# Patient Record
Sex: FEMALE | Race: WHITE | Employment: FULL TIME | URBAN - METROPOLITAN AREA
[De-identification: names, ages, dates, MRNs, and addresses within clinical notes are randomized per-mention and may not be internally consistent; named-entity substitution may affect disease eponyms.]

---

## 2018-01-14 NOTE — RESULT NOTES
Verified Results  * MAMMO SCREENING BILATERAL W CAD [id number] 03:33PM Ru Johnson     Test Name Result Flag Reference   MAMMO SCREENING BILATERAL W CAD (Report)     Patient History:   No known family history of cancer  Patient has never smoked  Patient's BMI is 25 8  Reason for exam: screening (asymptomatic)  Mammo Screening Bilateral W CAD: February 8, 2016 - Check In #:    [de-identified]   Bilateral MLO, CC, and XCCL view(s) were taken  Technologist: AMANDO Huitron (R)(M)   Prior study comparison: January 14, 2013, bilateral OPMA digital    scrn mammo w/CAD performed at [address]  March 31, 2009, bilateral QMA digital scrn mammo   w/CAD, performed at [address]  April 10,    2007, bilateral digital screening [place] [address] [facility] [place]  There are scattered fibroglandular densities  No dominant soft tissue mass, architectural distortion or    suspicious calcifications are noted  The skin and nipple    contours are within normal limits  No evidence of malignancy  No significant changes   when compared with prior studies  ASSESSMENT: BiRad:1 - Negative     Recommendation:   Routine screening mammogram of both breasts in 1 year  A reminder letter will be scheduled  Analyzed by CAD     8-10% of cancers will be missed on mammography  Management of a    palpable abnormality must be based on clinical grounds  Patients   will be notified of their results via letter from our facility  Accredited by [employer] and FDA       Transcription Location: AMANDO Dominguez 98: [id number]     Risk Value(s):   Tyrer-Cuzick 10 Year: 4 258%, Tyrer-Cuzick Lifetime: 11 559%,    Myriad Table: 1 5%, GALEN 5 Year: 1 8%, NCI Lifetime: 10 5%   Signed by:   Antoinette Phillips MD   2/9/16

## 2018-10-25 ENCOUNTER — ANNUAL EXAM (OUTPATIENT)
Dept: FAMILY MEDICINE CLINIC | Facility: CLINIC | Age: 58
End: 2018-10-25
Payer: COMMERCIAL

## 2018-10-25 VITALS
BODY MASS INDEX: 26.37 KG/M2 | RESPIRATION RATE: 16 BRPM | TEMPERATURE: 97.3 F | WEIGHT: 174 LBS | HEART RATE: 106 BPM | SYSTOLIC BLOOD PRESSURE: 108 MMHG | DIASTOLIC BLOOD PRESSURE: 78 MMHG | HEIGHT: 68 IN | OXYGEN SATURATION: 97 %

## 2018-10-25 DIAGNOSIS — Z01.419 ENCOUNTER FOR GYNECOLOGICAL EXAMINATION WITHOUT ABNORMAL FINDING: Primary | ICD-10-CM

## 2018-10-25 DIAGNOSIS — Z13.29 SCREENING FOR THYROID DISORDER: ICD-10-CM

## 2018-10-25 DIAGNOSIS — Z12.4 SCREENING FOR CERVICAL CANCER: ICD-10-CM

## 2018-10-25 DIAGNOSIS — Z12.39 SCREENING FOR BREAST CANCER: ICD-10-CM

## 2018-10-25 DIAGNOSIS — Z13.220 SCREENING, LIPID: ICD-10-CM

## 2018-10-25 DIAGNOSIS — Z23 NEED FOR VACCINATION: ICD-10-CM

## 2018-10-25 DIAGNOSIS — Z13.0 SCREENING, ANEMIA, DEFICIENCY, IRON: ICD-10-CM

## 2018-10-25 DIAGNOSIS — Z13.1 SCREENING FOR DIABETES MELLITUS: ICD-10-CM

## 2018-10-25 PROBLEM — M85.9 LOW BONE DENSITY FOR AGE: Status: ACTIVE | Noted: 2018-10-25

## 2018-10-25 PROCEDURE — 99396 PREV VISIT EST AGE 40-64: CPT | Performed by: FAMILY MEDICINE

## 2018-10-25 PROCEDURE — 99213 OFFICE O/P EST LOW 20 MIN: CPT | Performed by: FAMILY MEDICINE

## 2018-10-25 RX ORDER — LANOLIN ALCOHOL/MO/W.PET/CERES
1 CREAM (GRAM) TOPICAL DAILY
COMMUNITY
End: 2020-04-24 | Stop reason: ALTCHOICE

## 2018-10-25 RX ORDER — IBUPROFEN 200 MG
1 TABLET ORAL AS NEEDED
COMMUNITY
Start: 2015-04-29 | End: 2019-07-03 | Stop reason: ALTCHOICE

## 2018-10-25 NOTE — PROGRESS NOTES
Shona Dodd is a 59 y o   female and is here for routine health maintenance  The patient reports no problems  History of Present Illness     HPI    Well Adult Physical   Patient here for a comprehensive physical exam       Diet and Physical Activity  Diet: well balanced diet  Weight concerns: Patient is overweight (BMI 25 0-29  9)  Exercise: intermittently      Depression Screen  PHQ-9 Depression Screening    PHQ-9:    Frequency of the following problems over the past two weeks:       Little interest or pleasure in doing things:  0 - not at all  Feeling down, depressed, or hopeless:  0 - not at all  PHQ-2 Score:  0          General Health  Hearing: Normal:  bilateral  Vision: goes for regular eye exams, most recent eye exam <1 year and wears glasses  Dental: regular dental visits and brushes teeth twice daily     History:  LMP: No LMP recorded  Patient has had a hysterectomy  Menopause at 34 years  : 2  Para: 2    Cancer Screening  Colononoscopy - UTD, 2018  Mammogram - orderes given today, 2016    Pap today  Abnormal pap? no  Smoker no       The following portions of the patient's history were reviewed and updated as appropriate: allergies, current medications, past family history, past medical history, past social history, past surgical history and problem list     Review of Systems     Review of Systems   Constitutional: Negative for chills, fever and unexpected weight change  HENT: Negative for hearing loss  Eyes: Negative for visual disturbance  Respiratory: Negative for cough and shortness of breath  Cardiovascular: Negative for chest pain  Gastrointestinal: Negative for abdominal pain, constipation and diarrhea  Genitourinary: Negative for difficulty urinating  Musculoskeletal: Negative for arthralgias and gait problem  Skin: Negative for rash  Neurological: Negative for light-headedness and headaches  Psychiatric/Behavioral: Negative for dysphoric mood  Past Medical History     Past Medical History:   Diagnosis Date    No known problems        Past Surgical History     Past Surgical History:   Procedure Laterality Date    HYSTERECTOMY      Resolved 7/2003       Social History     Social History     Social History    Marital status: /Civil Union     Spouse name: N/A    Number of children: N/A    Years of education: N/A     Social History Main Topics    Smoking status: Never Smoker    Smokeless tobacco: Not on file    Alcohol use Yes      Comment: Social    Drug use: No    Sexual activity: Not on file     Other Topics Concern    Not on file     Social History Narrative    No narrative on file       Family History     Family History   Problem Relation Age of Onset    Diabetes Mother         Mellitus    Diabetes Maternal Grandmother         Mellitus       Current Medications       Current Outpatient Prescriptions:     calcium citrate-vitamin D (CITRACAL+D) 315-200 MG-UNIT per tablet, Take 1 tablet by mouth daily, Disp: , Rfl:     ibuprofen (MOTRIN) 200 mg tablet, Take 1 tablet by mouth as needed, Disp: , Rfl:      Allergies     Allergies   Allergen Reactions    Penicillins GI Intolerance     Category: Adverse Reaction;     Pollen Extract        Objective     /78   Pulse (!) 106   Temp (!) 97 3 °F (36 3 °C)   Resp 16   Ht 5' 8 2" (1 732 m)   Wt 78 9 kg (174 lb)   SpO2 97%   BMI 26 30 kg/m²   Wt Readings from Last 3 Encounters:   10/25/18 78 9 kg (174 lb)   08/17/16 78 9 kg (174 lb)   09/30/15 78 7 kg (173 lb 8 oz)     BP Readings from Last 3 Encounters:   10/25/18 108/78   08/17/16 128/84   09/30/15 110/72     Pulse Readings from Last 3 Encounters:   10/25/18 (!) 106   08/17/16 (!) 106   09/30/15 80     Body mass index is 26 3 kg/m²  Physical Exam   Constitutional: She is oriented to person, place, and time  She appears well-developed and well-nourished  HENT:   Head: Normocephalic and atraumatic     Mouth/Throat: Oropharynx is clear and moist    Eyes: Conjunctivae and EOM are normal    Neck: Normal range of motion  Neck supple  No thyroid mass present  Cardiovascular: Normal rate, regular rhythm and normal heart sounds  Pulmonary/Chest: Effort normal and breath sounds normal  No respiratory distress  She has no wheezes  She has no rales  Abdominal: Normal appearance  Musculoskeletal: Normal range of motion  She exhibits no edema  Neurological: She is alert and oriented to person, place, and time  Skin: Skin is warm, dry and intact  Psychiatric: She has a normal mood and affect  Her behavior is normal  Judgment and thought content normal    Nursing note and vitals reviewed  No exam data present    Health Maintenance     Health Maintenance   Topic Date Due    Depression Screening PHQ  1960    PAP SMEAR  01/21/1979    MAMMOGRAM  02/08/2017    INFLUENZA VACCINE  07/01/2018    DTaP,Tdap,and Td Vaccines (2 - Td) 09/30/2025    CRC Screening: Colonoscopy  08/28/2028     Immunization History   Administered Date(s) Administered    Tdap 09/30/2015         Laboratory Results:   No results found for: WBC, HGB, HCT, MCV, PLT  Lab Results   Component Value Date    BUN 17 12/30/2013     Lab Results   Component Value Date    ALT 32 12/30/2013    AST 15 12/30/2013     No results found for: TSH  No components found for: A1C    Lipid Profile:   Lab Results   Component Value Date    CHOL 159 12/30/2013     Lab Results   Component Value Date    HDL 38 12/30/2013     No results found for: [facility]  Lab Results   Component Value Date    LDLCALC 101 12/30/2013     Lab Results   Component Value Date    TRIG 98 12/30/2013       Assessment/Plan         1   Healthy female exam   2  Patient Counseling:   · Nutrition: Stressed importance of a well balanced diet, moderation of sodium/saturated fat, caloric balance and sufficient intake of fiber  · Exercise: Stressed the importance of regular exercise with a goal of 150 minutes per week  · Dental Health: Discussed daily flossing and brushing and regular dental visits     · Immunizations reviewed  She declines a flu vaccine  · Discussed benefits of screening   · Discussed the patient's BMI with her  The BMI is above average; BMI management plan is completed  3  Cancer Screening - mammo ordered, pap today, colo utd  4  Labs ordered  5  Follow up next physical in 1 year      Orlando Vinson MD

## 2018-10-25 NOTE — PATIENT INSTRUCTIONS
Pap Smear   AMBULATORY CARE:   A Pap smear,  or Pap test, is used to screen for cervical cancer  It is also used to find precancerous and cancerous cells of the vulva and vagina  Prepare for a Pap smear: The best time to schedule the test is right after your period stops  Do not have a Pap smear during your monthly period  During a Pap smear:   · You will lie on your back and place your feet on footrests called stirrups  Your healthcare provider will gently insert a device called a speculum into your vagina  The speculum is used to open the walls of your vagina so he can see your cervix  · Your healthcare provider will gently scrape your cervix and vaginal areas for cell samples  The samples are placed in a container with liquid or on a glass slide  They are sent to a lab and examined for abnormal cells  A test for Human Papillomavirus (HPV) may be done at the same time  HPV is a sexually transmitted virus that can cause changes in cervical cells  After your Pap smear:  Your healthcare provider will tell you when you can expect your Pap smear results  You may have some spotting the day of your procedure  When to get a Pap smear:  Pap smears are usually done every 3 to 5 years depending on your age  You may need a Pap smear more often if you have any of the following:  · Positive test result for the human papillomavirus (HPV)    · A history of cervical cancer    · HIV    · A weak immune system    · Exposure to diethylstilbestrol (KOMAL) medicine when your mother was pregnant with you  © 2017 [address] Information is for End User's use only and may not be sold, redistributed or otherwise used for commercial purposes  All illustrations and images included in CareNotes® are the copyrighted property of [employer]  or Ollie Barrera  The above information is an  only  It is not intended as medical advice for individual conditions or treatments   Talk to your doctor, nurse or pharmacist before following any medical regimen to see if it is safe and effective for you  Wellness Visit for Adults   AMBULATORY CARE:   A wellness visit  is when you see your healthcare provider to get screened for health problems  You can also get advice on how to stay healthy  Write down your questions so you remember to ask them  Ask your healthcare provider how often you should have a wellness visit  What happens at a wellness visit:  Your healthcare provider will ask about your health, and your family history of health problems  This includes high blood pressure, heart disease, and cancer  He or she will ask if you have symptoms that concern you, if you smoke, and about your mood  You may also be asked about your intake of medicines, supplements, food, and alcohol  Any of the following may be done:  · Your weight  will be checked  Your height may also be checked so your body mass index (BMI) can be calculated  Your BMI shows if you are at a healthy weight  · Your blood pressure  and heart rate will be checked  Your temperature may also be checked  · Blood and urine tests  may be done  Blood tests may be done to check your cholesterol levels  Abnormal cholesterol levels increase your risk for heart disease and stroke  You may also need a blood or urine test to check for diabetes if you are at increased risk  Urine tests may be done to look for signs of an infection or kidney disease  · A physical exam  includes checking your heartbeat and lungs with a stethoscope  Your healthcare provider may also check your skin to look for sun damage  · Screening tests  may be recommended  A screening test is done to check for diseases that may not cause symptoms  The screening tests you may need depend on your age, gender, family history, and lifestyle habits  For example, colorectal screening may be recommended if you are 48years old or older    Screening tests you need if you are a woman: · A Pap smear  is used to screen for cervical cancer  Pap smears are usually done every 3 to 5 years depending on your age  You may need them more often if you have had abnormal Pap smear test results in the past  Ask your healthcare provider how often you should have a Pap smear  · A mammogram  is an x-ray of your breasts to screen for breast cancer  Experts recommend mammograms every 2 years starting at age 48 years  You may need a mammogram at age 52 years or younger if you have an increased risk for breast cancer  Talk to your healthcare provider about when you should start having mammograms and how often you need them  Vaccines you may need:   · Get an influenza vaccine  every year  The influenza vaccine protects you from the flu  Several types of viruses cause the flu  The viruses change over time, so new vaccines are made each year  · Get a tetanus-diphtheria (Td) booster vaccine  every 10 years  This vaccine protects you against tetanus and diphtheria  Tetanus is a severe infection that may cause painful muscle spasms and lockjaw  Diphtheria is a severe bacterial infection that causes a thick covering in the back of your mouth and throat  · Get a human papillomavirus (HPV) vaccine  if you are female and aged 23 to 32 or male 23 to 24 and never received it  This vaccine protects you from HPV infection  HPV is the most common infection spread by sexual contact  HPV may also cause vaginal, penile, and anal cancers  · Get a pneumococcal vaccine  if you are aged 72 years or older  The pneumococcal vaccine is an injection given to protect you from pneumococcal disease  Pneumococcal disease is an infection caused by pneumococcal bacteria  The infection may cause pneumonia, meningitis, or an ear infection  · Get a shingles vaccine  if you are aged 61 or older, even if you have had shingles before  The shingles vaccine is an injection to protect you from the varicella-zoster virus   This is the same virus that causes chickenpox  Shingles is a painful rash that develops in people who had chickenpox or have been exposed to the virus  How to eat healthy:  My Plate is a model for planning healthy meals  It shows the types and amounts of foods that should go on your plate  Fruits and vegetables make up about half of your plate, and grains and protein make up the other half  A serving of dairy is included on the side of your plate  The amount of calories and serving sizes you need depends on your age, gender, weight, and height  Examples of healthy foods are listed below:  · Eat a variety of vegetables  such as dark green, red, and orange vegetables  You can also include canned vegetables low in sodium (salt) and frozen vegetables without added butter or sauces  · Eat a variety of fresh fruits , canned fruit in 100% juice, frozen fruit, and dried fruit  · Include whole grains  At least half of the grains you eat should be whole grains  Examples include whole-wheat bread, wheat pasta, brown rice, and whole-grain cereals such as oatmeal     · Eat a variety of protein foods such as seafood (fish and shellfish), lean meat, and poultry without skin (turkey and chicken)  Examples of lean meats include pork leg, shoulder, or tenderloin, and beef round, sirloin, tenderloin, and extra lean ground beef  Other protein foods include eggs and egg substitutes, beans, peas, soy products, nuts, and seeds  · Choose low-fat dairy products such as skim or 1% milk or low-fat yogurt, cheese, and cottage cheese  · Limit unhealthy fats  such as butter, hard margarine, and shortening  Exercise:  Exercise at least 30 minutes per day on most days of the week  Some examples of exercise include walking, biking, dancing, and swimming  You can also fit in more physical activity by taking the stairs instead of the elevator or parking farther away from stores  Include muscle strengthening activities 2 days each week  Regular exercise provides many health benefits  It helps you manage your weight, and decreases your risk for type 2 diabetes, heart disease, stroke, and high blood pressure  Exercise can also help improve your mood  Ask your healthcare provider about the best exercise plan for you  General health and safety guidelines:   · Do not smoke  Nicotine and other chemicals in cigarettes and cigars can cause lung damage  Ask your healthcare provider for information if you currently smoke and need help to quit  E-cigarettes or smokeless tobacco still contain nicotine  Talk to your healthcare provider before you use these products  · Limit alcohol  A drink of alcohol is 12 ounces of beer, 5 ounces of wine, or 1½ ounces of liquor  · Lose weight, if needed  Being overweight increases your risk of certain health conditions  These include heart disease, high blood pressure, type 2 diabetes, and certain types of cancer  · Protect your skin  Do not sunbathe or use tanning beds  Use sunscreen with a SPF 15 or higher  Apply sunscreen at least 15 minutes before you go outside  Reapply sunscreen every 2 hours  Wear protective clothing, hats, and sunglasses when you are outside  · Drive safely  Always wear your seatbelt  Make sure everyone in your car wears a seatbelt  A seatbelt can save your life if you are in an accident  Do not use your cell phone when you are driving  This could distract you and cause an accident  Pull over if you need to make a call or send a text message  · Practice safe sex  Use latex condoms if are sexually active and have more than one partner  Your healthcare provider may recommend screening tests for sexually transmitted infections (STIs)  · Wear helmets, lifejackets, and protective gear  Always wear a helmet when you ride a bike or motorcycle, go skiing, or play sports that could cause a head injury  Wear protective equipment when you play sports   Wear a lifejacket when you are on a boat or doing water sports  © 2017 [facility] Information is for End User's use only and may not be sold, redistributed or otherwise used for commercial purposes  All illustrations and images included in CareNotes® are the copyrighted property of A [employer] M [employer]  or Ollie Barrera  The above information is an  only  It is not intended as medical advice for individual conditions or treatments  Talk to your doctor, nurse or pharmacist before following any medical regimen to see if it is safe and effective for you

## 2018-10-25 NOTE — PROGRESS NOTES
Subjective    Saulo Coffey is a 59 y o  female here for a routine gynecologic exam       Current complaints:   none     Gynecologic History  Post-menopausal - partial hysterectomy in her 45s  Last Pap: does not know, very long time ago   Results were: normal  Last mammogram: 16  Results were: normal  Last colonoscopy: 18  Results were: normal    Obstetric History  OB History         - 2 SVDs  No complications    The following portions of the patient's history were reviewed and updated as appropriate: allergies, current medications, past family history, past medical history, past social history, past surgical history and problem list     Review of Systems   Constitutional: Negative for unexpected weight change  Genitourinary: Negative for difficulty urinating, dyspareunia, dysuria, frequency, menstrual problem, pelvic pain, vaginal bleeding, vaginal discharge and vaginal pain  Skin: Negative for rash  Objective    Vitals:    10/25/18 1545   BP: 108/78   Pulse: (!) 106   Resp: 16   Temp: (!) 97 3 °F (36 3 °C)   SpO2: 97%   Weight: 78 9 kg (174 lb)   Height: 5' 8 2" (1 732 m)       Physical Exam   Constitutional: She appears well-developed and well-nourished  Pulmonary/Chest: Effort normal  Right breast exhibits no mass, no nipple discharge, no skin change and no tenderness  Left breast exhibits no mass, no nipple discharge, no skin change and no tenderness  Breasts are symmetrical  There is no breast swelling  Genitourinary: Vagina normal and uterus normal  No breast discharge or bleeding  Pelvic exam was performed with patient supine  There is no rash, tenderness or lesion on the right labia  There is no rash, tenderness or lesion on the left labia  Cervix exhibits no motion tenderness, no discharge and no friability  Right adnexum displays no mass, no tenderness and no fullness  Left adnexum displays no mass, no tenderness and no fullness         Assessment     Healthy female exam  Plan     Education reviewed: low fat, low cholesterol diet and self breast exams  Mammogram ordered    Labs ordered

## 2018-11-01 LAB
CLINICAL INFO: NORMAL
DATE PREVIOUS BX: NORMAL
LMP START DATE: NORMAL
QUESTION/PROBLEM: NORMAL
SL AMB CONTAINER TYPE: NORMAL
SL AMB FINAL RESOLUTION: NORMAL
SL AMB PREV. PAP:: NORMAL
SL AMB REPORT STATUS: NORMAL
SPECIMEN SOURCE CVX/VAG CYTO: NORMAL
STAT OF ADQ CVX/VAG CYTO-IMP: NORMAL

## 2019-07-03 ENCOUNTER — OFFICE VISIT (OUTPATIENT)
Dept: FAMILY MEDICINE CLINIC | Facility: CLINIC | Age: 59
End: 2019-07-03
Payer: COMMERCIAL

## 2019-07-03 VITALS
HEART RATE: 96 BPM | SYSTOLIC BLOOD PRESSURE: 108 MMHG | BODY MASS INDEX: 26.26 KG/M2 | WEIGHT: 173.25 LBS | TEMPERATURE: 98.4 F | DIASTOLIC BLOOD PRESSURE: 70 MMHG | HEIGHT: 68 IN | OXYGEN SATURATION: 98 %

## 2019-07-03 DIAGNOSIS — Z12.39 SCREENING FOR BREAST CANCER: ICD-10-CM

## 2019-07-03 DIAGNOSIS — H00.014 HORDEOLUM EXTERNUM OF LEFT UPPER EYELID: Primary | ICD-10-CM

## 2019-07-03 PROCEDURE — 99213 OFFICE O/P EST LOW 20 MIN: CPT | Performed by: FAMILY MEDICINE

## 2019-07-03 PROCEDURE — 1036F TOBACCO NON-USER: CPT | Performed by: FAMILY MEDICINE

## 2019-07-03 PROCEDURE — 3008F BODY MASS INDEX DOCD: CPT | Performed by: FAMILY MEDICINE

## 2019-07-03 RX ORDER — ERYTHROMYCIN 5 MG/G
0.5 OINTMENT OPHTHALMIC 2 TIMES DAILY
Qty: 3.5 G | Refills: 0 | Status: SHIPPED | OUTPATIENT
Start: 2019-07-03 | End: 2020-04-24 | Stop reason: ALTCHOICE

## 2019-07-03 NOTE — PROGRESS NOTES
Subjective:   Chief Complaint   Patient presents with    Eye Problem     pt c/o swollen left eye lid that she noticed last thursday        Patient ID: Antonieta Blood is a 62 y o  female  The patient is here because she has a swelling on the left upper eyelid  It has been there about six days  It is generally uncomfortable though not terribly painful  Nothing has drained from the area  She has no trouble with her vision  She has no redness of the eye itself  She has never had anything like this before, she thinks it might be a stye      The following portions of the patient's history were reviewed and updated as appropriate: allergies, current medications, past family history, past medical history, past social history, past surgical history and problem list     Review of Systems      Objective:  Vitals:    07/03/19 1436   BP: 108/70   Pulse: 96   Temp: 98 4 °F (36 9 °C)   SpO2: 98%   Weight: 78 6 kg (173 lb 4 oz)   Height: 5' 8 2" (1 732 m)      Physical Exam   Constitutional: She is oriented to person, place, and time  She appears well-developed and well-nourished  Eyes: Left eye exhibits hordeolum  Neurological: She is alert and oriented to person, place, and time  Skin: Skin is warm and dry  Diabetic Foot Exam      Assessment/Plan:    No problem-specific Assessment & Plan notes found for this encounter  Diagnoses and all orders for this visit:    Hordeolum externum of left upper eyelid  -     erythromycin (ILOTYCIN) ophthalmic ointment; Administer 0 5 inches into the left eye 2 (two) times a day    BMI 26 0-26 9,adult    Screening for breast cancer  -     Mammo screening bilateral w 3d & cad; Future        BMI Counseling: Body mass index is 26 19 kg/m²  Discussed the patient's BMI with her  The BMI is above average  BMI counseling and education was provided to the patient  Exercise recommendations include exercising 3-5 times per week

## 2019-07-03 NOTE — PATIENT INSTRUCTIONS
Stye   WHAT YOU NEED TO KNOW:   A stye is a lump on the edge or inside of your eyelid caused by an infection  A stye can form on your upper or lower eyelid  It usually goes away in 2 to 4 days  DISCHARGE INSTRUCTIONS:   Medicines:   · Antibiotic medicine: This is given as an ointment to put into your eye  It is used to fight an infection caused by bacteria  Use as directed  · Take your medicine as directed  Contact your healthcare provider if you think your medicine is not helping or if you have side effects  Tell him of her if you are allergic to any medicine  Keep a list of the medicines, vitamins, and herbs you take  Include the amounts, and when and why you take them  Bring the list or the pill bottles to follow-up visits  Carry your medicine list with you in case of an emergency  Follow up with your healthcare provider as directed:  Write down your questions so you remember to ask them during your visits  Self-care:   · Use warm compresses: This will help decrease swelling and pain  Wet a clean washcloth with warm water and place it on your eye for 10 to 15 minutes, 3 to 4 times each day or as directed  · Keep your hands away from your eye: This helps to prevent the spread of the infection to other parts of the eye  Wash your hands often with soap and dry with a clean towel  Do not squeeze the stye  · Do not use eye makeup:  Do not wear eye makeup while you have a stye  Eye makeup may carry bacteria and cause another stye  Throw away eye makeup and brushes used to apply the makeup  Use new eye makeup after the stye has gone away  Do not share eye makeup with others  · Prevent another stye:  Wash your face and clean your eyelashes every day  Remove eye makeup with makeup remover  This helps to completely remove eye makeup without heavy rubbing  Contact your healthcare provider if:   · You have redness and discharge around your eye, and your eye pain is getting worse      · Your vision changes  · The stye has not gone away within 7 days  · The stye comes back within a short period of time after treatment  · You have questions or concerns about your condition or care  © 2017 [address] Information is for End User's use only and may not be sold, redistributed or otherwise used for commercial purposes  All illustrations and images included in CareNotes® are the copyrighted property of [employer]  or Ollie Barrera  The above information is an  only  It is not intended as medical advice for individual conditions or treatments  Talk to your doctor, nurse or pharmacist before following any medical regimen to see if it is safe and effective for you

## 2019-07-11 ENCOUNTER — TELEPHONE (OUTPATIENT)
Dept: FAMILY MEDICINE CLINIC | Facility: CLINIC | Age: 59
End: 2019-07-11

## 2019-07-11 NOTE — TELEPHONE ENCOUNTER
She can keep using the medicine until it goes away    If it is not gone within two weeks I think she should see an ophthalmologist

## 2019-07-11 NOTE — TELEPHONE ENCOUNTER
Pt was in to see you last week for a sty in the eye  It is getting better  But not fully better  How long should she keep putting on the medication      [id number]

## 2019-09-16 ENCOUNTER — HOSPITAL ENCOUNTER (OUTPATIENT)
Dept: BONE DENSITY | Facility: IMAGING CENTER | Age: 59
Discharge: HOME | End: 2019-09-16
Payer: COMMERCIAL

## 2019-09-16 VITALS — BODY MASS INDEX: 25.92 KG/M2 | WEIGHT: 175 LBS | HEIGHT: 69 IN

## 2019-09-16 DIAGNOSIS — Z12.39 SCREENING FOR BREAST CANCER: ICD-10-CM

## 2019-09-16 PROCEDURE — 77067 SCR MAMMO BI INCL CAD: CPT

## 2019-09-16 PROCEDURE — 77063 BREAST TOMOSYNTHESIS BI: CPT

## 2019-11-16 ENCOUNTER — IMMUNIZATIONS (OUTPATIENT)
Dept: FAMILY MEDICINE CLINIC | Facility: CLINIC | Age: 59
End: 2019-11-16
Payer: COMMERCIAL

## 2019-11-16 DIAGNOSIS — Z23 ENCOUNTER FOR IMMUNIZATION: ICD-10-CM

## 2019-11-16 PROCEDURE — 90471 IMMUNIZATION ADMIN: CPT

## 2019-11-16 PROCEDURE — 90682 RIV4 VACC RECOMBINANT DNA IM: CPT

## 2020-04-24 ENCOUNTER — OFFICE VISIT (OUTPATIENT)
Dept: FAMILY MEDICINE CLINIC | Facility: CLINIC | Age: 60
End: 2020-04-24
Payer: COMMERCIAL

## 2020-04-24 VITALS
HEART RATE: 108 BPM | TEMPERATURE: 97.4 F | WEIGHT: 175 LBS | OXYGEN SATURATION: 98 % | BODY MASS INDEX: 25.92 KG/M2 | HEIGHT: 69 IN
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
## 2020-04-24 PROCEDURE — 99213 OFFICE O/P EST LOW 20 MIN: CPT | Performed by: FAMILY MEDICINE

## 2020-04-24 PROCEDURE — 3008F BODY MASS INDEX DOCD: CPT | Performed by: FAMILY MEDICINE

## 2020-04-24 PROCEDURE — 1036F TOBACCO NON-USER: CPT | Performed by: FAMILY MEDICINE

## 2021-03-26 DIAGNOSIS — Z23 ENCOUNTER FOR IMMUNIZATION: ICD-10-CM

## 2021-07-27 ENCOUNTER — OFFICE VISIT (OUTPATIENT)
Dept: FAMILY MEDICINE CLINIC | Facility: CLINIC | Age: 61
End: 2021-07-27
Payer: COMMERCIAL

## 2021-07-27 VITALS
DIASTOLIC BLOOD PRESSURE: 72 MMHG | OXYGEN SATURATION: 97 % | SYSTOLIC BLOOD PRESSURE: 110 MMHG | HEIGHT: 68 IN | WEIGHT: 174 LBS | HEART RATE: 91 BPM | BODY MASS INDEX: 26.37 KG/M2

## 2021-07-27 DIAGNOSIS — Z13.1 SCREENING FOR DIABETES MELLITUS: ICD-10-CM

## 2021-07-27 DIAGNOSIS — Z12.31 ENCOUNTER FOR SCREENING MAMMOGRAM FOR MALIGNANT NEOPLASM OF BREAST: ICD-10-CM

## 2021-07-27 DIAGNOSIS — Z13.228 SCREENING FOR ENDOCRINE, NUTRITIONAL, METABOLIC AND IMMUNITY DISORDER: ICD-10-CM

## 2021-07-27 DIAGNOSIS — Z13.220 SCREENING, LIPID: ICD-10-CM

## 2021-07-27 DIAGNOSIS — Z13.0 SCREENING FOR ENDOCRINE, NUTRITIONAL, METABOLIC AND IMMUNITY DISORDER: ICD-10-CM

## 2021-07-27 DIAGNOSIS — L02.91 ABSCESS: Primary | ICD-10-CM

## 2021-07-27 DIAGNOSIS — Z11.59 NEED FOR HEPATITIS C SCREENING TEST: ICD-10-CM

## 2021-07-27 DIAGNOSIS — Z13.21 SCREENING FOR ENDOCRINE, NUTRITIONAL, METABOLIC AND IMMUNITY DISORDER: ICD-10-CM

## 2021-07-27 DIAGNOSIS — Z13.29 SCREENING FOR ENDOCRINE, NUTRITIONAL, METABOLIC AND IMMUNITY DISORDER: ICD-10-CM

## 2021-07-27 DIAGNOSIS — Z13.0 SCREENING, ANEMIA, DEFICIENCY, IRON: ICD-10-CM

## 2021-07-27 PROCEDURE — 99213 OFFICE O/P EST LOW 20 MIN: CPT | Performed by: FAMILY MEDICINE

## 2021-07-27 PROCEDURE — 1036F TOBACCO NON-USER: CPT | Performed by: FAMILY MEDICINE

## 2021-07-27 PROCEDURE — 3008F BODY MASS INDEX DOCD: CPT | Performed by: FAMILY MEDICINE

## 2021-07-27 PROCEDURE — 3725F SCREEN DEPRESSION PERFORMED: CPT | Performed by: FAMILY MEDICINE

## 2021-07-27 RX ORDER — SULFAMETHOXAZOLE AND TRIMETHOPRIM 800; 160 MG/1; MG/1
1 TABLET ORAL EVERY 12 HOURS SCHEDULED
Qty: 14 TABLET | Refills: 0 | Status: SHIPPED | OUTPATIENT
Start: 2021-07-27 | End: 2021-08-03

## 2021-07-27 NOTE — PROGRESS NOTES
Subjective:   Chief Complaint   Patient presents with    Abscess     PT COMES IN WITH ABSCESS UNDER LEFT BREAST   PT DOES USE WARM COMPRESS AND IS ABLE TO EXPELL WHITE DISCHARGE  fbw and mammo orders given to patient  Patient ID: Bridgett Falcon is a 59 y o  female  Patient is here today because she has an abscess on the left breast   She was in [place] on vacation, did a lot of walking, was very hot, there is no air conditioning  Her bra was rubbing against this area  It started as a small pimple  Now it has gotten large, tender, and is draining white pus   No fevers or chills      The following portions of the patient's history were reviewed and updated as appropriate: allergies, current medications, past family history, past medical history, past social history, past surgical history and problem list     Review of Systems          Objective:  Vitals:    07/27/21 1457   BP: 110/72   Pulse: 91   SpO2: 97%   Weight: 78 9 kg (174 lb)   Height: 5' 8" (1 727 m)      Physical Exam  Constitutional:       General: She is not in acute distress  Appearance: Normal appearance  She is not ill-appearing  Skin:     General: Skin is warm and dry  Comments: Larger erythematous fluctuant pustule on the lower medial aspect of the left breast, surrounding erythema, tender, draining pus   Neurological:      General: No focal deficit present  Mental Status: She is alert and oriented to person, place, and time  Cranial Nerves: No cranial nerve deficit  Gait: Gait normal    Psychiatric:         Mood and Affect: Mood normal          Behavior: Behavior normal          Thought Content: Thought content normal          Judgment: Judgment normal            Assessment/Plan:    No problem-specific Assessment & Plan notes found for this encounter  I advised the patient stand and hot shower, use warm compresses, trying get this to drain even more  I am adding an antibiotic    If this is not going away within a couple of days we may need to open it  Diagnoses and all orders for this visit:    Abscess  -     sulfamethoxazole-trimethoprim (BACTRIM DS) 800-160 mg per tablet; Take 1 tablet by mouth every 12 (twelve) hours for 7 days    Encounter for screening mammogram for malignant neoplasm of breast  -     Mammo screening bilateral w 3d & cad; Future    Need for hepatitis C screening test  -     Hepatitis C RNA, quantitative, PCR; Future  -     Hepatitis C RNA, quantitative, PCR    Screening for diabetes mellitus  -     Comprehensive metabolic panel; Future  -     Comprehensive metabolic panel    Screening for endocrine, nutritional, metabolic and immunity disorder  -     CBC and differential; Future  -     Comprehensive metabolic panel; Future  -     TSH, 3rd generation with Free T4 reflex; Future  -     CBC and differential  -     Comprehensive metabolic panel  -     TSH, 3rd generation with Free T4 reflex    Screening, anemia, deficiency, iron  -     CBC and differential; Future  -     Comprehensive metabolic panel; Future  -     CBC and differential  -     Comprehensive metabolic panel    Screening, lipid  -     Lipid panel;  Future  -     Lipid panel

## 2021-07-29 ENCOUNTER — TELEPHONE (OUTPATIENT)
Dept: FAMILY MEDICINE CLINIC | Facility: CLINIC | Age: 61
End: 2021-07-29

## 2021-07-29 DIAGNOSIS — L02.91 ABSCESS: Primary | ICD-10-CM

## 2021-07-29 RX ORDER — CEFUROXIME AXETIL 500 MG/1
500 TABLET ORAL EVERY 12 HOURS SCHEDULED
Qty: 20 TABLET | Refills: 0 | Status: SHIPPED | OUTPATIENT
Start: 2021-07-29 | End: 2021-08-08

## 2021-07-29 NOTE — TELEPHONE ENCOUNTER
Pharmacy called stating pt said she is allergic to sulfa and is requesting a different abx to pharmacy

## 2021-08-19 ENCOUNTER — LAB (OUTPATIENT)
Dept: LAB | Facility: HOSPITAL | Age: 61
End: 2021-08-19
Attending: FAMILY MEDICINE
Payer: COMMERCIAL

## 2021-08-19 DIAGNOSIS — Z00.00 ROUTINE GENERAL MEDICAL EXAMINATION AT A HEALTH CARE FACILITY: ICD-10-CM

## 2021-08-19 LAB
ALBUMIN SERPL BCP-MCNC: 3.8 G/DL (ref 3.5–5)
ALP SERPL-CCNC: 104 U/L (ref 46–116)
ALT SERPL W P-5'-P-CCNC: 27 U/L (ref 12–78)
ANION GAP SERPL CALCULATED.3IONS-SCNC: 2 MMOL/L (ref 4–13)
AST SERPL W P-5'-P-CCNC: 17 U/L (ref 5–45)
BASOPHILS # BLD AUTO: 0.03 THOUSANDS/ΜL (ref 0–0.1)
BASOPHILS NFR BLD AUTO: 1 % (ref 0–1)
BILIRUB SERPL-MCNC: 1.13 MG/DL (ref 0.2–1)
BUN SERPL-MCNC: 16 MG/DL (ref 5–25)
CALCIUM SERPL-MCNC: 9 MG/DL (ref 8.3–10.1)
CHLORIDE SERPL-SCNC: 107 MMOL/L (ref 100–108)
CHOLEST SERPL-MCNC: 183 MG/DL (ref 50–200)
CO2 SERPL-SCNC: 30 MMOL/L (ref 21–32)
CREAT SERPL-MCNC: 0.65 MG/DL (ref 0.6–1.3)
EOSINOPHIL # BLD AUTO: 0.07 THOUSAND/ΜL (ref 0–0.61)
EOSINOPHIL NFR BLD AUTO: 2 % (ref 0–6)
ERYTHROCYTE [DISTWIDTH] IN BLOOD BY AUTOMATED COUNT: 12 % (ref 11.6–15.1)
GFR SERPL CREATININE-BSD FRML MDRD: 95 ML/MIN/1.73SQ M
GLUCOSE P FAST SERPL-MCNC: 85 MG/DL (ref 65–99)
HCT VFR BLD AUTO: 45.4 % (ref 34.8–46.1)
HDLC SERPL-MCNC: 45 MG/DL
HGB BLD-MCNC: 15.1 G/DL (ref 11.5–15.4)
IMM GRANULOCYTES # BLD AUTO: 0.01 THOUSAND/UL (ref 0–0.2)
IMM GRANULOCYTES NFR BLD AUTO: 0 % (ref 0–2)
LDLC SERPL CALC-MCNC: 122 MG/DL (ref 0–100)
LYMPHOCYTES # BLD AUTO: 0.94 THOUSANDS/ΜL (ref 0.6–4.47)
LYMPHOCYTES NFR BLD AUTO: 24 % (ref 14–44)
MCH RBC QN AUTO: 30.1 PG (ref 26.8–34.3)
MCHC RBC AUTO-ENTMCNC: 33.3 G/DL (ref 31.4–37.4)
MCV RBC AUTO: 90 FL (ref 82–98)
MONOCYTES # BLD AUTO: 0.26 THOUSAND/ΜL (ref 0.17–1.22)
MONOCYTES NFR BLD AUTO: 7 % (ref 4–12)
NEUTROPHILS # BLD AUTO: 2.56 THOUSANDS/ΜL (ref 1.85–7.62)
NEUTS SEG NFR BLD AUTO: 66 % (ref 43–75)
NONHDLC SERPL-MCNC: 138 MG/DL
NRBC BLD AUTO-RTO: 0 /100 WBCS
PLATELET # BLD AUTO: 215 THOUSANDS/UL (ref 149–390)
PMV BLD AUTO: 11.3 FL (ref 8.9–12.7)
POTASSIUM SERPL-SCNC: 3.9 MMOL/L (ref 3.5–5.3)
PROT SERPL-MCNC: 7.2 G/DL (ref 6.4–8.2)
RBC # BLD AUTO: 5.02 MILLION/UL (ref 3.81–5.12)
SODIUM SERPL-SCNC: 139 MMOL/L (ref 136–145)
TRIGL SERPL-MCNC: 80 MG/DL
TSH SERPL DL<=0.05 MIU/L-ACNC: 1.34 UIU/ML (ref 0.36–3.74)
WBC # BLD AUTO: 3.87 THOUSAND/UL (ref 4.31–10.16)

## 2021-08-19 PROCEDURE — 80061 LIPID PANEL: CPT

## 2021-08-19 PROCEDURE — 87522 HEPATITIS C REVRS TRNSCRPJ: CPT

## 2021-08-19 PROCEDURE — 85025 COMPLETE CBC W/AUTO DIFF WBC: CPT

## 2021-08-19 PROCEDURE — 36415 COLL VENOUS BLD VENIPUNCTURE: CPT

## 2021-08-19 PROCEDURE — 84443 ASSAY THYROID STIM HORMONE: CPT

## 2021-08-19 PROCEDURE — 80053 COMPREHEN METABOLIC PANEL: CPT

## 2021-08-21 LAB
HCV RNA SERPL NAA+PROBE-ACNC: NORMAL IU/ML
TEST INFORMATION: NORMAL

## 2021-09-27 ENCOUNTER — HOSPITAL ENCOUNTER (OUTPATIENT)
Dept: MAMMOGRAPHY | Facility: IMAGING CENTER | Age: 61
Discharge: HOME | End: 2021-09-27
Payer: COMMERCIAL

## 2021-09-27 VITALS — BODY MASS INDEX: 26.37 KG/M2 | WEIGHT: 174 LBS | HEIGHT: 68 IN

## 2021-09-27 DIAGNOSIS — Z12.31 ENCOUNTER FOR SCREENING MAMMOGRAM FOR MALIGNANT NEOPLASM OF BREAST: ICD-10-CM

## 2021-09-27 PROCEDURE — 77063 BREAST TOMOSYNTHESIS BI: CPT

## 2021-09-27 PROCEDURE — 77067 SCR MAMMO BI INCL CAD: CPT

## 2021-09-30 ENCOUNTER — TELEPHONE (OUTPATIENT)
Dept: FAMILY MEDICINE CLINIC | Facility: CLINIC | Age: 61
End: 2021-09-30

## 2021-10-06 ENCOUNTER — OFFICE VISIT (OUTPATIENT)
Dept: FAMILY MEDICINE CLINIC | Facility: CLINIC | Age: 61
End: 2021-10-06
Payer: COMMERCIAL

## 2021-10-06 VITALS
HEIGHT: 68 IN | BODY MASS INDEX: 26.37 KG/M2 | DIASTOLIC BLOOD PRESSURE: 81 MMHG | HEART RATE: 97 BPM | OXYGEN SATURATION: 97 % | WEIGHT: 174 LBS | TEMPERATURE: 97.4 F | SYSTOLIC BLOOD PRESSURE: 137 MMHG

## 2021-10-06 DIAGNOSIS — Z00.00 ANNUAL PHYSICAL EXAM: Primary | ICD-10-CM

## 2021-10-06 DIAGNOSIS — Z23 NEED FOR VACCINATION: ICD-10-CM

## 2021-10-06 PROCEDURE — 90686 IIV4 VACC NO PRSV 0.5 ML IM: CPT

## 2021-10-06 PROCEDURE — 3725F SCREEN DEPRESSION PERFORMED: CPT | Performed by: NURSE PRACTITIONER

## 2021-10-06 PROCEDURE — 90471 IMMUNIZATION ADMIN: CPT

## 2021-10-06 PROCEDURE — 3008F BODY MASS INDEX DOCD: CPT | Performed by: NURSE PRACTITIONER

## 2021-10-06 PROCEDURE — 99396 PREV VISIT EST AGE 40-64: CPT | Performed by: NURSE PRACTITIONER

## 2021-10-06 PROCEDURE — 1036F TOBACCO NON-USER: CPT | Performed by: NURSE PRACTITIONER

## 2021-10-18 ENCOUNTER — HOSPITAL ENCOUNTER (OUTPATIENT)
Dept: ULTRASOUND IMAGING | Facility: CLINIC | Age: 61
Discharge: HOME | End: 2021-10-18
Payer: COMMERCIAL

## 2021-10-18 DIAGNOSIS — R92.8 ABNORMAL SCREENING MAMMOGRAM: ICD-10-CM

## 2021-10-18 PROCEDURE — 76642 ULTRASOUND BREAST LIMITED: CPT

## 2021-11-01 ENCOUNTER — TELEPHONE (OUTPATIENT)
Dept: FAMILY MEDICINE CLINIC | Facility: CLINIC | Age: 61
End: 2021-11-01

## 2021-11-04 ENCOUNTER — OFFICE VISIT (OUTPATIENT)
Dept: FAMILY MEDICINE CLINIC | Facility: CLINIC | Age: 61
End: 2021-11-04
Payer: COMMERCIAL

## 2021-11-04 VITALS
BODY MASS INDEX: 26.14 KG/M2 | DIASTOLIC BLOOD PRESSURE: 80 MMHG | TEMPERATURE: 97 F | HEIGHT: 68 IN | WEIGHT: 172.5 LBS | HEART RATE: 90 BPM | OXYGEN SATURATION: 99 % | SYSTOLIC BLOOD PRESSURE: 110 MMHG

## 2021-11-04 DIAGNOSIS — F41.9 ANXIETY: ICD-10-CM

## 2021-11-04 DIAGNOSIS — I99.8 FLUCTUATING BLOOD PRESSURE: ICD-10-CM

## 2021-11-04 DIAGNOSIS — D72.819 LEUKOPENIA, UNSPECIFIED TYPE: Primary | ICD-10-CM

## 2021-11-04 PROCEDURE — 1036F TOBACCO NON-USER: CPT | Performed by: PHYSICIAN ASSISTANT

## 2021-11-04 PROCEDURE — 3008F BODY MASS INDEX DOCD: CPT | Performed by: PHYSICIAN ASSISTANT

## 2021-11-04 PROCEDURE — 99214 OFFICE O/P EST MOD 30 MIN: CPT | Performed by: PHYSICIAN ASSISTANT

## 2021-11-18 LAB
BASOPHILS # BLD AUTO: 28 CELLS/UL (ref 0–200)
BASOPHILS NFR BLD AUTO: 0.7 %
EOSINOPHIL # BLD AUTO: 68 CELLS/UL (ref 15–500)
EOSINOPHIL NFR BLD AUTO: 1.7 %
ERYTHROCYTE [DISTWIDTH] IN BLOOD BY AUTOMATED COUNT: 12.2 % (ref 11–15)
HCT VFR BLD AUTO: 43.6 % (ref 35–45)
HGB BLD-MCNC: 14.7 G/DL (ref 11.7–15.5)
LYMPHOCYTES # BLD AUTO: 992 CELLS/UL (ref 850–3900)
LYMPHOCYTES NFR BLD AUTO: 24.8 %
MCH RBC QN AUTO: 29.9 PG (ref 27–33)
MCHC RBC AUTO-ENTMCNC: 33.7 G/DL (ref 32–36)
MCV RBC AUTO: 88.6 FL (ref 80–100)
MONOCYTES # BLD AUTO: 316 CELLS/UL (ref 200–950)
MONOCYTES NFR BLD AUTO: 7.9 %
NEUTROPHILS # BLD AUTO: 2596 CELLS/UL (ref 1500–7800)
NEUTROPHILS NFR BLD AUTO: 64.9 %
PLATELET # BLD AUTO: 228 THOUSAND/UL (ref 140–400)
PMV BLD REES-ECKER: 10.7 FL (ref 7.5–12.5)
RBC # BLD AUTO: 4.92 MILLION/UL (ref 3.8–5.1)
WBC # BLD AUTO: 4 THOUSAND/UL (ref 3.8–10.8)

## 2021-12-14 ENCOUNTER — OFFICE VISIT (OUTPATIENT)
Dept: FAMILY MEDICINE CLINIC | Facility: CLINIC | Age: 61
End: 2021-12-14
Payer: COMMERCIAL

## 2021-12-14 VITALS
OXYGEN SATURATION: 97 % | TEMPERATURE: 97.1 F | BODY MASS INDEX: 26.14 KG/M2 | WEIGHT: 172.5 LBS | HEART RATE: 100 BPM | HEIGHT: 68 IN | DIASTOLIC BLOOD PRESSURE: 66 MMHG | SYSTOLIC BLOOD PRESSURE: 126 MMHG

## 2021-12-14 DIAGNOSIS — Z01.30 BP CHECK: Primary | ICD-10-CM

## 2021-12-14 PROCEDURE — 1036F TOBACCO NON-USER: CPT | Performed by: NURSE PRACTITIONER

## 2021-12-14 PROCEDURE — 3008F BODY MASS INDEX DOCD: CPT | Performed by: NURSE PRACTITIONER

## 2021-12-14 PROCEDURE — 99213 OFFICE O/P EST LOW 20 MIN: CPT | Performed by: NURSE PRACTITIONER

## 2022-02-23 ENCOUNTER — OFFICE VISIT (OUTPATIENT)
Dept: FAMILY MEDICINE CLINIC | Facility: CLINIC | Age: 62
End: 2022-02-23
Payer: COMMERCIAL

## 2022-02-23 VITALS
SYSTOLIC BLOOD PRESSURE: 120 MMHG | HEIGHT: 68 IN | BODY MASS INDEX: 26.67 KG/M2 | HEART RATE: 114 BPM | TEMPERATURE: 97.2 F | WEIGHT: 176 LBS | DIASTOLIC BLOOD PRESSURE: 68 MMHG | OXYGEN SATURATION: 97 %

## 2022-02-23 DIAGNOSIS — M54.41 CHRONIC RIGHT-SIDED LOW BACK PAIN WITH RIGHT-SIDED SCIATICA: ICD-10-CM

## 2022-02-23 DIAGNOSIS — J30.9 ALLERGIC RHINITIS, UNSPECIFIED SEASONALITY, UNSPECIFIED TRIGGER: Primary | ICD-10-CM

## 2022-02-23 DIAGNOSIS — G89.29 CHRONIC RIGHT-SIDED LOW BACK PAIN WITH RIGHT-SIDED SCIATICA: ICD-10-CM

## 2022-02-23 PROCEDURE — 99214 OFFICE O/P EST MOD 30 MIN: CPT | Performed by: PHYSICIAN ASSISTANT

## 2022-02-23 PROCEDURE — 1036F TOBACCO NON-USER: CPT | Performed by: PHYSICIAN ASSISTANT

## 2022-02-23 PROCEDURE — 3008F BODY MASS INDEX DOCD: CPT | Performed by: PHYSICIAN ASSISTANT

## 2022-02-23 RX ORDER — FLUTICASONE PROPIONATE 50 MCG
2 SPRAY, SUSPENSION (ML) NASAL DAILY
Qty: 9.9 ML | Refills: 3 | Status: SHIPPED | OUTPATIENT
Start: 2022-02-23

## 2022-02-23 NOTE — PROGRESS NOTES
Assessment/Plan:           Problem List Items Addressed This Visit     None      Visit Diagnoses     Allergic rhinitis, unspecified seasonality, unspecified trigger    -  Primary    Relevant Medications    fluticasone (FLONASE) 50 mcg/act nasal spray    Chronic right-sided low back pain with right-sided sciatica        Relevant Orders    Ambulatory Referral to Physical Therapy            Subjective:      Patient ID: Nereida Vinson is a 57 y o  female  C/o ear discomfort past few months  Does note muffled sounds in the morning, and has some congestion  Having hot flashes, past 14 years, in the morning, and sometimes in the evening, and afternoon  Having some right sided lower back pain  Review of Systems   Constitutional: Positive for diaphoresis  Negative for chills, fatigue and fever  HENT: Positive for congestion and ear pain  Negative for ear discharge, hearing loss, rhinorrhea, sore throat and tinnitus  Respiratory: Negative for cough, chest tightness and shortness of breath  Cardiovascular: Negative for chest pain, palpitations and leg swelling  Objective:      /68   Pulse (!) 114   Temp (!) 97 2 °F (36 2 °C) (Tympanic)   Ht 5' 8" (1 727 m)   Wt 79 8 kg (176 lb)   SpO2 97%   BMI 26 76 kg/m²          Physical Exam  Vitals reviewed  Constitutional:       General: She is not in acute distress  Appearance: Normal appearance  She is not ill-appearing, toxic-appearing or diaphoretic  HENT:      Head: Normocephalic and atraumatic  Right Ear: Tympanic membrane, ear canal and external ear normal  There is no impacted cerumen  Left Ear: Tympanic membrane, ear canal and external ear normal  There is no impacted cerumen  Ears:      Comments: Congestion noted in middle ears  Nose: Congestion present  Comments: Turbinates inflamed  Mouth/Throat:      Mouth: Mucous membranes are dry        Pharynx: No oropharyngeal exudate or posterior oropharyngeal erythema  Eyes:      General: No scleral icterus  Right eye: No discharge  Left eye: No discharge  Extraocular Movements: Extraocular movements intact  Conjunctiva/sclera: Conjunctivae normal    Pulmonary:      Effort: Pulmonary effort is normal  No respiratory distress  Breath sounds: Normal breath sounds  No stridor  No wheezing or rhonchi  Musculoskeletal:         General: No swelling, tenderness, deformity or signs of injury  Normal range of motion  Cervical back: Neck supple  Right lower leg: No edema  Left lower leg: No edema  Neurological:      General: No focal deficit present  Mental Status: She is alert and oriented to person, place, and time

## 2022-03-07 ENCOUNTER — EVALUATION (OUTPATIENT)
Dept: PHYSICAL THERAPY | Facility: CLINIC | Age: 62
End: 2022-03-07
Payer: COMMERCIAL

## 2022-03-07 DIAGNOSIS — G89.29 CHRONIC RIGHT-SIDED LOW BACK PAIN WITH RIGHT-SIDED SCIATICA: Primary | ICD-10-CM

## 2022-03-07 DIAGNOSIS — M54.41 CHRONIC RIGHT-SIDED LOW BACK PAIN WITH RIGHT-SIDED SCIATICA: Primary | ICD-10-CM

## 2022-03-07 PROCEDURE — 97110 THERAPEUTIC EXERCISES: CPT | Performed by: PHYSICAL THERAPIST

## 2022-03-07 PROCEDURE — 97140 MANUAL THERAPY 1/> REGIONS: CPT | Performed by: PHYSICAL THERAPIST

## 2022-03-07 PROCEDURE — 97162 PT EVAL MOD COMPLEX 30 MIN: CPT | Performed by: PHYSICAL THERAPIST

## 2022-03-07 NOTE — PROGRESS NOTES
PT Evaluation     Today's date: 3/7/2022  Patient name: Anthony Bella  : 1960  MRN: [id number]  Referring provider: Harlan Jones PA-C  Dx:   Encounter Diagnosis     ICD-10-CM    1  Chronic right-sided low back pain with right-sided sciatica  M54 41     G89 29             Assessment  Assessment details: Anthony Bella is a 57 y o  female who presents with pain, decreased strength, decreased ROM, decreased joint mobility, joint effusion, ambulatory dysfunction and postural  dysfunction  Due to these impairments, patient has difficulty performing ADL's, recreational activities, work-related activities, engaging in social activities, ambulation, stair negotiation, lifting/carrying, transfers  Patient's clinical presentation is consistent with their referring diagnosis of Chronic right-sided low back pain with right-sided sciatica  (primary encounter diagnosis)  Patient has been educated in home exercise program and plan of care  Patient would benefit from skilled physical therapy services to address their aforementioned functional limitations and progress towards prior level of function and independence with home exercise program      Impairments: abnormal gait, abnormal or restricted ROM, activity intolerance, impaired balance, impaired physical strength, lacks appropriate home exercise program, pain with function, poor posture  and poor body mechanics  Functional limitations: prolonged sit, stand/walk, bending, lifting/carrying    Prognosis: good  Prognosis details: + factors: high motivation levels,   - factors: none    Goals  Short term goals to be accomplished in 4 weeks:  STG 1: Pt will demo independence with postural management and HEP    STG 2: Pt will demo L/S AROM < or = min loss throughout to promote improved functional mobility and body mechanics  STG 3: Pt will reports pain dec freq and intensity 50%    Long term goals to be accomplished in 12 weeks:  LTG 1: Pt will demo good body mech with >75% functional challenges to prevent reinjury  LTG 2: Pt will be able to return to sit for 2 hours pain free as per PLOF  LTG3: Pt will have no pain for 1 week with any functional activities  Plan  Plan details: HEP development, stretching, strengthening, A/AA/PROM, joint mobilizations, posture education, STM/MI as needed to reduce muscle tension, muscle reeducation, PLOC discussed and agreed upon with patient  Patient would benefit from: PT eval and skilled physical therapy  Planned modality interventions: cryotherapy, thermotherapy: hydrocollator packs and unattended electrical stimulation  Planned therapy interventions: manual therapy, neuromuscular re-education, self care, therapeutic activities, therapeutic exercise, home exercise program, balance, joint mobilization, postural training, patient education, strengthening, stretching and flexibility  Frequency: 2x week  Duration in weeks: 12  Plan of Care beginning date: 3/7/2022  Plan of Care expiration date: 2022  Treatment plan discussed with: patient        Subjective Evaluation    History of Present Illness  Mechanism of injury: Pt is a 58 y/o female who presents to PT with R sided lumbar pain with sciatica that began about 1 month ago  Went to her PCP on 22 where she was prescribed PT to  Address her lumbar pain  Pain at worst with prolonged sitting     Pain  Current pain ratin  At best pain ratin  At worst pain ratin  Location: R lumbar/sciatica  Quality: throbbing, radiating, pulling, discomfort and tight  Aggravating factors: stair climbing, walking, standing, sitting and lifting    Treatments  Current treatment: physical therapy  Patient Goals  Patient goals for therapy: increased motion, decreased pain, increased strength, independence with ADLs/IADLs and return to sport/leisure activities  Patient goal: prolonged sit, stand/walk, bending, lifting/carrying         Objective     Concurrent Complaints  Negative for night pain, disturbed sleep, bladder dysfunction, bowel dysfunction and saddle (S4) numbness    Postural Observations  Seated posture: fair  Standing posture: fair  Correction of posture: has no consistent effect        Tenderness     Additional Tenderness Details  TTP over lumbar and thoracic paraspinals, and R piriformis     Active Range of Motion   Cervical/Thoracic Spine       Thoracic    Extension:  Restriction level: moderate    Lumbar   Extension:  Restriction level: moderate  Left lateral flexion:  Restriction level: moderate  Right lateral flexion:  Restriction level: moderate  Left rotation:  Restriction level: moderate  Right rotation:  Restriction level: moderate    Joint Play     Hypomobile: T8, T9, T10, T11, T12, L1, L2, L3, L4, L5 and S1     Strength/Myotome Testing     Left Hip   Planes of Motion   Flexion: 4+  Abduction: 3-    Right Hip   Planes of Motion   Flexion: 4+  Abduction: 3-    Left Knee   Flexion: 4  Extension: 4    Right Knee   Flexion: 4  Extension: 4    Left Ankle/Foot   Dorsiflexion: 4    Right Ankle/Foot   Dorsiflexion: 4    Muscle Activation     Additional Muscle Activation Details        Tests     Lumbar     Left   Negative quadrant  Right   Negative quadrant  Right Hip   Positive KISHA          Precautions:   Lipoma of torso   Low bone density for age         Manuals 3/7            STM lumbar/thoracic             PA mobs lumbar thoracic                                       Neuro Re-Ed 3/7            Pressure cuff abd brace                                       Row             Sh ext                                       Ther Ex 3/7                         S/l thoracic rotation 10x                         ANTONY 10x            Seated thoracic extension 10x            Supine piriformis stretch :20x2 ea            S/l clamshell 10x ea            S/l hip abd 10x ea                         Ther Activity 3/7 Modalities 3/7                         CT

## 2022-03-09 ENCOUNTER — OFFICE VISIT (OUTPATIENT)
Dept: PHYSICAL THERAPY | Facility: CLINIC | Age: 62
End: 2022-03-09
Payer: COMMERCIAL

## 2022-03-09 DIAGNOSIS — M54.41 CHRONIC RIGHT-SIDED LOW BACK PAIN WITH RIGHT-SIDED SCIATICA: Primary | ICD-10-CM

## 2022-03-09 DIAGNOSIS — G89.29 CHRONIC RIGHT-SIDED LOW BACK PAIN WITH RIGHT-SIDED SCIATICA: Primary | ICD-10-CM

## 2022-03-09 PROCEDURE — 97112 NEUROMUSCULAR REEDUCATION: CPT | Performed by: PHYSICAL THERAPIST

## 2022-03-09 PROCEDURE — 97110 THERAPEUTIC EXERCISES: CPT | Performed by: PHYSICAL THERAPIST

## 2022-03-09 PROCEDURE — 97140 MANUAL THERAPY 1/> REGIONS: CPT | Performed by: PHYSICAL THERAPIST

## 2022-03-09 NOTE — PROGRESS NOTES
Daily Note     Today's date: 3/9/2022  Patient name: Jonas Crystal  : 1960  MRN: [id number]  Referring provider: Georgina Monreal PA-C  Dx:   Encounter Diagnosis     ICD-10-CM    1  Chronic right-sided low back pain with right-sided sciatica  M54 41     G89 29           Subjective: Pt noted that she has been consistently performing her HEP without an increase in pain  Objective: See treatment diary below    Assessment: Tolerated treatment well  Patient demonstrated fatigue post treatment, exhibited good technique with therapeutic exercises and would benefit from continued PT   Plan: Continue per plan of care        Precautions:   Lipoma of torso   Low bone density for age         Manuals 3/7 3/9           STM lumbar/thoracic             PA mobs lumbar thoracic             STM R piriformis   JZ           Piriformis, ITB, HS stretch  JZ                        Neuro Re-Ed 3/7 3/9           Pressure cuff abd brace  :30x3           Pressure cuff march >120  10x ea           Pressure cuff lvl 2 march >120  2x ea                        Row             Sh ext                                       Ther Ex 3/7 3/9                        S/l thoracic rotation 10x                         ANTONY 10x            Seated thoracic extension 10x            Supine piriformis stretch :20x2 ea            S/l clamshell 10x ea O 3x10            S/l hip abd 10x ea            Bridge w/ TB abd  O 3x10           TB Side step  O 10' 3x ea                                     Ther Activity 3/7 3/9                                                                            Modalities 3/7 3/9                        CT

## 2022-03-14 ENCOUNTER — OFFICE VISIT (OUTPATIENT)
Dept: PHYSICAL THERAPY | Facility: CLINIC | Age: 62
End: 2022-03-14
Payer: COMMERCIAL

## 2022-03-14 DIAGNOSIS — M54.41 CHRONIC RIGHT-SIDED LOW BACK PAIN WITH RIGHT-SIDED SCIATICA: Primary | ICD-10-CM

## 2022-03-14 DIAGNOSIS — G89.29 CHRONIC RIGHT-SIDED LOW BACK PAIN WITH RIGHT-SIDED SCIATICA: Primary | ICD-10-CM

## 2022-03-14 PROCEDURE — 97140 MANUAL THERAPY 1/> REGIONS: CPT

## 2022-03-14 PROCEDURE — 97112 NEUROMUSCULAR REEDUCATION: CPT

## 2022-03-14 PROCEDURE — 97110 THERAPEUTIC EXERCISES: CPT

## 2022-03-14 NOTE — PROGRESS NOTES
Daily Note     Today's date: 3/14/2022  Patient name: Daniela Salcedo  : 1960  MRN: [id number]  Referring provider: Ángel Lea PA-C  Dx:   Encounter Diagnosis     ICD-10-CM    1  Chronic right-sided low back pain with right-sided sciatica  M54 41     G89 29           Subjective: Aysha Estes reports she was sore following last PT session, sx's present for 1-2 days following  Pt states majority of soreness was present along back and inside of thighs  Pt states she did not complete much exercises d/t soreness  Objective: See treatment diary below    Assessment: Reba tolerated PT treatment well  Initiated session with RB warm up  RLE stretching performed, limited adductor flexibility observed  Pt was give self stretch with HEP to address  Pt was more challenged with TA engagement this session  Lower pressure required on BPC to maintain isometric contraction  Fatigue evident with glute strengthening  Pt would benefit from continued PT to further address impairments and maximize functional level  Plan: Continue per plan of care        Precautions:   Lipoma of torso   Low bone density for age         Manuals 3/7 3/9 3/14          STM lumbar/thoracic             PA mobs lumbar thoracic             STM R piriformis   JZ           Piriformis, ITB, HS stretch  JZ                        Neuro Re-Ed 3/7 3/9 3/14          Pressure cuff abd brace  :30x3 :30x3           Pressure cuff march >120  10x ea >90 10x ea           Pressure cuff lvl 2 march >120  2x ea                        Row             Sh ext                                       Ther Ex 3/7 3/9 3/14                       S/l thoracic rotation 10x                         ANTONY 10x            Seated thoracic extension 10x            Supine piriformis stretch :20x2 ea            S/l clamshell 10x ea O 3x10  O 3x10          S/l hip abd 10x ea            Bridge w/ TB abd  O 3x10 O 3x10          TB Side step  O 10' 3x ea           Hamstring stretch    :30x3 Adductor stretch    :30x3          Ther Activity 3/7 3/9 3/14          RB   5'                                                              Modalities 3/7 3/9 3/14                       CT

## 2022-03-16 ENCOUNTER — OFFICE VISIT (OUTPATIENT)
Dept: PHYSICAL THERAPY | Facility: CLINIC | Age: 62
End: 2022-03-16
Payer: COMMERCIAL

## 2022-03-16 DIAGNOSIS — M54.41 CHRONIC RIGHT-SIDED LOW BACK PAIN WITH RIGHT-SIDED SCIATICA: Primary | ICD-10-CM

## 2022-03-16 DIAGNOSIS — G89.29 CHRONIC RIGHT-SIDED LOW BACK PAIN WITH RIGHT-SIDED SCIATICA: Primary | ICD-10-CM

## 2022-03-16 PROCEDURE — 97110 THERAPEUTIC EXERCISES: CPT

## 2022-03-16 PROCEDURE — 97140 MANUAL THERAPY 1/> REGIONS: CPT

## 2022-03-16 PROCEDURE — 97112 NEUROMUSCULAR REEDUCATION: CPT

## 2022-03-16 NOTE — PROGRESS NOTES
Daily Note     Today's date: 3/16/2022  Patient name: Nereida Vinson  : 1960  MRN: [id number]  Referring provider: Issa Brasher PA-C  Dx:   Encounter Diagnosis     ICD-10-CM    1  Chronic right-sided low back pain with right-sided sciatica  M54 41     G89 29           Subjective: Lucas Cheatham reports she felt good after last PT session, minimal to no low back pain occurring following day  She states today she had some radicular sx's in R thigh, but did not complete HEP  Objective: See treatment diary below    Assessment: Reba tolerated PT treatment well  Continued with current exercise program  RB performed as warm up  Greater flexibility displayed with adductor and hamstring stretch today  Pt remains very challenged with core stabilization  She is able to maintain isometric hold around  on BP cuff, unable to advance at this time  Cues required to avoid valsalva with exercises  Increased TB resistance with glute strengthening  Pt would benefit from continued PT to further address impairments and maximize functional level  Plan: Continue per plan of care        Precautions:   Lipoma of torso   Low bone density for age         Manuals 3/7 3/9 3/14 3/16         STM lumbar/thoracic             PA mobs lumbar thoracic             STM R piriformis   JZ JW JW         Piriformis, ITB, HS stretch  JJERROD RAMOS                      Neuro Re-Ed 3/7 3/9 3/14 3/16         Pressure cuff abd brace  :30x3 :30x3  :30x3          Pressure cuff march >120  10x ea >90 10x ea  >100 2x10 ea         Pressure cuff lvl 2 march >120  2x ea                        Row             Sh ext                                       Ther Ex 3/7 3/9 3/14 3/16                      S/l thoracic rotation 10x                         ANTONY 10x            Seated thoracic extension 10x            Supine piriformis stretch :20x2 ea            S/l clamshell 10x ea O 3x10  O 3x10 O 3x10         S/l hip abd 10x ea            Bridge w/ TB abd  O 3x10 O 3x10 O 3x10         TB Side step  O 10' 3x ea  O 3x10         Hamstring stretch    :30x3 :30x3         Adductor stretch    :30x3 :30x3         Ther Activity 3/7 3/9 3/14 3/16         RB   5' 5'                                                             Modalities 3/7 3/9 3/14 3/16                      CT

## 2022-03-21 ENCOUNTER — OFFICE VISIT (OUTPATIENT)
Dept: PHYSICAL THERAPY | Facility: CLINIC | Age: 62
End: 2022-03-21
Payer: COMMERCIAL

## 2022-03-21 DIAGNOSIS — M54.41 CHRONIC RIGHT-SIDED LOW BACK PAIN WITH RIGHT-SIDED SCIATICA: Primary | ICD-10-CM

## 2022-03-21 DIAGNOSIS — G89.29 CHRONIC RIGHT-SIDED LOW BACK PAIN WITH RIGHT-SIDED SCIATICA: Primary | ICD-10-CM

## 2022-03-21 PROCEDURE — 97110 THERAPEUTIC EXERCISES: CPT

## 2022-03-21 PROCEDURE — 97112 NEUROMUSCULAR REEDUCATION: CPT

## 2022-03-21 PROCEDURE — 97140 MANUAL THERAPY 1/> REGIONS: CPT

## 2022-03-21 NOTE — PROGRESS NOTES
Daily Note     Today's date: 3/21/2022  Patient name: Niall Geren  : 1960  MRN: [id number]  Referring provider: Aliya Wilkerson PA-C  Dx:   Encounter Diagnosis     ICD-10-CM    1  Chronic right-sided low back pain with right-sided sciatica  M54 41     G89 29           Subjective: Elizabeth Diego reports she is still experiencing sharp spasms into RLE, primarily into buttock region with nerve pain along inside of thigh  She feels PT is helping, but has not seen significant changes yet  Objective: See treatment diary below    Assessment: Elizabeth Diego is progressing nicely with current POC  Advanced glute strengthening with addition of TB hip 3 way  She remains very challenged with TA activation with BPC feed back  Able to add level 2 march today, pt fatigued after five repetitions  TPR performed to piriformis, TTP present  Pt would benefit from continued PT to further address impairments and maximize functional level  Plan: Continue per plan of care        Precautions:   Lipoma of torso   Low bone density for age         Manuals 3/7 3/9 3/14 3/16 3/21        STM lumbar/thoracic             PA mobs lumbar thoracic             STM R piriformis   JZ JW JW JW        Piriformis, ITB, HS stretch  Vicki Cortez                      Neuro Re-Ed 3/7 3/9 3/14 3/16 3/21        Pressure cuff abd brace  :30x3 :30x3  :30x3  :30x3  >100        Pressure cuff march >120  10x ea >90 10x ea  >100 2x10 ea >100 2x10 ea        Pressure cuff lvl 2 march >120  2x ea   5x ea                      Row             Sh ext                                       Ther Ex 3/7 3/9 3/14 3/16 3/21                     S/l thoracic rotation 10x                         ANTONY 10x            Seated thoracic extension 10x            Supine piriformis stretch :20x2 ea            S/l clamshell 10x ea O 3x10  O 3x10 O 3x10         S/l hip abd 10x ea    Hip 3 way G 3x10         Bridge w/ TB abd  O 3x10 O 3x10 O 3x10         TB Side step  O 10' 3x ea  O 3x10 G 3x10 Hamstring stretch    :30x3 :30x3 :30x3        Adductor stretch    :30x3 :30x3 :30x3        Ther Activity 3/7 3/9 3/14 3/16 3/21        RB   5' 5' 5'                                                            Modalities 3/7 3/9 3/14 3/16 3/21                     CT

## 2022-03-23 ENCOUNTER — OFFICE VISIT (OUTPATIENT)
Dept: PHYSICAL THERAPY | Facility: CLINIC | Age: 62
End: 2022-03-23
Payer: COMMERCIAL

## 2022-03-23 DIAGNOSIS — G89.29 CHRONIC RIGHT-SIDED LOW BACK PAIN WITH RIGHT-SIDED SCIATICA: Primary | ICD-10-CM

## 2022-03-23 DIAGNOSIS — M54.41 CHRONIC RIGHT-SIDED LOW BACK PAIN WITH RIGHT-SIDED SCIATICA: Primary | ICD-10-CM

## 2022-03-23 PROCEDURE — 97112 NEUROMUSCULAR REEDUCATION: CPT

## 2022-03-23 PROCEDURE — 97110 THERAPEUTIC EXERCISES: CPT

## 2022-03-23 NOTE — PROGRESS NOTES
Daily Note     Today's date: 3/21/2022  Patient name: Agatha Marcum  : 1960  MRN: [id number]  Referring provider: Makenna Morales PA-C  Dx:   Encounter Diagnosis     ICD-10-CM    1  Chronic right-sided low back pain with right-sided sciatica  M54 41     G89 29           Subjective: Paula Man reports spasms in R hamstring are not occurring as frequent, but when they do occur they are intense  She notes minimal soreness following PT sessions  Objective: See treatment diary below    Assessment: Reba tolerated PT treatment well  Advance core stabilization today with addition of pallof press, pallof rotation, and bird dogs  She required tactile cueing for TA engagement with marching, improved following cues  She displays improved hamstring flexibility  Pt would benefit from continued PT to further address impairments and maximize functional level  Plan: Continue per plan of care        Precautions:   Lipoma of torso   Low bone density for age         Manuals 3/7 3/9 3/14 3/16 3/21 3/23       STM lumbar/thoracic             PA mobs lumbar thoracic             STM R piriformis   JZ JW JW JW        Piriformis, ITB, HS stretch  Shant RAMOS                     Neuro Re-Ed 3/7 3/9 3/14 3/16 3/21 3/23       Pressure cuff abd brace  :30x3 :30x3  :30x3  :30x3  >100 :30x3 >110        Pressure cuff march >120  10x ea >90 10x ea  >100 2x10 ea >100 2x10 ea >100 2x10 ea       Pressure cuff lvl 2 march >120  2x ea   5x ea  x10 ea       Bird dog       2x10 ea        Row             Sh ext             Pallof press      15/ 3x10 b/l        Pallof rot      12/ 3x10 b/l        Ther Ex 3/7 3/9 3/14 3/16 3/21 3/23                    S/l thoracic rotation 10x                         ANTONY 10x            Seated thoracic extension 10x            Supine piriformis stretch :20x2 ea            S/l clamshell 10x ea O 3x10  O 3x10 O 3x10         S/l hip abd 10x ea    Hip 3 way G 3x10         Bridge w/ TB abd  O 3x10 O 3x10 O 3x10 TB Side step  O 10' 3x ea  O 3x10 G 3x10        Hamstring stretch    :30x3 :30x3 :30x3 :30x3        Adductor stretch    :30x3 :30x3 :30x3 :30x3       Ther Activity 3/7 3/9 3/14 3/16 3/21 3/23       RB   5' 5' 5' 5' lvl 3                                                           Modalities 3/7 3/9 3/14 3/16 3/21 3/23       [address]             CT

## 2022-03-28 ENCOUNTER — OFFICE VISIT (OUTPATIENT)
Dept: PHYSICAL THERAPY | Facility: CLINIC | Age: 62
End: 2022-03-28
Payer: COMMERCIAL

## 2022-03-28 DIAGNOSIS — G89.29 CHRONIC RIGHT-SIDED LOW BACK PAIN WITH RIGHT-SIDED SCIATICA: Primary | ICD-10-CM

## 2022-03-28 DIAGNOSIS — M54.41 CHRONIC RIGHT-SIDED LOW BACK PAIN WITH RIGHT-SIDED SCIATICA: Primary | ICD-10-CM

## 2022-03-28 PROCEDURE — 97112 NEUROMUSCULAR REEDUCATION: CPT

## 2022-03-28 PROCEDURE — 97110 THERAPEUTIC EXERCISES: CPT

## 2022-03-28 PROCEDURE — 97140 MANUAL THERAPY 1/> REGIONS: CPT

## 2022-03-28 NOTE — PROGRESS NOTES
Daily Note     Today's date: 3/21/2022  Patient name: Meghan Seo  : 1960  MRN: [id number]  Referring provider: Winston Miles PA-C  Dx:   Encounter Diagnosis     ICD-10-CM    1  Chronic right-sided low back pain with right-sided sciatica  M54 41     G89 29           Subjective: William Murcia offers no new complaints upon arrival  She continues to feel fatigued and soreness following PT sessions, sx's only lasting for a day  Objective: See treatment diary below    Assessment: Reba tolerated PT treatment well  Continued with current exercise program emphasizing core stabilization  Advanced BP cuff exercises with higher pressure goal, pt challenged  She was able to complete marching with greater control and core engagement this session  TPR performed to piriformis and glut med, continues to present with TTP  Fatigue evident post session  Pt would benefit from continued PT to further address impairments and maximize functional level  Plan: Continue per plan of care        Precautions:   Lipoma of torso   Low bone density for age         Manuals 3/7 3/9 3/14 3/16 3/21 3/23 3/28      STM lumbar/thoracic             PA mobs lumbar thoracic             STM R piriformis   JZ JW JW JW  JW      Piriformis, ITB, HS stretch  Davian RAMOS                   Neuro Re-Ed 3/7 3/9 3/14 3/16 3/21 3/23 3/28      Pressure cuff abd brace  :30x3 :30x3  :30x3  :30x3  >100 :30x3 >110  :30x3  >120        Pressure cuff march >120  10x ea >90 10x ea  >100 2x10 ea >100 2x10 ea >100 2x10 ea >100 2x10 ea      Pressure cuff lvl 2 march >120  2x ea   5x ea  x10 ea x10 ea       Bird dog       2x10 ea  2x10 ea       Row             Sh ext             Pallof press      15/ 3x10 b/l  18/ 3x10      Pallof rot      12/ 3x10 b/l  12/ 3x10 b/l       Ther Ex 3/7 3/9 3/14 3/16 3/21 3/23 3/28                   S/l thoracic rotation 10x                         ANTONY 10x            Seated thoracic extension 10x            Supine piriformis stretch :20x2 ea            S/l clamshell 10x ea O 3x10  O 3x10 O 3x10         S/l hip abd 10x ea    Hip 3 way G 3x10         Bridge w/ TB abd  O 3x10 O 3x10 O 3x10         TB Side step  O 10' 3x ea  O 3x10 G 3x10        Hamstring stretch    :30x3 :30x3 :30x3 :30x3  :30x3      Adductor stretch    :30x3 :30x3 :30x3 :30x3 :30x3       Ther Activity 3/7 3/9 3/14 3/16 3/21 3/23 3/28      RB   5' 5' 5' 5' lvl 3 5' lvl 3                                                          Modalities 3/7 3/9 3/14 3/16 3/21 3/23 3/28                   CT

## 2022-03-30 ENCOUNTER — OFFICE VISIT (OUTPATIENT)
Dept: PHYSICAL THERAPY | Facility: CLINIC | Age: 62
End: 2022-03-30
Payer: COMMERCIAL

## 2022-03-30 DIAGNOSIS — M54.41 CHRONIC RIGHT-SIDED LOW BACK PAIN WITH RIGHT-SIDED SCIATICA: Primary | ICD-10-CM

## 2022-03-30 DIAGNOSIS — G89.29 CHRONIC RIGHT-SIDED LOW BACK PAIN WITH RIGHT-SIDED SCIATICA: Primary | ICD-10-CM

## 2022-03-30 PROCEDURE — 97110 THERAPEUTIC EXERCISES: CPT

## 2022-03-30 PROCEDURE — 97112 NEUROMUSCULAR REEDUCATION: CPT

## 2022-03-30 NOTE — PROGRESS NOTES
Daily Note     Today's date: 3/21/2022  Patient name: Ole Elizabeth  : 1960  MRN: [id number]  Referring provider: Jacey Oswald PA-C  Dx:   Encounter Diagnosis     ICD-10-CM    1  Chronic right-sided low back pain with right-sided sciatica  M54 41     G89 29           Subjective: Robby Protestant reports she continues to experience "intense" jolts of pain and tightness in RLE throughout the day, occurring less frequent but still pretty painful  Objective: See treatment diary below    Assessment: Reba tolerated PT treatment well  Initiated session with RB warm up  Performed lumbar and thoracic mobility exercises with addition of DB to achieve greater ROM  L/S and T/S extension ROM limited at this time  Pt continues to fatigue fairly quickly with glute strengthening, advanced TB resistance today  Added periscapular strengthening to program, pt performed well  Fatigue evident post session  Pt would benefit from continued PT to further address impairments and maximize functional level  Plan: Continue per plan of care        Precautions:   Lipoma of torso   Low bone density for age         Manuals 3/7 3/9 3/14 3/16 3/21 3/23 3/28 3/30     STM lumbar/thoracic             PA mobs lumbar thoracic             STM R piriformis   JZ JW JW JW  JW      Piriformis, ITB, HS stretch  Arvind Teofilo [address]                   Neuro Re-Ed 3/7 3/9 3/14 3/16 3/21 3/23 3/28 3/30     Pressure cuff abd brace  :30x3 :30x3  :30x3  :30x3  >100 :30x3 >110  :30x3  >120        Pressure cuff march >120  10x ea >90 10x ea  >100 2x10 ea >100 2x10 ea >100 2x10 ea >100 2x10 ea      Pressure cuff lvl 2 march >120  2x ea   5x ea  x10 ea x10 ea       Bird dog       2x10 ea  2x10 ea       Row        20/ 3x10      Sh ext        16/ 3x10      Pallof press      15/ 3x10 b/l  18/ 3x10      Pallof rot      12/ 3x10 b/l  12/ 3x10 b/l       Ther Ex 3/7 3/9 3/14 3/16 3/21 3/23 3/28 3/30     S/l thoracic rotation 10x       10x      ANTONY 10x       3x10 Seated thoracic extension 10x       3x10 4#     Supine piriformis stretch :20x2 ea       :30x2 ea      S/l clamshell 10x ea O 3x10  O 3x10 O 3x10    G 3x10      S/l hip abd 10x ea    Hip 3 way G 3x10         Bridge w/ TB abd  O 3x10 O 3x10 O 3x10    G 3x10      TB Side step  O 10' 3x ea  O 3x10 G 3x10        Hamstring stretch    :30x3 :30x3 :30x3 :30x3  :30x3 :30x2     Adductor stretch    :30x3 :30x3 :30x3 :30x3 :30x3  :30x2     Ther Activity 3/7 3/9 3/14 3/16 3/21 3/23 3/28 5/30     RB   5' 5' 5' 5' lvl 3 5' lvl 3 5' lvl 3                                                         Modalities 3/7 3/9 3/14 3/16 3/21 3/23 3/28 5/30                  CT

## 2022-04-06 ENCOUNTER — OFFICE VISIT (OUTPATIENT)
Dept: PHYSICAL THERAPY | Facility: CLINIC | Age: 62
End: 2022-04-06
Payer: COMMERCIAL

## 2022-04-06 DIAGNOSIS — G89.29 CHRONIC RIGHT-SIDED LOW BACK PAIN WITH RIGHT-SIDED SCIATICA: Primary | ICD-10-CM

## 2022-04-06 DIAGNOSIS — M54.41 CHRONIC RIGHT-SIDED LOW BACK PAIN WITH RIGHT-SIDED SCIATICA: Primary | ICD-10-CM

## 2022-04-06 PROCEDURE — 97112 NEUROMUSCULAR REEDUCATION: CPT

## 2022-04-06 PROCEDURE — 97140 MANUAL THERAPY 1/> REGIONS: CPT

## 2022-04-06 PROCEDURE — 97110 THERAPEUTIC EXERCISES: CPT

## 2022-04-06 NOTE — PROGRESS NOTES
Daily Note     Today's date: 2022  Patient name: Lilian Head  : 1960  MRN: [id number]  Referring provider: Bakari Hector PA-C  Dx:   Encounter Diagnosis     ICD-10-CM    1  Chronic right-sided low back pain with right-sided sciatica  M54 41     G89 29                   Subjective: Pt reports she continues to experience radicular pain in RLE, that tends to be worse at night time  Does feel like frequency of symptoms have been improving  Notes having slight pain in R mid thigh prior to start of treatment today  Objective: See treatment diary below      Assessment: Tolerated treatment well  Continued with program as outlined below, focusing on core strength and stability  Most challenged today with pallof rot and shldr ext today  Did present with slight TTP along R piriformis, but no jolts of pain with pressure to this area  Responds well to manual stretching and STM noting slight decrease in symptoms post treatment; "I always feel better after my stretches "  Patient would benefit from continued PT  Plan: Continue per plan of care        Precautions:   Lipoma of torso   Low bone density for age         Manuals 3/7 3/9 3/14 3/16 3/21 3/23 3/28 3/30 4/6    STM lumbar/thoracic             PA mobs lumbar thoracic             STM R piriformis   JZ JW JW JW  JW  AFB    Piriformis, ITB, HS stretch  Sherrill RAMOS  AFB                 Neuro Re-Ed 3/7 3/9 3/14 3/16 3/21 3/23 3/28 3/30 4/6    Pressure cuff abd brace  :30x3 :30x3  :30x3  :30x3  >100 :30x3 >110  :30x3  >120        Pressure cuff march >120  10x ea >90 10x ea  >100 2x10 ea >100 2x10 ea >100 2x10 ea >100 2x10 ea      Pressure cuff lvl 2 march >120  2x ea   5x ea  x10 ea x10 ea       Bird dog       2x10 ea  2x10 ea       Row        20/ 3x10  w/TA  20/ 3x10     Sh ext        16/ 3x10  w/TA  13/ 3x10     Pallof press      15/ 3x10 b/l  18/ 3x10      Pallof rot      12/ 3x10 b/l  12/ 3x10 b/l   12/ 3x10 b/l     Ther Ex 3/7 3/9 3/14 3/16 3/21 3/23 3/28 3/30 4/6    S/l thoracic rotation 10x       10x      ANTONY 10x       3x10      Seated thoracic extension 10x       3x10 4#     Supine piriformis stretch :20x2 ea       :30x2 ea      S/l clamshell 10x ea O 3x10  O 3x10 O 3x10    G 3x10  G 3x10     S/l hip abd 10x ea    Hip 3 way G 3x10         Bridge w/ TB abd  O 3x10 O 3x10 O 3x10    G 3x10  G 3x10     TB Side step  O 10' 3x ea  O 3x10 G 3x10        Hamstring stretch    :30x3 :30x3 :30x3 :30x3  :30x3 :30x2     Adductor stretch    :30x3 :30x3 :30x3 :30x3 :30x3  :30x2     Ther Activity 3/7 3/9 3/14 3/16 3/21 3/23 3/28 5/30 4/6    RB   5' 5' 5' 5' lvl 3 5' lvl 3 5' lvl 3 5' lvl 3                                                        Modalities 3/7 3/9 3/14 3/16 3/21 3/23 3/28 5/30 4/6                 CT

## 2022-04-11 ENCOUNTER — OFFICE VISIT (OUTPATIENT)
Dept: PHYSICAL THERAPY | Facility: CLINIC | Age: 62
End: 2022-04-11
Payer: COMMERCIAL

## 2022-04-11 DIAGNOSIS — G89.29 CHRONIC RIGHT-SIDED LOW BACK PAIN WITH RIGHT-SIDED SCIATICA: Primary | ICD-10-CM

## 2022-04-11 DIAGNOSIS — M54.41 CHRONIC RIGHT-SIDED LOW BACK PAIN WITH RIGHT-SIDED SCIATICA: Primary | ICD-10-CM

## 2022-04-11 PROCEDURE — 97110 THERAPEUTIC EXERCISES: CPT

## 2022-04-11 PROCEDURE — 97140 MANUAL THERAPY 1/> REGIONS: CPT

## 2022-04-11 PROCEDURE — 97112 NEUROMUSCULAR REEDUCATION: CPT

## 2022-04-11 NOTE — PROGRESS NOTES
Daily Note     Today's date: 2022  Patient name: Tino Magdaleno  : 1960  MRN: [id number]  Referring provider: Tariq Falcon PA-C  Dx:   Encounter Diagnosis     ICD-10-CM    1  Chronic right-sided low back pain with right-sided sciatica  M54 41     G89 29                   Subjective: Graham Gerber reports she is no longer experiencing radicular pain all throughout R thigh, more centralized to proximal thigh and buttock region  She states pain is intermittent, but does come on sharp and quick  She has been compliant with HEP, but not all of them  Objective: See treatment diary below      Assessment: Reba tolerated PT treatment well  Initiated session with RB warm up and LE stretching  Hamstring and adductor flexibility is improving, evident with exercises  DB added with lumbar and thoracic mobility exercises to achieve greater range  Pt completed iso hold via BP cuff with less challenge today, able to hold above 120  She remains challenged with level 2 marching, unable to maintain TA contraction throughout full motion of march  TPR performed to glut med and piriformis, pt presents with point tenderness along piriformis  Improved sx's reported following session  Pt would benefit from continued PT to further address impairments and maximize functional level  Plan: Continue per plan of care        Precautions:   Lipoma of torso   Low bone density for age         Manuals 3/7 3/9 3/14 3/16 3/21 3/23 3/28 3/30 4/6 4/11   STM lumbar/thoracic             PA mobs lumbar thoracic             STM R piriformis   JZ JW JW JW  JW  AFB JW   Piriformis, ITB, HS stretch  JZ Librado Sole  JW  AFB JW   MRE hamstring curl           JW   Neuro Re-Ed 3/7 3/9 3/14 3/16 3/21 3/23 3/28 3/30 4/   Pressure cuff abd brace  :30x3 :30x3  :30x3  :30x3  >100 :30x3 >110  :30x3  >120     :30x3  >120   Pressure cuff march >120  10x ea >90 10x ea  >100 2x10 ea >100 2x10 ea >100 2x10 ea >100 2x10 ea   >100 3x10 ea   Pressure cuff lvl 2 march >120  2x ea   5x ea  x10 ea x10 ea       Bird dog       2x10 ea  2x10 ea       Row        20/ 3x10  w/TA  20/ 3x10  20/ 3x10    Sh ext        16/ 3x10  w/TA  13/ 3x10  15/ 3x10    Pallof press      15/ 3x10 b/l  18/ 3x10      Pallof rot      12/ 3x10 b/l  12/ 3x10 b/l   12/ 3x10 b/l     Ther Ex 3/7 3/9 3/14 3/16 3/21 3/23 3/28 3/30 4/6 4/11   S/l thoracic rotation 10x       10x   2# x15    ANTONY 10x       3x10   3x10   Seated thoracic extension 10x       3x10 4#  3x10    Supine piriformis stretch :20x2 ea       :30x2 ea   :30x2   S/l clamshell 10x ea O 3x10  O 3x10 O 3x10    G 3x10  G 3x10     S/l hip abd 10x ea    Hip 3 way G 3x10         Bridge w/ TB abd  O 3x10 O 3x10 O 3x10    G 3x10  G 3x10     TB Side step  O 10' 3x ea  O 3x10 G 3x10        Hamstring stretch    :30x3 :30x3 :30x3 :30x3  :30x3 :30x2  :30x2    Adductor stretch    :30x3 :30x3 :30x3 :30x3 :30x3  :30x2  :30x2    Ther Activity 3/7 3/9 3/14 3/16 3/21 3/23 3/28 5/30 4/6 4/11   RB   5' 5' 5' 5' lvl 3 5' lvl 3 5' lvl 3 5' lvl 3 5' lvl 3                                                       Modalities 3/7 3/9 3/14 3/16 3/21 3/23 3/28 5/30 4/6 4/11                CT

## 2022-04-13 ENCOUNTER — OFFICE VISIT (OUTPATIENT)
Dept: PHYSICAL THERAPY | Facility: CLINIC | Age: 62
End: 2022-04-13
Payer: COMMERCIAL

## 2022-04-13 DIAGNOSIS — M54.41 CHRONIC RIGHT-SIDED LOW BACK PAIN WITH RIGHT-SIDED SCIATICA: Primary | ICD-10-CM

## 2022-04-13 DIAGNOSIS — G89.29 CHRONIC RIGHT-SIDED LOW BACK PAIN WITH RIGHT-SIDED SCIATICA: Primary | ICD-10-CM

## 2022-04-13 PROCEDURE — 97110 THERAPEUTIC EXERCISES: CPT | Performed by: PHYSICAL THERAPIST

## 2022-04-13 PROCEDURE — 97112 NEUROMUSCULAR REEDUCATION: CPT | Performed by: PHYSICAL THERAPIST

## 2022-04-13 PROCEDURE — 97140 MANUAL THERAPY 1/> REGIONS: CPT | Performed by: PHYSICAL THERAPIST

## 2022-04-13 NOTE — PROGRESS NOTES
Daily Note     Today's date: 2022  Patient name: Jesse Molina  : 1960  MRN: [id number]  Referring provider: Maryuri Reid PA-C  Dx:   Encounter Diagnosis     ICD-10-CM    1  Chronic right-sided low back pain with right-sided sciatica  M54 41     G89 29           Subjective: Pt noted that she has been continuing to do her HEP as prescribed  Notes that her pain is improving, "but it has moved to my hamstring a bit "     Objective: See treatment diary below    Assessment: Tolerated treatment well  Patient demonstrated fatigue post treatment, exhibited good technique with therapeutic exercises and would benefit from continued PT  Plan: Continue per plan of care        Precautions:   Lipoma of torso   Low bone density for age         Manuals 4/13   3/28 3/30 4/6 4/11   STM lumbar/thoracic JZ         PA mobs lumbar thoracic JZ         STM R piriformis  JZ   JW  AFB JW   Piriformis, ITB, HS stretch    JW  AFB JW   MRE hamstring curl        JW   Neuro Re-Ed 4/13   3/28 3/30 4/6 4/11   Pressure cuff abd brace    :30x3  >120     :30x3  >120   Pressure cuff march >120    >100 2x10 ea   >100 3x10 ea   Pressure cuff lvl 2 march >120    x10 ea       Bird dog     2x10 ea       Row     20/ 3x10  w/TA  20/ 3x10  20/ 3x10    Sh ext     16/ 3x10  w/TA  13/ 3x10  15/ 3x10    Pallof press 18/ 2x10 ea   18/ 3x10      Pallof rot    12/ 3x10 b/l   12/ 3x10 b/l     Supine bicycles  3x10 ea         Fire hydrant O 3x10 ea                   Ther Ex 4/13   3/28 3/30 4/6 4/11   S/l thoracic rotation     10x   2# x15    ANTONY     3x10   3x10   Supine piriformis stretch     :30x2 ea   :30x2   [place] stretch :30x3 ea         Lizard stretch :30x3 ea         Prone press up :30x3         S/l clamshell     G 3x10  G 3x10     S/l clamshell hip/knee 90-90 O 3x10         S/l hip abd          Bridge w/ TB abd     G 3x10  G 3x10     TB Side step          Hamstring stretch     :30x3 :30x2  :30x2    Adductor stretch     :30x3  :30x2  :30x2 Ther Activity 4/13   3/28 5/30 4/6 4/11   RB 5' lvl3   5' lvl 3 5' lvl 3 5' lvl 3 5' lvl 3                                           Modalities    3/28 5/30 4/6 4/11             CT

## 2022-04-18 ENCOUNTER — OFFICE VISIT (OUTPATIENT)
Dept: PHYSICAL THERAPY | Facility: CLINIC | Age: 62
End: 2022-04-18
Payer: COMMERCIAL

## 2022-04-18 DIAGNOSIS — M54.41 CHRONIC RIGHT-SIDED LOW BACK PAIN WITH RIGHT-SIDED SCIATICA: Primary | ICD-10-CM

## 2022-04-18 DIAGNOSIS — G89.29 CHRONIC RIGHT-SIDED LOW BACK PAIN WITH RIGHT-SIDED SCIATICA: Primary | ICD-10-CM

## 2022-04-18 PROCEDURE — 97140 MANUAL THERAPY 1/> REGIONS: CPT

## 2022-04-18 PROCEDURE — 97110 THERAPEUTIC EXERCISES: CPT

## 2022-04-18 NOTE — PROGRESS NOTES
Daily Note     Today's date: 2022  Patient name: Lashaun Gruber  : 1960  MRN: [id number]  Referring provider: Rylee Do PA-C  Dx:   Encounter Diagnosis     ICD-10-CM    1  Chronic right-sided low back pain with right-sided sciatica  M54 41     G89 29           Subjective: Manjit Krishna reports no significant changes in radicular sx's since last PT session  She notes continued pain in R hamstring, described at "spasms" and "sharp"  Pt states she is unsure if to use hot pack or cold pack at home  Objective: See treatment diary below    Assessment: Reba tolerated PT treatment well  Pt performed LE stretching with good tolerance  She is challenged with addition of glute strengthening exercises  Cueing required to correct contralateral hip drop with fire hydrants, good carryover following  Pt presents with palpable tightness throughout R piriformis, STM and TPR performed to address  No increase in radicular sx's reported following last PT session  Plan: Continue per plan of care        Precautions:   Lipoma of torso   Low bone density for age         Manuals 4/13 4/18  3/28 3/30 4/6 4/11   STM lumbar/thoracic JZ JW        PA mobs lumbar thoracic JZ         STM R piriformis  JZ JW  JW  AFB JW   Piriformis, ITB, HS stretch    JW  AFB JW   MRE hamstring curl        JW   Neuro Re-Ed 4/13 4/18  3/28 3/30 4/6 4/11   Pressure cuff abd brace    :30x3  >120     :30x3  >120   Pressure cuff march >120    >100 2x10 ea   >100 3x10 ea   Pressure cuff lvl 2 march >120    x10 ea       Bird dog     2x10 ea       Row     20/ 3x10  w/TA  20/ 3x10  20/ 3x10    Sh ext     16/ 3x10  w/TA  13/ 3x10  15/ 3x10    Pallof press 18/ 2x10 ea   18/ 3x10      Pallof rot    12/ 3x10 b/l   12/ 3x10 b/l     Supine bicycles  3x10 ea         Fire hydrant O 3x10 ea O 3x10 ea                   Ther Ex 4/13 4/18  3/28 3/30 4/6 4/11   S/l thoracic rotation     10x   2# x15    ANTONY     3x10   3x10   Supine piriformis stretch     :30x2 ea :30x2   [place] stretch :30x3 ea :30x3         Lizard stretch :30x3 ea :30x3         Prone press up :30x3         S/l clamshell     G 3x10  G 3x10     S/l clamshell hip/knee 90-90 O 3x10 O 3x10         S/l hip abd          Bridge w/ TB abd  U/l 3x10    G 3x10  G 3x10     TB Side step          Hamstring stretch   :30x3   :30x3 :30x2  :30x2    Adductor stretch     :30x3  :30x2  :30x2    Ther Activity 4/13 4/18  3/28 5/30 4/6 4/11   RB 5' lvl3 5' lvl3   5' lvl 3 5' lvl 3 5' lvl 3 5' lvl 3                                           Modalities  4/18  3/28 5/30 4/6 4/11             CT

## 2022-04-20 ENCOUNTER — EVALUATION (OUTPATIENT)
Dept: PHYSICAL THERAPY | Facility: CLINIC | Age: 62
End: 2022-04-20
Payer: COMMERCIAL

## 2022-04-20 DIAGNOSIS — G89.29 CHRONIC RIGHT-SIDED LOW BACK PAIN WITH RIGHT-SIDED SCIATICA: Primary | ICD-10-CM

## 2022-04-20 DIAGNOSIS — M54.41 CHRONIC RIGHT-SIDED LOW BACK PAIN WITH RIGHT-SIDED SCIATICA: Primary | ICD-10-CM

## 2022-04-20 PROCEDURE — 97140 MANUAL THERAPY 1/> REGIONS: CPT | Performed by: PHYSICAL THERAPIST

## 2022-04-20 PROCEDURE — 97110 THERAPEUTIC EXERCISES: CPT | Performed by: PHYSICAL THERAPIST

## 2022-04-20 PROCEDURE — 97112 NEUROMUSCULAR REEDUCATION: CPT | Performed by: PHYSICAL THERAPIST

## 2022-04-20 NOTE — PROGRESS NOTES
PT Re-Evaluation     Today's date: 3/7/2022  Patient name: Brandon Alcaraz  : 1960  MRN: [id number]  Referring provider: Ashlee Baeza PA-C  Dx:   Encounter Diagnosis     ICD-10-CM    1  Chronic right-sided low back pain with right-sided sciatica  M54 41     G89 29          Assessment  Assessment details:   Since beginning PT Reba reports GROC improvement of 50%  Noted improvements include improved strength, ROM, joint mobility, which has resulted in improved functional ability  Despite improvements she continues to present with pain, decreased strength, decreased ROM, decreased joint mobility, joint effusion, ambulatory dysfunction and postural  dysfunction  Due to these impairments, patient has difficulty performing ADL's, recreational activities, work-related activities, engaging in social activities, ambulation, stair negotiation, lifting/carrying, transfers  Patient's clinical presentation is consistent with their referring diagnosis of Chronic right-sided low back pain with right-sided sciatica  (primary encounter diagnosis)  Patient has been educated in home exercise program and plan of care  Patient would benefit from skilled physical therapy services to address their aforementioned functional limitations and progress towards prior level of function and independence with home exercise program      Impairments: abnormal gait, abnormal or restricted ROM, activity intolerance, impaired balance, impaired physical strength, lacks appropriate home exercise program, pain with function, poor posture  and poor body mechanics  Functional limitations: prolonged sit, stand/walk, bending, lifting/carrying    Prognosis: good  Prognosis details: + factors: high motivation levels,   - factors: none    Goals  Short term goals to be accomplished in 4 weeks:  STG 1: Pt will demo independence with postural management and HEP  Achieved     STG 2: Pt will demo L/S AROM < or = min loss throughout to promote improved functional mobility and body mechanics  Achieved  STG 3: Pt will reports pain dec freq and intensity 50%  Achieved  Long term goals to be accomplished in 12 weeks:  LTG 1: Pt will demo good body mech with >75% functional challenges to prevent reinjury  LTG 2: Pt will be able to return to sit for 2 hours pain free as per PLOF  LTG3: Pt will have no pain for 1 week with any functional activities  Plan  Plan details: HEP development, stretching, strengthening, A/AA/PROM, joint mobilizations, posture education, STM/MI as needed to reduce muscle tension, muscle reeducation, PLOC discussed and agreed upon with patient  Patient would benefit from: PT eval and skilled physical therapy  Planned modality interventions: cryotherapy, thermotherapy: hydrocollator packs and unattended electrical stimulation  Planned therapy interventions: manual therapy, neuromuscular re-education, self care, therapeutic activities, therapeutic exercise, home exercise program, balance, joint mobilization, postural training, patient education, strengthening, stretching and flexibility  Frequency: 2x week  Plan of Care beginning date: 3/7/2022  Plan of Care expiration date: 2022  Treatment plan discussed with: patient      Subjective Evaluation    History of Present Illness  Mechanism of injury: Pt is a 58 y/o female who presents to PT with R sided lumbar pain with sciatica that began about 1 month ago  Went to her PCP on 22 where she was prescribed PT to  Address her lumbar pain  Pain at worst with prolonged sitting     Pain  Current pain ratin  At best pain ratin  At worst pain ratin  Location: R lumbar/sciatica  Quality: throbbing, radiating, pulling, discomfort and tight  Aggravating factors: stair climbing, walking, standing, sitting and lifting    Treatments  Current treatment: physical therapy  Patient Goals  Patient goals for therapy: increased motion, decreased pain, increased strength, independence with ADLs/IADLs and return to sport/leisure activities  Patient goal: prolonged sit, stand/walk, bending, lifting/carrying     Objective     Concurrent Complaints  Negative for night pain, disturbed sleep, bladder dysfunction, bowel dysfunction and saddle (S4) numbness    Postural Observations  Seated posture: fair  Standing posture: fair  Correction of posture: has no consistent effect    Tenderness     Additional Tenderness Details  TTP over lumbar and thoracic paraspinals, and R piriformis     Active Range of Motion   Cervical/Thoracic Spine     Thoracic    Extension:  Restriction level: moderate    Lumbar   Extension:  Restriction level: minimum  Left lateral flexion:  Restriction level: minimum  Right lateral flexion:  Restriction level: minimum   Left rotation:  Restriction level: minimum   Right rotation:  Restriction level: minimum    Joint Play     Hypomobile: T8, T9, T10, T11, T12, L1, L2, L3, L4, L5 and S1     Strength/Myotome Testing     Left Hip   Planes of Motion   Flexion: 5  Abduction: 3    Right Hip   Planes of Motion   Flexion: 5  Abduction: 3    Left Knee   Flexion: 4+  Extension: 4+    Right Knee   Flexion: 4+  Extension: 4+    Left Ankle/Foot   Dorsiflexion: 4    Right Ankle/Foot   Dorsiflexion: 4    Muscle Activation     Additional Muscle Activation Details      Tests     Lumbar     Left   Negative quadrant  Right   Negative quadrant  Right Hip   Positive KISHA          Precautions:   Lipoma of torso   Low bone density for age     Manuals 4/13 4/18 4/20      STM lumbar/thoracic JZ RICHARD       PA mobs lumbar thoracic JZ        STM R piriformis  JZ  JJERROD      Piriformis, ITB, HS stretch         MRE hamstring curl          MRE hip abd supine    JZ               Neuro Re-Ed 4/13 4/18 4/20      Plank   :30x3      Side plank from knees    :30x2 ea      S/l hip abduction arch over 1/2 foam   2x10 ea      Bird dog          Row         Sh ext         Pallof press 18/ 2x10 ea        Pallof rot Supine bicycles  3x10 ea        Fire hydrant O 3x10 ea O 3x10 ea        Updated HEP   5'               Ther Ex 4/13 4/18 4/20      S/l thoracic rotation         ANTONY         S/l hip abduction   4x10 ea      Supine piriformis stretch         [place] stretch :30x3 ea :30x3        Lizard stretch :30x3 ea :30x3        Prone press up :30x3        S/l clamshell         S/l clamshell hip/knee 90-90 O 3x10 O 3x10        Reverse clamshell   O 3x10      Seated b/l hip IR   O 3x10      S/l hip abd   3x10 ea      Bridge w/ TB abd  U/l 3x10        TB Side step         Hamstring stretch   :30x3        ITB stretch seated, box    :30x3      Ther Activity 4/13 4/18 4/20      RB 5' lvl3 5' lvl3        Elliptical   5'                                  Modalities  4/18       [address]         CT

## 2022-04-25 ENCOUNTER — APPOINTMENT (OUTPATIENT)
Dept: PHYSICAL THERAPY | Facility: CLINIC | Age: 62
End: 2022-04-25
Payer: COMMERCIAL

## 2022-04-27 ENCOUNTER — OFFICE VISIT (OUTPATIENT)
Dept: PHYSICAL THERAPY | Facility: CLINIC | Age: 62
End: 2022-04-27
Payer: COMMERCIAL

## 2022-04-27 DIAGNOSIS — G89.29 CHRONIC RIGHT-SIDED LOW BACK PAIN WITH RIGHT-SIDED SCIATICA: Primary | ICD-10-CM

## 2022-04-27 DIAGNOSIS — M54.41 CHRONIC RIGHT-SIDED LOW BACK PAIN WITH RIGHT-SIDED SCIATICA: Primary | ICD-10-CM

## 2022-04-27 PROCEDURE — 97140 MANUAL THERAPY 1/> REGIONS: CPT | Performed by: PHYSICAL THERAPIST

## 2022-04-27 PROCEDURE — 97112 NEUROMUSCULAR REEDUCATION: CPT | Performed by: PHYSICAL THERAPIST

## 2022-04-27 PROCEDURE — 97110 THERAPEUTIC EXERCISES: CPT | Performed by: PHYSICAL THERAPIST

## 2022-05-09 ENCOUNTER — OFFICE VISIT (OUTPATIENT)
Dept: PHYSICAL THERAPY | Facility: CLINIC | Age: 62
End: 2022-05-09
Payer: COMMERCIAL

## 2022-05-09 DIAGNOSIS — G89.29 CHRONIC RIGHT-SIDED LOW BACK PAIN WITH RIGHT-SIDED SCIATICA: Primary | ICD-10-CM

## 2022-05-09 DIAGNOSIS — M54.41 CHRONIC RIGHT-SIDED LOW BACK PAIN WITH RIGHT-SIDED SCIATICA: Primary | ICD-10-CM

## 2022-05-09 PROCEDURE — 97110 THERAPEUTIC EXERCISES: CPT

## 2022-05-09 PROCEDURE — 97112 NEUROMUSCULAR REEDUCATION: CPT

## 2022-05-09 NOTE — PROGRESS NOTES
Daily Note     Today's date: 2022  Patient name: Lashaun Gruber  : 1960  MRN: [id number]  Referring provider: Rylee Do PA-C  Dx:   Encounter Diagnosis     ICD-10-CM    1  Chronic right-sided low back pain with right-sided sciatica  M54 41     G89 29           Subjective: Pt states she has been on her feet a lot more which has been feeling pretty good over all  Pt states but she continues to have a painful sensation at night  behind the right knee  Pt states he has that painful sensation today with 2/10 pain  Pt states she hasn't been doing he exercises at home as frequently d/t her schedule  being a bit more busy this past week  Objective: See treatment diary below    Assessment: Pt had abolished symptoms behind her knee post prone press ups  Pt was given prone press ups for at home  Tolerated treatment well as she has little to no pain post PT session  Pt was educated on core satiability with clamshells today  Patient would benefit from continued PT  Plan: Continue per plan of care        Precautions:   Lipoma of torso   Low bone density for age     [facility]      STM lumbar/thoracic JZ JW       PA mobs lumbar thoracic JZ        STM R piriformis  JZ JW JZ JZ CF    Piriformis, ITB, HS stretch         MRE hamstring curl     JZ     MRE hip abd supine    JZ JZ              Neuro Re-Ed      Plank   :30x3 Straight arm :30x2 Straight arm :30x2    Side plank from knees    :30x2 ea :30x2 ea :30x2 ea    Pallof press 18/ 2x10 ea        Pallof rot         Supine bicycles  3x10 ea        Fire hydrant O 3x10 ea O 3x10 ea        Updated HEP   5' 5'              Ther Ex  5     ANTONY         Supine piriformis stretch         Squat with hip ER, TB around knees    B 3x10 B 3 x 10     Supine clamshell TB, hip angles 90, 70, 45, 25     B 10x ea angle B 10x ea     Prone press up :30x3  :30x2 :30x2 3 x 10     Reverse clamshell   O 3x10      Seated b/l hip IR   O 3x10      S/l hip abd   3x10 ea 2x10 ea 2 x 10 ea    Hamstring stretch   :30x3        ITB stretch seated, box    :30x3 :30x2 ea 30" x 2                       Ther Activity 4/13 4/18 4/20 4/27     RB 5' lvl3 5' lvl3        Elliptical    5' 5'                                Modalities  4/18 4/27     [address]         CT

## 2022-05-11 ENCOUNTER — OFFICE VISIT (OUTPATIENT)
Dept: PHYSICAL THERAPY | Facility: CLINIC | Age: 62
End: 2022-05-11
Payer: COMMERCIAL

## 2022-05-11 DIAGNOSIS — G89.29 CHRONIC RIGHT-SIDED LOW BACK PAIN WITH RIGHT-SIDED SCIATICA: Primary | ICD-10-CM

## 2022-05-11 DIAGNOSIS — M54.41 CHRONIC RIGHT-SIDED LOW BACK PAIN WITH RIGHT-SIDED SCIATICA: Primary | ICD-10-CM

## 2022-05-11 PROCEDURE — 97112 NEUROMUSCULAR REEDUCATION: CPT

## 2022-05-11 PROCEDURE — 97140 MANUAL THERAPY 1/> REGIONS: CPT

## 2022-05-11 PROCEDURE — 97110 THERAPEUTIC EXERCISES: CPT

## 2022-05-11 NOTE — PROGRESS NOTES
Daily Note     Today's date: 2022  Patient name: Lola Saavedra  : 1960  MRN: [id number]  Referring provider: Tino Lr PA-C  Dx:   Encounter Diagnosis     ICD-10-CM    1  Chronic right-sided low back pain with right-sided sciatica  M54 41     G89 29    Start Time: 1745  Stop Time: 1830  Total time in clinic (min): 45 minutes     Subjective: Patient states that she has (L) sided hip pain possibly due to sleeping with memory foam pillow between knees at night  She continues with (R) HS pain at night  Objective: See treatment diary below    Assessment: Reba abolished symptoms post manuals  Manual stretching added this visit to assist to reduce ITB/ glute tightness  Patient had good tolerance to TE with most challenge with side planks  Patient would benefit from continued PT to decrease (R) side l/s radicular symptoms  Plan: Continue per plan of care        Precautions:   Lipoma of torso   Low bone density for [facility]    STM lumbar/thoracic JZ JW       PA mobs lumbar thoracic JZ        STM R piriformis  JZ JW JZ JZ CF LQ   Piriformis, ITB, HS stretch      LQ   MRE hamstring curl     JZ     MRE hip abd supine    JZ JZ              Neuro Re-Ed    Plank   :30x3 Straight arm :30x2 Straight arm :30x2 Straight arm :30x2   Side plank from knees    :30x2 ea :30x2 ea :30x2 ea :30x2 ea   Pallof press 18/ 2x10 ea        Pallof rot         Supine bicycles  3x10 ea        Fire hydrant O 3x10 ea O 3x10 ea        Updated HEP   5' 5'              Ther Ex    ANTONY         Supine piriformis stretch         Squat with hip ER, TB around knees    B 3x10 B 3 x 10  B 3 x 10    Supine clamshell TB, hip angles 90, 70, 45, 25     B 10x ea angle B 10x ea  B 10x ea    Prone press up :30x3  :30x2 :30x2 3 x 10  3x10   Reverse clamshell   O 3x10      Seated b/l hip IR   O 3x10      S/l hip abd   3x10 ea 2x10 ea 2 x 10 ea 3 x 10 ea Hamstring stretch   :30x3        ITB stretch seated, box    :30x3 :30x2 ea 30" x 2  30" x 2 ea                     Ther Activity 4/13 4/18 4/20 4/27 5/11   RB 5' lvl3 5' lvl3        Elliptical    5' 5'  5'                              Modalities  4/18 4/27 5/11            CT

## 2022-05-16 ENCOUNTER — OFFICE VISIT (OUTPATIENT)
Dept: PHYSICAL THERAPY | Facility: CLINIC | Age: 62
End: 2022-05-16
Payer: COMMERCIAL

## 2022-05-16 DIAGNOSIS — G89.29 CHRONIC RIGHT-SIDED LOW BACK PAIN WITH RIGHT-SIDED SCIATICA: Primary | ICD-10-CM

## 2022-05-16 DIAGNOSIS — M54.41 CHRONIC RIGHT-SIDED LOW BACK PAIN WITH RIGHT-SIDED SCIATICA: Primary | ICD-10-CM

## 2022-05-16 PROCEDURE — 97110 THERAPEUTIC EXERCISES: CPT

## 2022-05-16 PROCEDURE — 97140 MANUAL THERAPY 1/> REGIONS: CPT

## 2022-05-16 PROCEDURE — 97112 NEUROMUSCULAR REEDUCATION: CPT

## 2022-05-16 NOTE — PROGRESS NOTES
Daily Note     Today's date: 2022  Patient name: Maribeth Herron  : 1960  MRN: [id number]  Referring provider: Solomon Meraz PA-C  Dx:   Encounter Diagnosis     ICD-10-CM    1  Chronic right-sided low back pain with right-sided sciatica  M54 41     G89 29                Subjective: Ricardo Souza reports she is still experiencing pain along back and inside of R thigh  She describes sx's as "tightness" and "pulling", worsening with prolonged sitting  Objective: See treatment diary below    Assessment: Reba tolerated PT treatment well  Soft tissue massage performed along medial hamstring and adductor musculature  Pt presenting with palpable tightness and tenderness throughout  Passive hamstring and adductor stretch performed to address flexibility  Pt reporting abolished sx's post manual intervention  Continued with emphasis on glute strengthening  Pt very challenged with prescribed exercises  Pt would benefit from continued PT to further address impairments and maximize functional level  Plan: Continue per plan of care        Precautions:   Lipoma of torso   Low bone density for age     [facility]    STM lumbar/thoracic         PA mobs lumbar thoracic         STM R piriformis  JW  JZ JZ CF LQ   Piriformis, ITB, HS stretch JW     LQ   MRE hamstring curl     JZ     MRE hip abd supine  JW  JZ JZ              Neuro Re-Ed    Plank   :30x3 Straight arm :30x2 Straight arm :30x2 Straight arm :30x2   Side plank from knees    :30x2 ea :30x2 ea :30x2 ea :30x2 ea   Pallof press         Pallof rot         Supine bicycles          Fire hydrant         Updated HEP   5' 5'     Seated and supine sciatic nerve glides  x15 ea         Ther Ex    ANTONY         Supine piriformis stretch :20x3         Squat with hip ER, TB around knees B 3x10 ea    B 3x10 B 3 x 10  B 3 x 10    Supine clamshell TB, hip angles 90, 70, 45, 25  B 10x ea    B 10x ea angle B 10x ea  B 10x ea    Prone press up 3x10   :30x2 :30x2 3 x 10  3x10   Reverse clamshell   O 3x10      Seated b/l hip IR   O 3x10      S/l hip abd   3x10 ea 2x10 ea 2 x 10 ea 3 x 10 ea   Hamstring stretch  Manual         ITB stretch seated, box  Manual   :30x3 :30x2 ea 30" x 2  30" x 2 ea                     Ther Activity 5/16 4/20 4/27 5/11   RB         Elliptical 5'   5' 5'  5'                              Modalities    4/27 5/11            CT

## 2022-05-18 ENCOUNTER — OFFICE VISIT (OUTPATIENT)
Dept: PHYSICAL THERAPY | Facility: CLINIC | Age: 62
End: 2022-05-18
Payer: COMMERCIAL

## 2022-05-18 DIAGNOSIS — G89.29 CHRONIC RIGHT-SIDED LOW BACK PAIN WITH RIGHT-SIDED SCIATICA: Primary | ICD-10-CM

## 2022-05-18 DIAGNOSIS — M54.41 CHRONIC RIGHT-SIDED LOW BACK PAIN WITH RIGHT-SIDED SCIATICA: Primary | ICD-10-CM

## 2022-05-18 PROCEDURE — 97110 THERAPEUTIC EXERCISES: CPT | Performed by: PHYSICAL THERAPIST

## 2022-05-18 PROCEDURE — 97140 MANUAL THERAPY 1/> REGIONS: CPT | Performed by: PHYSICAL THERAPIST

## 2022-05-18 PROCEDURE — 97112 NEUROMUSCULAR REEDUCATION: CPT | Performed by: PHYSICAL THERAPIST

## 2022-05-18 NOTE — PROGRESS NOTES
Daily Note     Today's date: 2022  Patient name: Subhash Vasquez  : 1960  MRN: [id number]  Referring provider: Jerod Falcon PA-C  Dx:   Encounter Diagnosis     ICD-10-CM    1  Chronic right-sided low back pain with right-sided sciatica  M54 41     G89 29           Subjective: Pt reported that she is improving with her motion and strength, however she still does have pain in her hamstring  Objective: See treatment diary below    Assessment: Tolerated treatment well  Patient demonstrated fatigue post treatment, exhibited good technique with therapeutic exercises and would benefit from continued PT  Plan: Continue per plan of care        Precautions:   Lipoma of torso   Low bone density for age     [facility]    STM R piriformis  JW JZ hamstring   JZ CF LQ   Piriformis, ITB, HS stretch JW JZ    LQ   MRE hamstring curl   JZ prone   JZ     MRE hip abd supine  JW   JZ              Neuro Re-Ed    Plank  Straight arm :30x2  Straight arm :30x2 Straight arm :30x2 Straight arm :30x2   Side plank from knees     :30x2 ea :30x2 ea :30x2 ea   Pallof press         Pallof rot         Fire hydrant         Updated HEP  5'  5'     Seated and supine sciatic nerve glides  x15 ea  x15 ea       Supine hip ER TB around toes, straight leg to ER  G 3x10 ea                Ther Ex             ANTONY         Supine piriformis stretch :20x3  :20x2       Squat with hip ER, TB around knees B 3x10 ea    B 3x10 B 3 x 10  B 3 x 10    Supine clamshell TB, hip angles 90, 70, 45, 25  B 10x ea    B 10x ea angle B 10x ea  B 10x ea    Prone press up 3x10  3x10  :30x2 3 x 10  3x10   Reverse clamshell         Seated b/l hip IR  G 3x10       S/l hip abd    2x10 ea 2 x 10 ea 3 x 10 ea   Hamstring stretch  Manual         ITB stretch seated, box  Manual    :30x2 ea 30" x 2  30" x 2 ea   bridge  20x  Fig4   2x10 ea                Ther Activity    RB Elliptical 5' 5'   5' 5'  5'                              Modalities    4/27 5/11            CT

## 2022-05-23 ENCOUNTER — OFFICE VISIT (OUTPATIENT)
Dept: PHYSICAL THERAPY | Facility: CLINIC | Age: 62
End: 2022-05-23
Payer: COMMERCIAL

## 2022-05-23 DIAGNOSIS — G89.29 CHRONIC RIGHT-SIDED LOW BACK PAIN WITH RIGHT-SIDED SCIATICA: Primary | ICD-10-CM

## 2022-05-23 DIAGNOSIS — M54.41 CHRONIC RIGHT-SIDED LOW BACK PAIN WITH RIGHT-SIDED SCIATICA: Primary | ICD-10-CM

## 2022-05-23 PROCEDURE — 97110 THERAPEUTIC EXERCISES: CPT | Performed by: PHYSICAL THERAPIST

## 2022-05-23 PROCEDURE — 97140 MANUAL THERAPY 1/> REGIONS: CPT | Performed by: PHYSICAL THERAPIST

## 2022-05-23 NOTE — PROGRESS NOTES
Daily Note     Today's date: 2022  Patient name: Ion Gonzalez  : 1960  MRN: [id number]  Referring provider: Shayd Araujo PA-C  Dx:   Encounter Diagnosis     ICD-10-CM    1  Chronic right-sided low back pain with right-sided sciatica  M54 41     G89 29           Subjective: Pt noted that she is having more pain than usual  She stated that she is not sure why she is having so much hamstring pain  Objective: See treatment diary below    Assessment: Tolerated treatment well  Focused on manual treatment to decrease pain  Patient demonstrated fatigue post treatment, exhibited good technique with therapeutic exercises and would benefit from continued PT  Plan: Continue per plan of care        Precautions:   Lipoma of torso   Low bone density for age     Manuals    STM R piriformis  JW JZ hamstring  JZ   LQ   Piriformis, ITB, HS stretch JW JZ JZ   LQ   MRE hamstring curl   JZ prone  JZ      MRE hip abd supine  JW                 Neuro Re-Ed    Plank  Straight arm :30x2    Straight arm :30x2   Side plank from knees       :30x2 ea   Pallof press         Pallof rot         Fire hydrant         Updated HEP  5'       Seated and supine sciatic nerve glides  x15 ea  x15 ea       Supine hip ER TB around toes, straight leg to ER  G 3x10 ea                Ther Ex             ANTONY         Supine piriformis stretch :20x3  :20x2 :20x2 ea      Squat with hip ER, TB around knees B 3x10 ea      B 3 x 10    Supine clamshell TB, hip angles 90, 70, 45, 25  B 10x ea      B 10x ea    Prone press up 3x10  3x10 2x10   3x10   Reverse clamshell         Seated b/l hip IR  G 3x10       S/l hip abd      3 x 10 ea   Hamstring stretch  Manual   :20x2      ITB stretch seated, box  Manual   :20x2   30" x 2 ea   bridge  20x  Fig4   2x10 ea                Ther Activity    RB         Elliptical 5' 5'     5'                              Modalities 5/11            CT

## 2022-06-01 ENCOUNTER — OFFICE VISIT (OUTPATIENT)
Dept: PHYSICAL THERAPY | Facility: CLINIC | Age: 62
End: 2022-06-01
Payer: COMMERCIAL

## 2022-06-01 DIAGNOSIS — G89.29 CHRONIC RIGHT-SIDED LOW BACK PAIN WITH RIGHT-SIDED SCIATICA: Primary | ICD-10-CM

## 2022-06-01 DIAGNOSIS — M54.41 CHRONIC RIGHT-SIDED LOW BACK PAIN WITH RIGHT-SIDED SCIATICA: Primary | ICD-10-CM

## 2022-06-01 PROCEDURE — 97110 THERAPEUTIC EXERCISES: CPT | Performed by: PHYSICAL THERAPIST

## 2022-06-01 PROCEDURE — 97140 MANUAL THERAPY 1/> REGIONS: CPT | Performed by: PHYSICAL THERAPIST

## 2022-06-01 NOTE — PROGRESS NOTES
Daily Note     Today's date: 2022  Patient name: Chay Hanna  : 1960  MRN: [id number]  Referring provider: Negra Rea PA-C  Dx:   Encounter Diagnosis     ICD-10-CM    1  Chronic right-sided low back pain with right-sided sciatica  M54 41     G89 29           Subjective: Pt noted that she has been still wrestling with the hamstring and adductor soreness  Noted that her back symptoms "are much better "     Objective: See treatment diary below    Assessment: Tolerated treatment well  Patient demonstrated fatigue post treatment, exhibited good technique with therapeutic exercises and would benefit from continued PT  Plan: Continue per plan of care        Precautions:   Lipoma of torso   Low bone density for age     Manuals      STM R piriformis  JW JZ hamstring  JZ Adductor/HS JZ     Piriformis, ITB, HS stretch JW JZ JZ Adductor/HS JZ     MRE hamstring curl   JZ prone  JZ      MRE hip abd supine  JW                 Neuro Re-Ed      Plank  Straight arm :30x2       Side plank from knees          Pallof press         Pallof rot         Fire hydrant         Updated HEP  5'       Seated and supine sciatic nerve glides  x15 ea  x15 ea       Supine hip ER TB around toes, straight leg to ER  G 3x10 ea                Ther Ex      SLR    3x15     ANTONY         Supine piriformis stretch :20x3  :20x2 :20x2 ea      Squat with hip ER, TB around knees B 3x10 ea         Supine clamshell TB B 10x ea    B      Prone press up 3x10  3x10 2x10      Reverse clamshell         Seated b/l hip IR  G 3x10       S/l hip abd    3x15     Hamstring stretch  Manual   :20x2 Supine active 2x10     ITB stretch seated, box  Manual   :20x2      bridge  20x  Fig4   2x10 ea  3x15     S/l hip adduction     3x10              Ther Activity      RB         Elliptical 5' 5'                                   Modalities                  CT

## 2022-06-06 ENCOUNTER — APPOINTMENT (OUTPATIENT)
Dept: PHYSICAL THERAPY | Facility: CLINIC | Age: 62
End: 2022-06-06
Payer: COMMERCIAL

## 2022-06-08 ENCOUNTER — APPOINTMENT (OUTPATIENT)
Dept: PHYSICAL THERAPY | Facility: CLINIC | Age: 62
End: 2022-06-08
Payer: COMMERCIAL

## 2022-06-13 ENCOUNTER — APPOINTMENT (OUTPATIENT)
Dept: PHYSICAL THERAPY | Facility: CLINIC | Age: 62
End: 2022-06-13
Payer: COMMERCIAL

## 2022-06-20 ENCOUNTER — OFFICE VISIT (OUTPATIENT)
Dept: PHYSICAL THERAPY | Facility: CLINIC | Age: 62
End: 2022-06-20
Payer: COMMERCIAL

## 2022-06-20 DIAGNOSIS — M54.41 CHRONIC RIGHT-SIDED LOW BACK PAIN WITH RIGHT-SIDED SCIATICA: Primary | ICD-10-CM

## 2022-06-20 DIAGNOSIS — G89.29 CHRONIC RIGHT-SIDED LOW BACK PAIN WITH RIGHT-SIDED SCIATICA: Primary | ICD-10-CM

## 2022-06-20 PROCEDURE — 97140 MANUAL THERAPY 1/> REGIONS: CPT | Performed by: PHYSICAL THERAPIST

## 2022-06-20 PROCEDURE — 97110 THERAPEUTIC EXERCISES: CPT | Performed by: PHYSICAL THERAPIST

## 2022-06-20 PROCEDURE — 97112 NEUROMUSCULAR REEDUCATION: CPT | Performed by: PHYSICAL THERAPIST

## 2022-06-20 NOTE — PROGRESS NOTES
Daily Note     Today's date: 2022  Patient name: Tracey Marvin  : 1960  MRN: [id number]  Referring provider: Ruma Bonner PA-C  Dx:   Encounter Diagnosis     ICD-10-CM    1  Chronic right-sided low back pain with right-sided sciatica  M54 41     G89 29           Subjective: Pt noted that she took 2 weeks of PT off because of pain  Noted that "I am starting to get back to normal "     Objective: See treatment diary below    Assessment: Tolerated treatment well  Patient demonstrated fatigue post treatment, exhibited good technique with therapeutic exercises and would benefit from continued PT  Plan: Continue per plan of care        Precautions:   Lipoma of torso   Low bone density for age     Manuals     STM R piriformis  JW JZ hamstring  JZ Adductor/HS JZ JZ    Piriformis, ITB, HS stretch JW JZ JZ Adductor/HS JZ JZ    MRE hamstring curl   JZ prone  JZ      MRE hip abd supine  JW                 Neuro Re-Ed     Plank  Straight arm :30x2       Side plank from knees          Pallof press         Pallof rot         Fire hydrant         Updated HEP  5'   5'     Seated sciatic nerve glides  x15 ea  x15 ea   15x2 ea    Supine sciatic nerve glide     15x2 ea    Incline push up     3x10                      Ther Ex     SLR    3x15 3x10    ANTONY         Supine piriformis stretch :20x3  :20x2 :20x2 ea  :20x2 ea    Squat with hip ER, TB around knees B 3x10 ea         Supine clamshell TB B 10x ea    B      Prone press up 3x10  3x10 2x10      Reverse clamshell         Seated b/l hip IR  G 3x10       S/l hip abd    3x15 3x10 ea    Hamstring stretch  Manual   :20x2 Supine active 2x10     ITB stretch seated, box  Manual   :20x2      bridge  20x  Fig4   2x10 ea  3x15 3x10    S/l hip adduction     3x10 2x10             Ther Activity     RB         Elliptical 5' 5'                                   Modalities         MH CT

## 2022-06-27 ENCOUNTER — APPOINTMENT (OUTPATIENT)
Dept: PHYSICAL THERAPY | Facility: CLINIC | Age: 62
End: 2022-06-27
Payer: COMMERCIAL

## 2022-06-28 NOTE — PROGRESS NOTES
Rahul Mcclellan has elected to discontinue PT  Stated that she will be returning to her doctor for a follow up

## 2022-08-30 ENCOUNTER — OFFICE VISIT (OUTPATIENT)
Dept: FAMILY MEDICINE CLINIC | Facility: CLINIC | Age: 62
End: 2022-08-30
Payer: COMMERCIAL

## 2022-08-30 VITALS
SYSTOLIC BLOOD PRESSURE: 100 MMHG | TEMPERATURE: 96.6 F | HEART RATE: 75 BPM | DIASTOLIC BLOOD PRESSURE: 80 MMHG | HEIGHT: 68 IN | OXYGEN SATURATION: 95 % | WEIGHT: 170.4 LBS | BODY MASS INDEX: 25.82 KG/M2

## 2022-08-30 DIAGNOSIS — Z13.29 SCREENING FOR THYROID DISORDER: ICD-10-CM

## 2022-08-30 DIAGNOSIS — M54.41 ACUTE RIGHT-SIDED LOW BACK PAIN WITH RIGHT-SIDED SCIATICA: Primary | ICD-10-CM

## 2022-08-30 DIAGNOSIS — Z12.31 ENCOUNTER FOR SCREENING MAMMOGRAM FOR MALIGNANT NEOPLASM OF BREAST: ICD-10-CM

## 2022-08-30 DIAGNOSIS — Z13.220 SCREENING FOR LIPID DISORDERS: ICD-10-CM

## 2022-08-30 DIAGNOSIS — Z13.0 SCREENING FOR DEFICIENCY ANEMIA: ICD-10-CM

## 2022-08-30 DIAGNOSIS — Z13.29 SCREENING FOR ENDOCRINE DISORDER: ICD-10-CM

## 2022-08-30 PROCEDURE — 3725F SCREEN DEPRESSION PERFORMED: CPT | Performed by: NURSE PRACTITIONER

## 2022-08-30 PROCEDURE — 99213 OFFICE O/P EST LOW 20 MIN: CPT | Performed by: NURSE PRACTITIONER

## 2022-08-30 NOTE — PROGRESS NOTES
Assessment/Plan:    Acute right-sided low back pain with right-sided sciatica  Check xray, naproxen BID, stretching, body mechanics reviewed       Diagnoses and all orders for this visit:    Acute right-sided low back pain with right-sided sciatica  -     XR spine lumbar minimum 4 views non injury; Future    Screening for deficiency anemia  -     Mammo screening bilateral w 3d & cad; Future  -     CBC and differential; Future  -     CBC and differential    Screening for endocrine disorder  -     Mammo screening bilateral w 3d & cad; Future  -     CBC and differential; Future  -     CBC and differential    Screening for lipid disorders  -     Lipid panel; Future  -     Lipid panel    Screening for thyroid disorder  -     TSH, 3rd generation with Free T4 reflex; Future  -     TSH, 3rd generation with Free T4 reflex    Encounter for screening mammogram for malignant neoplasm of breast  -     Comprehensive metabolic panel; Future  -     Comprehensive metabolic panel          Subjective:      Patient ID: Kelsie Lagunas is a 57 y o  female  Right side buttock pain that radiates down into her right leg since January  Did PT- helped take edge off x 3 months  Yesterday worse shooting pain that comes and goes like a pulse  Started originally on the outer aspect of her leg, and now its more localized to inner right thigh  No loss of bowel or bladder function  Has tried ice or heat- doesn't notice an improvement with either  Sometimes has tingling sensation  Wakes her up at night time  Leg doesn't feel unstable underneath her  In evening will take tylenol and then naproxen other days  Uses Aspercreme as well with minimal effect, salon pas which is slightly helpful          The following portions of the patient's history were reviewed and updated as appropriate: allergies, current medications, past family history, past medical history, past social history, past surgical history and problem list     Review of Systems Constitutional: Negative  Respiratory: Negative  Cardiovascular: Negative  Gastrointestinal: Negative  Musculoskeletal: Positive for back pain  Neurological: Positive for numbness (tingling in right leg)  Negative for weakness  Objective:      /80   Pulse 75   Temp (!) 96 6 °F (35 9 °C)   Ht 5' 8" (1 727 m)   Wt 77 3 kg (170 lb 6 4 oz)   SpO2 95%   BMI 25 91 kg/m²          Physical Exam  Vitals and nursing note reviewed  Constitutional:       Appearance: Normal appearance  Cardiovascular:      Rate and Rhythm: Regular rhythm  Heart sounds: Normal heart sounds  Pulmonary:      Breath sounds: Normal breath sounds  Musculoskeletal:      Lumbar back: Negative right straight leg raise test and negative left straight leg raise test       Comments: Right sciatic notch tenderness, no decreased rom, no spinal tenderness, negative SLR bilaterally   Skin:     General: Skin is warm and dry  Capillary Refill: Capillary refill takes less than 2 seconds  Findings: No rash  Neurological:      Mental Status: She is alert

## 2022-09-06 ENCOUNTER — HOSPITAL ENCOUNTER (OUTPATIENT)
Dept: RADIOLOGY | Facility: HOSPITAL | Age: 62
Discharge: HOME | End: 2022-09-06
Payer: COMMERCIAL

## 2022-09-06 DIAGNOSIS — M54.41 ACUTE RIGHT-SIDED LOW BACK PAIN WITH RIGHT-SIDED SCIATICA: ICD-10-CM

## 2022-09-06 PROCEDURE — 72110 X-RAY EXAM L-2 SPINE 4/>VWS: CPT

## 2022-09-09 DIAGNOSIS — M51.37 DEGENERATIVE DISC DISEASE AT L5-S1 LEVEL: ICD-10-CM

## 2022-09-09 DIAGNOSIS — M41.86 DEXTROSCOLIOSIS OF LUMBAR SPINE: ICD-10-CM

## 2022-09-09 DIAGNOSIS — M54.41 ACUTE RIGHT-SIDED LOW BACK PAIN WITH RIGHT-SIDED SCIATICA: Primary | ICD-10-CM

## 2022-09-09 PROBLEM — M51.379 DEGENERATIVE DISC DISEASE AT L5-S1 LEVEL: Status: ACTIVE | Noted: 2022-09-09

## 2022-09-09 NOTE — RESULT ENCOUNTER NOTE
Your xray showed moderate arthritis in lumbar spine and scoliosis  I ordered an MRI to further evaluate for the right leg nerve pain  It was approved   She can call to schedule

## 2022-09-12 ENCOUNTER — TELEPHONE (OUTPATIENT)
Dept: FAMILY MEDICINE CLINIC | Facility: CLINIC | Age: 62
End: 2022-09-12

## 2022-09-12 NOTE — TELEPHONE ENCOUNTER
----- Message from Castillo Barraza, [address] sent at 9/9/2022  3:17 PM EDT -----  Your xray showed moderate arthritis in lumbar spine and scoliosis  I ordered an MRI to further evaluate for the right leg nerve pain  It was approved   She can call to schedule

## 2022-10-18 ENCOUNTER — HOSPITAL ENCOUNTER (OUTPATIENT)
Dept: MRI IMAGING | Facility: HOSPITAL | Age: 62
Discharge: HOME | End: 2022-10-18
Payer: COMMERCIAL

## 2022-10-18 DIAGNOSIS — M51.37 DEGENERATIVE DISC DISEASE AT L5-S1 LEVEL: ICD-10-CM

## 2022-10-18 DIAGNOSIS — M54.41 ACUTE RIGHT-SIDED LOW BACK PAIN WITH RIGHT-SIDED SCIATICA: ICD-10-CM

## 2022-10-18 DIAGNOSIS — M41.86 DEXTROSCOLIOSIS OF LUMBAR SPINE: ICD-10-CM

## 2022-10-18 PROCEDURE — G1004 CDSM NDSC: HCPCS

## 2022-10-18 PROCEDURE — 72148 MRI LUMBAR SPINE W/O DYE: CPT

## 2022-10-25 ENCOUNTER — OFFICE VISIT (OUTPATIENT)
Dept: FAMILY MEDICINE CLINIC | Facility: CLINIC | Age: 62
End: 2022-10-25

## 2022-10-25 VITALS
DIASTOLIC BLOOD PRESSURE: 74 MMHG | TEMPERATURE: 97.6 F | SYSTOLIC BLOOD PRESSURE: 118 MMHG | WEIGHT: 170 LBS | OXYGEN SATURATION: 97 % | BODY MASS INDEX: 25.76 KG/M2 | HEART RATE: 107 BPM | HEIGHT: 68 IN

## 2022-10-25 DIAGNOSIS — M54.41 ACUTE RIGHT-SIDED LOW BACK PAIN WITH RIGHT-SIDED SCIATICA: Primary | ICD-10-CM

## 2022-10-25 DIAGNOSIS — Z78.0 POST-MENOPAUSAL: ICD-10-CM

## 2022-10-25 DIAGNOSIS — M51.37 DEGENERATIVE DISC DISEASE AT L5-S1 LEVEL: ICD-10-CM

## 2022-10-25 DIAGNOSIS — M85.852 OSTEOPENIA OF NECK OF LEFT FEMUR: ICD-10-CM

## 2022-10-25 NOTE — PROGRESS NOTES
Name: Jalyn Villeda      : 1960      MRN: [id number]  Encounter Provider: LA Pantoja  Encounter Date: 10/25/2022   Encounter department: [facility]     1  Acute right-sided low back pain with right-sided sciatica  Assessment & Plan:  Referral pain management-interventional     Orders:  -     Ambulatory Referral to Pain Management; Future    2  Degenerative disc disease at L5-S1 level  -     Ambulatory Referral to Pain Management; Future    3  Post-menopausal  Assessment & Plan:  Check DXA  Calcium and vitamin D- weight bearing exercises    Orders:  -     DXA bone density spine hip and pelvis; Future; Expected date: 10/25/2022    4  Osteopenia of neck of left femur  Assessment & Plan:  Check DXA  Calcium and vitamin D- weight bearing exercises    Orders:  -     DXA bone density spine hip and pelvis; Future; Expected date: 10/25/2022         Subjective      History of Right side buttock pain that radiates down into her right leg since January  Did PT- helped take edge off x 3 months  Yesterday worse shooting pain that comes and goes like a pulse  Started originally on the outer aspect of her leg, and now its more localized to inner right thigh  No loss of bowel or bladder function  Has tried ice or heat- doesn't notice an improvement with either  Sometimes has tingling sensation  Wakes her up at night time  Leg doesn't feel unstable underneath her  In evening will take tylenol and then naproxen other days  Uses Aspercreme as well with minimal effect, salon pas which is slightly helpful  Had MRI done and here today to review results and talk next steps  MRI showed:   L3-L4:  Minimal annular bulging bilaterally with slight loss of disc height  Mild facet degenerative change  No disc herniation  No cauda equina impingement or foraminal narrowing      L4-L5:  Slight disc desiccation without loss of disc height    Mild annular bulging with a small central disc protrusion  No canal stenosis  No foraminal nerve impingement      L5-S1:  Disc desiccation and loss of disc height  Annular bulging with mild endplate hypertrophic change  Small right foraminal disc protrusion  Mild foraminal endplate hypertrophic change bilaterally  There is no canal stenosis or mass effect upon   the thecal sac  Mild foraminal narrowing bilaterally  Here to review her back xray and lumbar MRI  Xray 9/2022 showed moderate arthritis in lumbar spine and scoliosis  MRI was ordered  MRI 10/18/2022 showed no scoliosis, fracture  Did show:    L3-L4 minimal annular bulging bilaterally with slight loss of disc height  Mild facet degenerative change  No disc herniation  L4-5 Slight disc desiccation without loss of disc heigh  Mild annular bulging with a small central disc protrusion- no canal stenosis no nerve impingement    L5-S1: Disc desiccation and loss of disc heigh  Annual bulging with mild endplate change  Small right foraminal disc protrustion  Mild foraminal narrowing bilaterally  No stenosis    Review of Systems   Constitutional: Negative  Respiratory: Negative  Cardiovascular: Negative  Gastrointestinal: Negative  Musculoskeletal: Positive for back pain  Neurological: Positive for numbness (tingling in right leg)  Negative for weakness  Current Outpatient Medications on File Prior to Visit   Medication Sig   • fluticasone (FLONASE) 50 mcg/act nasal spray 2 sprays into each nostril daily (Patient not taking: No sig reported)       Objective     /74   Pulse (!) 107   Temp 97 6 °F (36 4 °C) (Tympanic)   Ht 5' 8" (1 727 m)   Wt 77 1 kg (170 lb)   SpO2 97%   BMI 25 85 kg/m²     Physical Exam  Vitals and nursing note reviewed  Constitutional:       General: She is not in acute distress  Appearance: Normal appearance  She is not ill-appearing  Cardiovascular:      Rate and Rhythm: Regular rhythm        Heart sounds: Normal heart sounds  Pulmonary:      Effort: Pulmonary effort is normal       Breath sounds: Normal breath sounds  Abdominal:      Palpations: Abdomen is soft  Musculoskeletal:      Lumbar back: Negative right straight leg raise test and negative left straight leg raise test       Comments: Right sciatic notch tenderness, no decreased rom, no spinal tenderness, negative SLR bilaterally   Skin:     General: Skin is warm and dry  Capillary Refill: Capillary refill takes less than 2 seconds  Findings: No rash  Neurological:      General: No focal deficit present  Mental Status: She is alert and oriented to person, place, and time  Motor: No weakness  Gait: Gait normal       Deep Tendon Reflexes: Reflexes normal    Psychiatric:         Mood and Affect: Mood normal          Behavior: Behavior normal          Thought Content:  Thought content normal          Judgment: Judgment normal        LA Vallecillo

## 2022-10-29 PROBLEM — Z78.0 POST-MENOPAUSAL: Status: ACTIVE | Noted: 2022-10-29

## 2022-10-29 PROBLEM — M85.852 OSTEOPENIA OF NECK OF LEFT FEMUR: Status: ACTIVE | Noted: 2022-10-29

## 2022-11-09 ENCOUNTER — LAB (OUTPATIENT)
Dept: LAB | Facility: HOSPITAL | Age: 62
End: 2022-11-09

## 2022-11-09 ENCOUNTER — RA CDI HCC (OUTPATIENT)
Dept: OTHER | Facility: HOSPITAL | Age: 62
End: 2022-11-09

## 2022-11-09 ENCOUNTER — CONSULT (OUTPATIENT)
Dept: PAIN MEDICINE | Facility: CLINIC | Age: 62
End: 2022-11-09

## 2022-11-09 ENCOUNTER — APPOINTMENT (OUTPATIENT)
Dept: RADIOLOGY | Facility: CLINIC | Age: 62
End: 2022-11-09

## 2022-11-09 VITALS
HEIGHT: 68 IN | HEART RATE: 100 BPM | WEIGHT: 174 LBS | SYSTOLIC BLOOD PRESSURE: 138 MMHG | DIASTOLIC BLOOD PRESSURE: 82 MMHG | TEMPERATURE: 98.2 F | BODY MASS INDEX: 26.37 KG/M2

## 2022-11-09 DIAGNOSIS — M54.41 ACUTE RIGHT-SIDED LOW BACK PAIN WITH RIGHT-SIDED SCIATICA: ICD-10-CM

## 2022-11-09 DIAGNOSIS — M51.37 DEGENERATIVE DISC DISEASE AT L5-S1 LEVEL: ICD-10-CM

## 2022-11-09 DIAGNOSIS — M25.552 LEFT HIP PAIN: ICD-10-CM

## 2022-11-09 DIAGNOSIS — Z00.00 ROUTINE GENERAL MEDICAL EXAMINATION AT A HEALTH CARE FACILITY: ICD-10-CM

## 2022-11-09 DIAGNOSIS — M51.26 PROTRUSION OF LUMBAR INTERVERTEBRAL DISC: Primary | ICD-10-CM

## 2022-11-09 DIAGNOSIS — M54.16 RIGHT LUMBAR RADICULOPATHY: ICD-10-CM

## 2022-11-09 LAB
ALBUMIN SERPL BCP-MCNC: 3.9 G/DL (ref 3.5–5)
ALP SERPL-CCNC: 97 U/L (ref 46–116)
ALT SERPL W P-5'-P-CCNC: 21 U/L (ref 12–78)
ANION GAP SERPL CALCULATED.3IONS-SCNC: 3 MMOL/L (ref 4–13)
AST SERPL W P-5'-P-CCNC: 12 U/L (ref 5–45)
BASOPHILS # BLD AUTO: 0.03 THOUSANDS/ÂΜL (ref 0–0.1)
BASOPHILS NFR BLD AUTO: 1 % (ref 0–1)
BILIRUB SERPL-MCNC: 0.89 MG/DL (ref 0.2–1)
BUN SERPL-MCNC: 16 MG/DL (ref 5–25)
CALCIUM SERPL-MCNC: 9.2 MG/DL (ref 8.3–10.1)
CHLORIDE SERPL-SCNC: 109 MMOL/L (ref 96–108)
CHOLEST SERPL-MCNC: 166 MG/DL
CO2 SERPL-SCNC: 29 MMOL/L (ref 21–32)
CREAT SERPL-MCNC: 0.75 MG/DL (ref 0.6–1.3)
EOSINOPHIL # BLD AUTO: 0.09 THOUSAND/ÂΜL (ref 0–0.61)
EOSINOPHIL NFR BLD AUTO: 2 % (ref 0–6)
ERYTHROCYTE [DISTWIDTH] IN BLOOD BY AUTOMATED COUNT: 11.7 % (ref 11.6–15.1)
GFR SERPL CREATININE-BSD FRML MDRD: 84 ML/MIN/1.73SQ M
GLUCOSE P FAST SERPL-MCNC: 107 MG/DL (ref 65–99)
HCT VFR BLD AUTO: 44.8 % (ref 34.8–46.1)
HDLC SERPL-MCNC: 43 MG/DL
HGB BLD-MCNC: 14.9 G/DL (ref 11.5–15.4)
IMM GRANULOCYTES # BLD AUTO: 0.01 THOUSAND/UL (ref 0–0.2)
IMM GRANULOCYTES NFR BLD AUTO: 0 % (ref 0–2)
LDLC SERPL CALC-MCNC: 103 MG/DL (ref 0–100)
LYMPHOCYTES # BLD AUTO: 0.91 THOUSANDS/ÂΜL (ref 0.6–4.47)
LYMPHOCYTES NFR BLD AUTO: 21 % (ref 14–44)
MCH RBC QN AUTO: 30.7 PG (ref 26.8–34.3)
MCHC RBC AUTO-ENTMCNC: 33.3 G/DL (ref 31.4–37.4)
MCV RBC AUTO: 92 FL (ref 82–98)
MONOCYTES # BLD AUTO: 0.32 THOUSAND/ÂΜL (ref 0.17–1.22)
MONOCYTES NFR BLD AUTO: 8 % (ref 4–12)
NEUTROPHILS # BLD AUTO: 2.92 THOUSANDS/ÂΜL (ref 1.85–7.62)
NEUTS SEG NFR BLD AUTO: 68 % (ref 43–75)
NONHDLC SERPL-MCNC: 123 MG/DL
NRBC BLD AUTO-RTO: 0 /100 WBCS
PLATELET # BLD AUTO: 213 THOUSANDS/UL (ref 149–390)
PMV BLD AUTO: 10.2 FL (ref 8.9–12.7)
POTASSIUM SERPL-SCNC: 4.4 MMOL/L (ref 3.5–5.3)
PROT SERPL-MCNC: 7.2 G/DL (ref 6.4–8.4)
RBC # BLD AUTO: 4.86 MILLION/UL (ref 3.81–5.12)
SODIUM SERPL-SCNC: 141 MMOL/L (ref 135–147)
TRIGL SERPL-MCNC: 101 MG/DL
TSH SERPL DL<=0.05 MIU/L-ACNC: 2.14 UIU/ML (ref 0.45–4.5)
WBC # BLD AUTO: 4.28 THOUSAND/UL (ref 4.31–10.16)

## 2022-11-09 NOTE — PROGRESS NOTES
Assessment  1  Protrusion of lumbar intervertebral disc    2  Acute right-sided low back pain with right-sided sciatica    3  Degenerative disc disease at L5-S1 level    4  Right lumbar radiculopathy    5  Left hip pain        Plan    Pleasant 38-year-old female with right radicular symptoms  I thoroughly reviewed the MRI results and images with the patient  She does have disc degeneration believe her disc protrusion to the right corresponds to her symptoms  Reba's pain persists despite time, relative rest, activity modification and therapy  I believe that she may benefit from a lumbar epidural steroid injection to diminish any inflammatory component of her pain  I will initially use a transforaminal approach to better concentrate the steroid along the affected nerve root  If the patient does not receive significant pain relief following the initial injection, I may need to repeat using a translaminar approach  In the office today, we reviewed the nature of the patient's pathology in depth using  diagrams and models  I discussed the approach I would use for the epidural steroid injection and provided literature for home review  The patient understands the risks associated with the procedure including but not limited to bleeding, infection, tissue injury, decreased immunity secondary to steroid injection, exacerbation of symptoms, allergic reaction, spinal headache, and paralysis and provided verbal consent  In regards to her left hip pain I believe mostly secondary to compensation with a possible iliopsoas strain however will obtain radiographs trial any other significant pathology  My impressions and treatment recommendations were discussed in detail with the patient who verbalized understanding and had no further questions  Discharge instructions were provided  I personally saw and examined the patient and I agree with the above discussed plan of care    This note is created using dictation transcription  It may contain typographical errors, grammatical errors, improperly dictated words, background noise and other errors  Orders Placed This Encounter   Procedures   • FL spine and pain procedure     Standing Status:   Future     Standing Expiration Date:   11/9/2026     Order Specific Question:   Reason for Exam:     Answer:   right L5 and S1 TFESI 1     Order Specific Question:   Anticoagulant hold needed? Answer:   no   • FL spine and pain procedure     Standing Status:   Future     Standing Expiration Date:   11/9/2026     Order Specific Question:   Reason for Exam:     Answer:   right L5 adn S1 TFESi 2     Order Specific Question:   Anticoagulant hold needed? Answer:   no   • XR hip/pelv 2-3 vws left if performed     Standing Status:   Future     Standing Expiration Date:   11/9/2026     Scheduling Instructions:      Bring along any outside films relating to this procedure  Referred By: LA Lee  History of Present Illness    Diane Hernandez is a 57 y o  female with a 11 month history of right-sided low back and lower extremity pain  She is unaware of any clear precipitating event denies any trauma or injury  She also reports some occasional left-sided hip pain  Her pain is moderate to severe rates as 7/10 on the visual analog scale interfering with daily living activities  Is nearly constant worse in the afternoon describes burning shooting sharp down the posterolateral aspect of her right leg  Lying down standing bending sitting and walking decreases symptoms while sitting aggravates her pain  Physical therapy minimal relief exercise with minimal relief  Has tried nonsteroidal anti-inflammatories denies any bowel or bladder dysfunction  I have personally reviewed and/or updated the patient's past medical history, past surgical history, family history, social history, current medications, allergies, and vital signs today       Review of Systems Constitutional: Negative for fever and unexpected weight change  HENT: Negative for trouble swallowing  Eyes: Negative for visual disturbance  Respiratory: Negative for shortness of breath and wheezing  Cardiovascular: Negative for chest pain and palpitations  Gastrointestinal: Negative for constipation, diarrhea, nausea and vomiting  Endocrine: Negative for cold intolerance, heat intolerance and polydipsia  Genitourinary: Negative for difficulty urinating and frequency  Musculoskeletal: Negative for arthralgias, gait problem, joint swelling and myalgias  Skin: Negative for rash  Neurological: Negative for dizziness, seizures, syncope, weakness and headaches  Hematological: Does not bruise/bleed easily  Psychiatric/Behavioral: Negative for dysphoric mood  All other systems reviewed and are negative        Patient Active Problem List   Diagnosis   • Lipoma of torso   • Low bone density for age   • Acute right-sided low back pain with right-sided sciatica   • Dextroscoliosis of lumbar spine   • Degenerative disc disease at L5-S1 level   • Osteopenia of neck of left femur   • Post-menopausal       Past Medical History:   Diagnosis Date   • No known problems        Past Surgical History:   Procedure Laterality Date   • HYSTERECTOMY      Resolved 7/2003   • OOPHORECTOMY Left     with hysterectomy       Family History   Problem Relation Age of Onset   • Diabetes Mother         Mellitus   • Lymphoma Mother    • Diabetes Maternal Grandmother         Mellitus   • No Known Problems Father    • No Known Problems Sister    • No Known Problems Daughter    • No Known Problems Maternal Grandfather    • No Known Problems Paternal Grandmother    • No Known Problems Paternal Grandfather    • No Known Problems Maternal Aunt    • No Known Problems Maternal Aunt        Social History     Occupational History   • Not on file   Tobacco Use   • Smoking status: Never Smoker   • Smokeless tobacco: Never Used Vaping Use   • Vaping Use: Never used   Substance and Sexual Activity   • Alcohol use: Yes     Comment: Social   • Drug use: No   • Sexual activity: Not on file       Current Outpatient Medications on File Prior to Visit   Medication Sig   • fluticasone (FLONASE) 50 mcg/act nasal spray 2 sprays into each nostril daily (Patient not taking: No sig reported)     No current facility-administered medications on file prior to visit  Allergies   Allergen Reactions   • Pollen Extract    • Sulfa Antibiotics GI Intolerance       Physical Exam    /82 (BP Location: Left arm, Patient Position: Standing, Cuff Size: Standard)   Pulse 100   Temp 98 2 °F (36 8 °C)   Ht 5' 8" (1 727 m)   Wt 78 9 kg (174 lb)   BMI 26 46 kg/m²     Constitutional: normal, well developed, well nourished, alert, in no distress and non-toxic and no overt pain behavior  Eyes: anicteric  HEENT: grossly intact  Neck: supple, symmetric, trachea midline and no masses   Pulmonary:even and unlabored  Cardiovascular:No edema or pitting edema present  Skin:Normal without rashes or lesions and well hydrated  Psychiatric:Mood and affect appropriate  Neurologic:Cranial Nerves II-XII grossly intact  Musculoskeletal:normal, Difficulty going from sitting to standing sitting position; no obvious skin lesions or erythema lumbar sacral spine; mild tenderness in lumbar paravertebrals, no sacroiliac or greater trochanteric tenderness bilateral; deep tendon reflexes are diminished but symmetrical bilateral patellar and achilles; no focal motor deficit appreciated lower limbs; negative bilateral straight leg raising  Imaging  MRI LUMBAR SPINE WITHOUT CONTRAST @  10-18-22     INDICATION: M54 41: Lumbago with sciatica, right side  M41 86: Other forms of scoliosis, lumbar region  M51 37:  Other intervertebral disc degeneration, lumbosacral region      COMPARISON:  X-rays dated 9/6/2022     TECHNIQUE:  Sagittal T1, sagittal T2, sagittal inversion recovery, axial T1 and axial T2, coronal T2     IMAGE QUALITY:  Diagnostic     FINDINGS:     VERTEBRAL BODIES:  There are 5 lumbar type vertebral bodies  Normal alignment of the lumbar spine  No spondylolysis or spondylolisthesis  No scoliosis  No compression fracture  Normal marrow signal is identified within the visualized bony   structures  No discrete marrow lesion      SACRUM:  Normal signal within the sacrum  No evidence of insufficiency or stress fracture      DISTAL CORD AND CONUS:  Normal size and signal within the distal cord and conus      PARASPINAL SOFT TISSUES:  Small T2 hyperintensities are seen within the liver and kidneys most likely representing small cysts  The liver is enlarged particularly the right lobe which extends inferiorly into the abdomen      LOWER THORACIC DISC SPACES:  Normal disc height and signal   No disc herniation, canal stenosis or foraminal narrowing      LUMBAR DISC SPACES:     L1-L2:  Normal      L2-L3:  Normal      L3-L4:  Minimal annular bulging bilaterally with slight loss of disc height  Mild facet degenerative change  No disc herniation  No cauda equina impingement or foraminal narrowing      L4-L5:  Slight disc desiccation without loss of disc height  Mild annular bulging with a small central disc protrusion  No canal stenosis  No foraminal nerve impingement      L5-S1:  Disc desiccation and loss of disc height  Annular bulging with mild endplate hypertrophic change  Small right foraminal disc protrusion  Mild foraminal endplate hypertrophic change bilaterally  There is no canal stenosis or mass effect upon   the thecal sac  Mild foraminal narrowing bilaterally      IMPRESSION:     Lumbar degenerative disc disease L3-4, L4-5 and L5-S1 as described above  Mild bilateral foraminal narrowing at L5-S1 as a result of disc and endplate changes      LUMBAR SPINE @  9-6-22     INDICATION:   M54 41: Lumbago with sciatica, right side      COMPARISON:  None     VIEWS: XR SPINE LUMBAR MINIMUM 4 VIEWS NON INJURY  Images: 5     FINDINGS:     There are 5 non rib bearing lumbar vertebral bodies       There is no evidence of acute fracture or destructive osseous lesion      Mild dextroscoliosis, apex L2-3     Moderate degenerative disc disease L5-S1     The pedicles appear intact      Soft tissues are unremarkable      IMPRESSION:     Moderate degenerative disc disease L5-S1   Dextroscoliosis     No acute lumbar spinal abnormalities identified    I have personally reviewed pertinent films in PACS and my interpretation is Multilevel lumbar spinal   Spondylosis

## 2022-11-09 NOTE — PROGRESS NOTES
[facility] 75  coding opportunities       Chart reviewed, no opportunity found: CHART REVIEWED, NO OPPORTUNITY FOUND        Patients Insurance        Commercial Insurance: [employer]

## 2022-11-14 ENCOUNTER — HOSPITAL ENCOUNTER (OUTPATIENT)
Dept: RADIOLOGY | Facility: CLINIC | Age: 62
Discharge: HOME | End: 2022-11-14

## 2022-11-14 VITALS
SYSTOLIC BLOOD PRESSURE: 127 MMHG | OXYGEN SATURATION: 94 % | HEART RATE: 88 BPM | TEMPERATURE: 97.5 F | RESPIRATION RATE: 18 BRPM | DIASTOLIC BLOOD PRESSURE: 86 MMHG

## 2022-11-14 DIAGNOSIS — M54.16 RIGHT LUMBAR RADICULOPATHY: ICD-10-CM

## 2022-11-14 DIAGNOSIS — M51.26 PROTRUSION OF LUMBAR INTERVERTEBRAL DISC: ICD-10-CM

## 2022-11-14 RX ORDER — METHYLPREDNISOLONE ACETATE 80 MG/ML
160 INJECTION, SUSPENSION INTRA-ARTICULAR; INTRALESIONAL; INTRAMUSCULAR; PARENTERAL; SOFT TISSUE ONCE
Status: COMPLETED | OUTPATIENT
Start: 2022-11-14 | End: 2022-11-14

## 2022-11-14 RX ADMIN — IOHEXOL 2 ML: 300 INJECTION, SOLUTION INTRAVENOUS at 13:12

## 2022-11-14 RX ADMIN — METHYLPREDNISOLONE ACETATE 160 MG: 80 INJECTION, SUSPENSION INTRA-ARTICULAR; INTRALESIONAL; INTRAMUSCULAR; SOFT TISSUE at 13:12

## 2022-11-14 NOTE — DISCHARGE INSTRUCTIONS
Epidural Steroid Injection   WHAT YOU NEED TO KNOW:   An epidural steroid injection (LIANNA) is a procedure to inject steroid medicine into the epidural space  The epidural space is between your spinal cord and vertebrae  Steroids reduce inflammation and fluid buildup in your spine that may be causing pain  You may be given pain medicine along with the steroids  ACTIVITY  Do not drive or operate machinery today  No strenuous activity today - bending, lifting, etc   You may resume normal activites starting tomorrow - start slowly and as tolerated  You may shower today, but no tub baths or hot tubs  You may have numbness for several hours from the local anesthetic  Please use caution and common sense, especially with weight-bearing activities  CARE OF THE INJECTION SITE  If you have soreness or pain, apply ice to the area today (20 minutes on/20 minutes off)  Starting tomorrow, you may use warm, moist heat or ice if needed  You may have an increase or change in your discomfort for 36-48 hours after your treatment  Apply ice and continue with any pain medication you have been prescribed  Notify the [facility] if you have any of the following: redness, drainage, swelling, headache, stiff neck or fever above 100°F     SPECIAL INSTRUCTIONS  Our office will contact you in approximately 7 days for a progress report  MEDICATIONS  Continue to take all routine medications  Our office may have instructed you to hold some medications  As no general anesthesia was used in today's procedure, you should not experience any side effects related to anesthesia  If you are diabetic, the steroids used in today's injection may temporarily increase your blood sugar levels after the first few days after your injection  Please keep a close eye on your sugars and alert the doctor who manages your diabetes if your sugars are significantly high from your baseline or you are symptomatic       If you have a problem specifically related to your procedure, please call our office at [phone]  Problems not related to your procedure should be directed to your primary care physician

## 2022-11-14 NOTE — H&P
History of Present Illness: The patient is a 57 y o  female who presents with complaints of low back and leg pain  Past Medical History:   Diagnosis Date   • No known problems        Past Surgical History:   Procedure Laterality Date   • HYSTERECTOMY      Resolved 7/2003   • OOPHORECTOMY Left     with hysterectomy         Current Outpatient Medications:   •  fluticasone (FLONASE) 50 mcg/act nasal spray, 2 sprays into each nostril daily (Patient not taking: No sig reported), Disp: 9 9 mL, Rfl: 3    Current Facility-Administered Medications:   •  iohexol (OMNIPAQUE) 300 mg/mL injection 50 mL, 50 mL, Epidural, Once, Juan Padron DO  •  methylPREDNISolone acetate (DEPO-MEDROL) injection 160 mg, 160 mg, Epidural, Once, Juan Padron DO    Allergies   Allergen Reactions   • Pollen Extract    • Sulfa Antibiotics GI Intolerance       Physical Exam:   General: Awake, Alert, Oriented x 3, Mood and affect appropriate  Respiratory: Respirations even and unlabored  Cardiovascular: Peripheral pulses intact; no edema  Musculoskeletal Exam:  Decreased range of motion lumbar spine    ASA Score:  II         Assessment:   1  Protrusion of lumbar intervertebral disc    2   Right lumbar radiculopathy        Plan: right L5 and S1 TFESI 1

## 2022-11-21 ENCOUNTER — TELEPHONE (OUTPATIENT)
Dept: PAIN MEDICINE | Facility: CLINIC | Age: 62
End: 2022-11-21

## 2022-11-21 NOTE — TELEPHONE ENCOUNTER
1st attempt  Lm to cb with % improvement and pain level.     right L5 and S1 TFESI 11/14, 12/5 TFESI #2

## 2022-11-22 NOTE — TELEPHONE ENCOUNTER
Caller: Pt    Doctor: Dr. Ocasio    Reason for call: Pt called in with 85 % improvement and  3/10 pain level.       Call back#: [phone]

## 2022-12-02 ENCOUNTER — OFFICE VISIT (OUTPATIENT)
Dept: FAMILY MEDICINE CLINIC | Facility: CLINIC | Age: 62
End: 2022-12-02

## 2022-12-02 VITALS
TEMPERATURE: 98.2 F | DIASTOLIC BLOOD PRESSURE: 68 MMHG | BODY MASS INDEX: 25.76 KG/M2 | HEIGHT: 68 IN | WEIGHT: 170 LBS | SYSTOLIC BLOOD PRESSURE: 118 MMHG | HEART RATE: 78 BPM | OXYGEN SATURATION: 97 %

## 2022-12-02 DIAGNOSIS — Z23 NEED FOR INFLUENZA VACCINATION: ICD-10-CM

## 2022-12-02 DIAGNOSIS — H93.13 TINNITUS OF BOTH EARS: ICD-10-CM

## 2022-12-02 DIAGNOSIS — M85.852 OSTEOPENIA OF NECK OF LEFT FEMUR: ICD-10-CM

## 2022-12-02 DIAGNOSIS — M51.37 DEGENERATIVE DISC DISEASE AT L5-S1 LEVEL: ICD-10-CM

## 2022-12-02 DIAGNOSIS — M54.16 RIGHT LUMBAR RADICULOPATHY: ICD-10-CM

## 2022-12-02 DIAGNOSIS — Z78.0 POST-MENOPAUSAL: ICD-10-CM

## 2022-12-02 DIAGNOSIS — Z00.00 ANNUAL PHYSICAL EXAM: Primary | ICD-10-CM

## 2022-12-02 NOTE — PROGRESS NOTES
[address]    NAME: Job Ocasio  AGE: 57 y o  SEX: female  : 1960     DATE: 2022     Assessment and Plan:     Problem List Items Addressed This Visit        Nervous and Auditory    Right lumbar radiculopathy     Improved, following with pain management            Musculoskeletal and Integument    Degenerative disc disease at L5-S1 level    Osteopenia of neck of left femur     Check dxa, continue vitamin D and calcium and walking            Other    Post-menopausal    Tinnitus of both ears     ENT referral         Relevant Orders    Ambulatory Referral to Otolaryngology   Other Visit Diagnoses     Annual physical exam    -  Primary    Need for influenza vaccination        Relevant Orders    influenza vaccine, quadrivalent, recombinant, PF, 0 5 mL, for patients 18 yr+ (FLUBLOK) (Completed)          Immunizations and preventive care screenings were discussed with patient today  Appropriate education was printed on patient's after visit summary  Counseling:  Alcohol/drug use: discussed moderation in alcohol intake, the recommendations for healthy alcohol use, and avoidance of illicit drug use  Dental Health: discussed importance of regular tooth brushing, flossing, and dental visits  Injury prevention: discussed safety/seat belts, safety helmets, smoke detectors, carbon dioxide detectors, and smoking near bedding or upholstery  Sexual health: discussed sexually transmitted diseases, partner selection, use of condoms, avoidance of unintended pregnancy, and contraceptive alternatives  · Exercise: the importance of regular exercise/physical activity was discussed  Recommend exercise 3-5 times per week for at least 30 minutes  No follow-ups on file       Chief Complaint:     Chief Complaint   Patient presents with   • Physical Exam      History of Present Illness:     Adult Annual Physical   Patient here for a comprehensive physical exam  The patient reports no problems  Saw pain management- had lumbar injection with almost 95% improvement in symptoms  She has another appointment next week, unsure if she will do it since she has had such improvement  Reviewed labs from this month  Mammogram scheduled for January 2023  DEXA scan is scheduled this month  Pap due 11/2023        Diet and Physical Activity  · Diet/Nutrition: well balanced diet  · Exercise: walking, 3-4 times a week on average and has decreased slightly in activity level due to weather  Depression Screening  PHQ-2/9 Depression Screening    Little interest or pleasure in doing things: 0 - not at all  Feeling down, depressed, or hopeless: 0 - not at all  PHQ-2 Score: 0  PHQ-2 Interpretation: Negative depression screen       General Health  · Sleep: sleeps well  · Hearing: normal - bilateral   · Vision: goes for regular eye exams and wears glasses  · Dental: regular dental visits and brushes teeth twice daily  /GYN Health  · Patient is: postmenopausal       Review of Systems:     Review of Systems   Constitutional: Negative  Negative for chills and fever  HENT: Negative  Negative for ear pain and sore throat  Eyes: Negative  Negative for pain and visual disturbance  Respiratory: Negative  Negative for cough and shortness of breath  Cardiovascular: Negative for chest pain and palpitations  Gastrointestinal: Negative  Negative for abdominal pain and vomiting  Genitourinary: Negative  Negative for dysuria and hematuria  Musculoskeletal: Positive for back pain  Negative for arthralgias  Skin: Negative for color change, pallor and rash  Neurological: Negative  Negative for seizures and syncope  Psychiatric/Behavioral: Negative  All other systems reviewed and are negative       Past Medical History:     Past Medical History:   Diagnosis Date   • No known problems       Past Surgical History:     Past Surgical History:   Procedure Laterality Date   • HYSTERECTOMY      Resolved 7/2003   • OOPHORECTOMY Left     with hysterectomy      Social History:     Social History     Socioeconomic History   • Marital status: /Civil Union     Spouse name: None   • Number of children: None   • Years of education: None   • Highest education level: None   Occupational History   • None   Tobacco Use   • Smoking status: Never   • Smokeless tobacco: Never   Vaping Use   • Vaping Use: Never used   Substance and Sexual Activity   • Alcohol use: Yes     Comment: Social   • Drug use: No   • Sexual activity: None   Other Topics Concern   • None   Social History Narrative   • None     Social Determinants of Health     Financial Resource Strain: Not on file   Food Insecurity: Not on file   Transportation Needs: Not on file   Physical Activity: Not on file   Stress: Not on file   Social Connections: Not on file   Intimate Partner Violence: Not on file   Housing Stability: Not on file      Family History:     Family History   Problem Relation Age of Onset   • Diabetes Mother         Mellitus   • Lymphoma Mother    • Diabetes Maternal Grandmother         Mellitus   • No Known Problems Father    • No Known Problems Sister    • No Known Problems Daughter    • No Known Problems Maternal Grandfather    • No Known Problems Paternal Grandmother    • No Known Problems Paternal Grandfather    • No Known Problems Maternal Aunt    • No Known Problems Maternal Aunt       Current Medications:     Current Outpatient Medications   Medication Sig Dispense Refill   • fluticasone (FLONASE) 50 mcg/act nasal spray 2 sprays into each nostril daily (Patient not taking: Reported on 8/30/2022) 9 9 mL 3     No current facility-administered medications for this visit  Allergies:      Allergies   Allergen Reactions   • Pollen Extract    • Sulfa Antibiotics GI Intolerance      Physical Exam:     /68   Pulse 78   Temp 98 2 °F (36 8 °C) (Tympanic)   Ht 5' 8" (1 727 m)   Wt 77 1 kg (170 lb)   SpO2 97%   BMI 25 85 kg/m²     Physical Exam  Vitals and nursing note reviewed  Constitutional:       General: She is not in acute distress  Appearance: Normal appearance  She is well-developed  HENT:      Head: Normocephalic and atraumatic  Right Ear: Tympanic membrane, ear canal and external ear normal       Left Ear: Tympanic membrane, ear canal and external ear normal       Nose: Nose normal       Mouth/Throat:      Mouth: Mucous membranes are moist       Pharynx: Oropharynx is clear  Eyes:      Conjunctiva/sclera: Conjunctivae normal       Pupils: Pupils are equal, round, and reactive to light  Cardiovascular:      Rate and Rhythm: Normal rate and regular rhythm  Heart sounds: Normal heart sounds  No murmur heard  Pulmonary:      Effort: Pulmonary effort is normal  No respiratory distress  Breath sounds: Normal breath sounds  Abdominal:      General: Abdomen is flat  Bowel sounds are normal       Palpations: Abdomen is soft  Tenderness: There is no abdominal tenderness  Musculoskeletal:         General: No swelling  Cervical back: Neck supple  No tenderness  Right lower leg: No edema  Left lower leg: No edema  Lymphadenopathy:      Cervical: No cervical adenopathy  Skin:     General: Skin is warm and dry  Capillary Refill: Capillary refill takes less than 2 seconds  Neurological:      Mental Status: She is alert and oriented to person, place, and time     Psychiatric:         Mood and Affect: Mood normal          Behavior: Behavior normal           Brina Rebollar, [address] [facility] [Structural Heart and Valve Disease] : structural heart and valve disease [Hyperlipidemia] : hyperlipidemia [Hypertension] : hypertension [FreeTextEntry1] : 48-year-old female with a past medical history significant for\par \par Coronary artery disease s/p NSTEMI/PCI (mLAD) - 10/11/2021\par Type 2 diabetes mellitus (hemoglobin A1c 11.4%, Metformin)\par Varicose veins of bilateral lower extremities\par Cholecystectomy\par Stab phlebectomy\par Tubal ligation bilaterally\par Pre-menopause\par \par Since she was in the hospital she is doing better.  Has had initially a few episodes when she felt that something was sticking her that occurred a few days after the procedure.  When she ambulates no chest pain/tightness/discomfort, light-headed sensation, dizziness or palpations.  No blood in her stool or urine.  No fevers, chills or sweats.  Prior to the episodes she had severe anxiety with chest pain that has now resolved when she gets anxious.  Ms. Phoenix ambulates 2.5 miles in the morning.  Notes significant changes in her diet.  Has a decrease in appetite and has cut portion sizes.  Eating more vegetables.  Cutting out fried foods.   \par ================\par 1/13/2022\par Twice the patient was in the ER for severe blood loss for which she was anemic.  She was last in the ER the day after [place].  The patient has large fibroids.  She is getting injections by her gynecologist.  The patient was transitioned from Brillinta to clopidogrel 75mg daily.  Ms. Phoenix had her blood work checked on 12/27/2021.\par ==============\par 2/24/2022\par The patient reports that she feels okay, sometimes.  Suffers from anxiety.  No chest pain/tightness/discomfort, shortness of breath, lower extremity swelling, abdominal pain, fevers or chills.  The patient is able to walk unlimited distances.  The patient has not had any bleeding issues for over a month.  When she develops severe bleeding feels light-headed and dizzy.  No current formal exercise.  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    \par \par Gynecologist - Dr. 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  She presented on October 11, 2021 with a NSTEMI.  She underwent cardiac catheterization and found to have severe obstructive disease of the left anterior descending artery.  Middle left anterior descending artery drug-eluting stent was inserted.  The patient tolerated the procedure well.  No discomfort in her wrist.\par \par Overall she is now doing well with no cardiopulmonary complaints at rest or upon exertion.  Taking all her medications as prescribed.\par \par --UFE for secondary anemia hx requiring multiple transfusions 11 & 12/2021. The patient is an intermediate risk individual who is scheduled for a low to intermediate risk procedure.  She is not having any cardiopulmonary complaints at rest or upon exertion.  She is felt to be at an acceptable risk to have UFE with sedation from the anesthesia team. Recommend continue on aspirin 81mg daily and clopidogrel 75mg daily.\par --The patient was recently hospitalized with severe vaginal bleeding from anemia (2 hospitalizations - 2 days).  Each hospitalization she has been transfused 2U of PRBC.  She was treated with Aygestin 5mg x10 days \par --Recommend repeat TTE to assess EF/WMA.  Indications for study was reviewed with the patient.\par --If clinically doing well and EKG/TTE is stable in nature at 6 months anniversary of stent will consider discontinuation of clopidogrel.  Continue aspirin 81mg daily with aspirin.  Avoid all NSAIDs.  May take Tylenol for discomfort.\par --Reviewed laboratory work from 11/1/2021 with the patient.\par --She underwent MRI on 1/4/2022 for better evaluation of endometrium per last US read showing bulky uterus with asymmetric thickening of endometrium infiltrating into anterior myometrium and to rule out neoplasm.  Results are pending.\par --Status post drug-eluting stent/[place] on October 12, 2021.  Radial approach.  LVEDP 12 mmHg.  No aortic valve stenosis.  Normal left main with right dominant system.\par --Continue aspirin 81 mg daily.\par --Continue clopidogrel 75mg daily.  Signs and symptoms patient were reviewed.  Avoid all NSAIDs.  May take Tylenol for discomfort.  \par --Labs have been ordered to see if CBC is stable.\par --Continue metoprolol succinate 25 mg daily.\par --Continue atorvastatin 80 mg daily.\par --Recommend a repeat TTE in 6 months to 12 months.  Indications and details of study was gone over.\par --Continue Metformin and Ozempic.\par --EKG done do to history of coronary artery disease/hypertension/dyslipidemia.\par --The patient is morbidity obese (BMI).  Discussed strategies to lose weight and short and long term complications.\par --EKG performed do to history of coronary artery disease, hyperlipidemia and hypertension.\par --It was reviewed with the patient the importance of following a heart healthy lifestyle. AHA/ACC guidelines stress the importance of lifestyle modifications to lower cardiovascular disease risk. This includes eating a heart-healthy diet (fresh fruits/vegetable, whole grains, limit intake of sweets, sugar-sweetened beverages, red meats). He was encouraged to perform regular aerobic exercises at least 30 minutes of moderate intensity exercise at least 4 times a week and to maintain a desirable body weight and avoid tobacco products or second hand exposure.\par \par All questions and concerns of the patient were addressed.  Follow-up 1-2 weeks following his procedure.

## 2022-12-04 PROBLEM — H93.13 TINNITUS OF BOTH EARS: Status: ACTIVE | Noted: 2022-12-04

## 2022-12-05 ENCOUNTER — HOSPITAL ENCOUNTER (OUTPATIENT)
Dept: RADIOLOGY | Facility: CLINIC | Age: 62
Discharge: HOME | End: 2022-12-05

## 2022-12-08 ENCOUNTER — HOSPITAL ENCOUNTER (OUTPATIENT)
Dept: BONE DENSITY | Facility: IMAGING CENTER | Age: 62
Discharge: HOME | End: 2022-12-08

## 2022-12-08 VITALS — HEIGHT: 67 IN | BODY MASS INDEX: 26.53 KG/M2 | WEIGHT: 169 LBS

## 2022-12-08 DIAGNOSIS — M85.852 OSTEOPENIA OF NECK OF LEFT FEMUR: ICD-10-CM

## 2022-12-08 DIAGNOSIS — Z78.0 POST-MENOPAUSAL: ICD-10-CM

## 2022-12-12 NOTE — RESULT ENCOUNTER NOTE
Your bone density test came back showing osteopenia, it has decreased since 2016  Recommend weight bearing exercises and adequate calcium and vitamin D supplements  Calcium 600mg twice daily and Vitamin D 2000 iu daily   Recheck bone density test in 2 years

## 2023-01-05 ENCOUNTER — OFFICE VISIT (OUTPATIENT)
Dept: PAIN MEDICINE | Facility: CLINIC | Age: 63
End: 2023-01-05

## 2023-01-05 VITALS
WEIGHT: 169 LBS | SYSTOLIC BLOOD PRESSURE: 120 MMHG | HEIGHT: 67 IN | BODY MASS INDEX: 26.53 KG/M2 | HEART RATE: 88 BPM | DIASTOLIC BLOOD PRESSURE: 72 MMHG | TEMPERATURE: 97.8 F

## 2023-01-05 DIAGNOSIS — M51.37 DEGENERATIVE DISC DISEASE AT L5-S1 LEVEL: ICD-10-CM

## 2023-01-05 DIAGNOSIS — M54.41 ACUTE RIGHT-SIDED LOW BACK PAIN WITH RIGHT-SIDED SCIATICA: Primary | ICD-10-CM

## 2023-01-05 DIAGNOSIS — M51.26 PROTRUSION OF LUMBAR INTERVERTEBRAL DISC: ICD-10-CM

## 2023-01-05 NOTE — PROGRESS NOTES
Assessment:  1  Acute right-sided low back pain with right-sided sciatica    2  Protrusion of lumbar intervertebral disc    3  Degenerative disc disease at L5-S1 level        Plan:  I discussed with the patient that since there has been mod-sig improvement in the pain symptoms that we will hold off on any repeat injections at this point in time  However, I did explain that if over the next 8-12 weeks the pain symptoms should return, worsen, and/or change, we could consider a repeat injection  If after the 8-12 weeks an office visit may be warranted for re-evaluation  The patient was agreeable and verbalized an understanding  At this point time as the patient had many questions about what she can do to improve her condition, I explained to the patient that most likely she will not be able to change or improve the underlying etiology by any significant means that the best thing she can do is work on core strengthening and to slowly and steadily increase her activity, as tolerated, allowing pain to be her guide  The patient was agreeable and verbalized an understanding  I discussed with the patient that at this point time she can follow up with our office on an as-needed basis  I did review the patient that if her pain symptoms should change, worsen, and/or if she would experience any new symptoms she would like to be evaluated for, she should give our office a call  The patient was agreeable and verbalized an understanding  History of Present Illness: The patient is a 57 y o  female last seen on 11/14/2022 who presents for a follow up office visit in regards to right-sided low back pain with radiculopathy secondary to lumbar degenerative disc with disc protrusion    The patient currently reports that this point time as she is status post a right L5 and S1 transforaminal epidurals or injection on November 14, 2022 with Dr Saw Yang that she is noting at least greater than 80% overall improvement of her right-sided low back and right lower extremity radicular symptoms  The patient reports that she even made it through vacation and the holidays were very minimal pain and her pain actually seemed to worsen slightly when she went back to work and was sitting more  The patient reports that she has been trying to be more cognizant of changing her position frequently and is using a standing desk at times so she is in different positions and overall feels that her pain is intermittent and much more manageable and definitely significantly improved from the injection  The patient presents today for regular postprocedure follow-up visit  I have personally reviewed and/or updated the patient's past medical history, past surgical history, family history, social history, current medications, allergies, and vital signs today  Review of Systems:    Review of Systems   Respiratory: Negative for shortness of breath  Cardiovascular: Negative for chest pain  Gastrointestinal: Negative for constipation, diarrhea, nausea and vomiting  Musculoskeletal: Negative for arthralgias, gait problem, joint swelling (joint stiffness) and myalgias  Skin: Negative for rash  Neurological: Negative for dizziness, seizures and weakness  All other systems reviewed and are negative          Past Medical History:   Diagnosis Date   • No known problems        Past Surgical History:   Procedure Laterality Date   • HYSTERECTOMY      Resolved 7/2003   • OOPHORECTOMY Left     with hysterectomy       Family History   Problem Relation Age of Onset   • Diabetes Mother         Mellitus   • Lymphoma Mother    • Diabetes Maternal Grandmother         Mellitus   • No Known Problems Father    • No Known Problems Sister    • No Known Problems Daughter    • No Known Problems Maternal Grandfather    • No Known Problems Paternal Grandmother    • No Known Problems Paternal Grandfather    • No Known Problems Maternal Aunt    • No Known Problems Maternal Aunt        Social History     Occupational History   • Not on file   Tobacco Use   • Smoking status: Never   • Smokeless tobacco: Never   Vaping Use   • Vaping Use: Never used   Substance and Sexual Activity   • Alcohol use: Yes     Comment: Social   • Drug use: No   • Sexual activity: Not on file       No current outpatient medications on file  Allergies   Allergen Reactions   • Pollen Extract    • Sulfa Antibiotics GI Intolerance       Physical Exam:    /72 (BP Location: Left arm, Patient Position: Sitting, Cuff Size: Standard)   Pulse 88   Temp 97 8 °F (36 6 °C)   Ht 5' 7" (1 702 m)   Wt 76 7 kg (169 lb)   BMI 26 47 kg/m²     Constitutional:normal, well developed, well nourished, alert, in no distress and non-toxic and no overt pain behavior  Eyes:anicteric  HEENT:grossly intact  Neck:supple, symmetric, trachea midline and no masses   Pulmonary:even and unlabored  Cardiovascular:No edema or pitting edema present  Skin:Normal without rashes or lesions and well hydrated  Psychiatric:Mood and affect appropriate  Neurologic:Cranial Nerves II-XII grossly intact  Musculoskeletal:normal      Imaging  No orders to display         No orders of the defined types were placed in this encounter

## 2023-01-23 ENCOUNTER — TELEPHONE (OUTPATIENT)
Dept: PAIN MEDICINE | Facility: CLINIC | Age: 63
End: 2023-01-23

## 2023-01-23 DIAGNOSIS — M51.26 PROTRUSION OF LUMBAR INTERVERTEBRAL DISC: ICD-10-CM

## 2023-01-23 DIAGNOSIS — M54.16 RIGHT LUMBAR RADICULOPATHY: Primary | ICD-10-CM

## 2023-01-23 NOTE — TELEPHONE ENCOUNTER
Minor Holley    Doctor: Leroy Sen    Reason for call: pt called to schedule a repeat procedure that she had 11/14/2022    Last OV was 1/5    Pain level 7/10    Call back#: [phone]

## 2023-01-24 ENCOUNTER — HOSPITAL ENCOUNTER (OUTPATIENT)
Dept: MAMMOGRAPHY | Facility: IMAGING CENTER | Age: 63
Discharge: HOME | End: 2023-01-24

## 2023-01-24 VITALS — BODY MASS INDEX: 25.9 KG/M2 | HEIGHT: 67 IN | WEIGHT: 165 LBS

## 2023-01-24 DIAGNOSIS — Z12.31 ENCOUNTER FOR SCREENING MAMMOGRAM FOR BREAST CANCER: ICD-10-CM

## 2023-01-24 NOTE — TELEPHONE ENCOUNTER
Can you please call the patient and schedule her for an L5-S1 LESI to the Right with Dr Pedro Cortes and then a f/u OV 3-4 weeks after the injection  I put the order in  Thank you

## 2023-01-24 NOTE — TELEPHONE ENCOUNTER
S/w pt, stated that her pain is in the back of her right leg, from her hip to her knee  Pt stated that her pain resumed on 1/13 with no obvious cause  Advised pt, the writer will d/w DG  Anticipate a cb from spa surg coord to schedule as discussed at your 1/5 fu ov  Pt verbalized understanding and appreciation  Denied blood thinning medication

## 2023-01-26 NOTE — TELEPHONE ENCOUNTER
PRE OP INSTRUCTIONS:     -If you are on prescription blood thinners, you may have to hold the medication for several days before the procedure  Please call the office to    discuss medication holds at [phone]   -Do not eat or drink ONE HOUR prior to your procedure  If you are diabetic, may follow regular breakfast/lunch schedule and take usual    diabetic medications   -Lumbar( low back) procedure, please wear comfortable slacks/pants   -Cervical (neck) procedure, please wear a shirt/blouse that is easy to remove   -A  is required to take you home form your procedure   -Continue all to take prescribed medication the day of your procedure, including blood pressure medications   -If you are prescribe antibiotics, have an active infection or have an open wound, please contact the office at [phone]   -Please refrain from any vaccinations two weeks before and two weeks after injection   -Insurance authorization received in not a guarantee of payment per your insurance company's authorization disclaimer and it is    your responsibility to verify your benefits  -If you have any questions about the instructions, please call me at [phone]    Hold medication  x _ full days prior, last dose on _  Patient advised to hold medication from Date till their appointment at that time instructions to restart will be given  Patient stated verbal understanding  Aware that nursing will call her to review hold dates as well

## 2023-02-02 ENCOUNTER — HOSPITAL ENCOUNTER (OUTPATIENT)
Dept: RADIOLOGY | Facility: CLINIC | Age: 63
Discharge: HOME | End: 2023-02-02
Admitting: ANESTHESIOLOGY

## 2023-02-02 VITALS
RESPIRATION RATE: 18 BRPM | HEART RATE: 99 BPM | TEMPERATURE: 97.5 F | DIASTOLIC BLOOD PRESSURE: 73 MMHG | OXYGEN SATURATION: 94 % | SYSTOLIC BLOOD PRESSURE: 113 MMHG

## 2023-02-02 DIAGNOSIS — M51.26 PROTRUSION OF LUMBAR INTERVERTEBRAL DISC: ICD-10-CM

## 2023-02-02 DIAGNOSIS — M54.16 RIGHT LUMBAR RADICULOPATHY: ICD-10-CM

## 2023-02-02 RX ORDER — METHYLPREDNISOLONE ACETATE 80 MG/ML
80 INJECTION, SUSPENSION INTRA-ARTICULAR; INTRALESIONAL; INTRAMUSCULAR; PARENTERAL; SOFT TISSUE ONCE
Status: COMPLETED | OUTPATIENT
Start: 2023-02-02 | End: 2023-02-02

## 2023-02-02 RX ADMIN — IOHEXOL 1 ML: 300 INJECTION, SOLUTION INTRAVENOUS at 15:02

## 2023-02-02 RX ADMIN — METHYLPREDNISOLONE ACETATE 80 MG: 80 INJECTION, SUSPENSION INTRA-ARTICULAR; INTRALESIONAL; INTRAMUSCULAR; SOFT TISSUE at 15:02

## 2023-02-02 NOTE — H&P
History of Present Illness: The patient is a 70 y o  female who presents with complaints of low back and leg pain  Past Medical History:   Diagnosis Date   • No known problems        Past Surgical History:   Procedure Laterality Date   • HYSTERECTOMY      Resolved 7/2003   • OOPHORECTOMY Left     with hysterectomy       No current outpatient medications on file  Current Facility-Administered Medications:   •  iohexol (OMNIPAQUE) 300 mg/mL injection 50 mL, 50 mL, Epidural, Once, Juan Padron DO  •  methylPREDNISolone acetate (DEPO-MEDROL) injection 80 mg, 80 mg, Epidural, Once, Juan Padron DO    Allergies   Allergen Reactions   • Pollen Extract    • Sulfa Antibiotics GI Intolerance       Physical Exam:   General: Awake, Alert, Oriented x 3, Mood and affect appropriate  Respiratory: Respirations even and unlabored  Cardiovascular: Peripheral pulses intact; no edema  Musculoskeletal Exam: Decreased range of motion lumbar spine    ASA Score: II         Assessment:   1  Right lumbar radiculopathy    2   Protrusion of lumbar intervertebral disc        Plan: L5-S1 LESI to the Right

## 2023-02-02 NOTE — DISCHARGE INSTRUCTIONS
Epidural Steroid Injection   WHAT YOU NEED TO KNOW:   An epidural steroid injection (LIANNA) is a procedure to inject steroid medicine into the epidural space  The epidural space is between your spinal cord and vertebrae  Steroids reduce inflammation and fluid buildup in your spine that may be causing pain  You may be given pain medicine along with the steroids  ACTIVITY  Do not drive or operate machinery today  No strenuous activity today - bending, lifting, etc   You may resume normal activites starting tomorrow - start slowly and as tolerated  You may shower today, but no tub baths or hot tubs  You may have numbness for several hours from the local anesthetic  Please use caution and common sense, especially with weight-bearing activities  CARE OF THE INJECTION SITE  If you have soreness or pain, apply ice to the area today (20 minutes on/20 minutes off)  Starting tomorrow, you may use warm, moist heat or ice if needed  You may have an increase or change in your discomfort for 36-48 hours after your treatment  Apply ice and continue with any pain medication you have been prescribed  Notify the [facility] if you have any of the following: redness, drainage, swelling, headache, stiff neck or fever above 100°F     SPECIAL INSTRUCTIONS  Our office will contact you in approximately 7 days for a progress report  MEDICATIONS  Continue to take all routine medications  Our office may have instructed you to hold some medications  As no general anesthesia was used in today's procedure, you should not experience any side effects related to anesthesia  If you are diabetic, the steroids used in today's injection may temporarily increase your blood sugar levels after the first few days after your injection  Please keep a close eye on your sugars and alert the doctor who manages your diabetes if your sugars are significantly high from your baseline or you are symptomatic       If you have a problem specifically related to your procedure, please call our office at [phone]  Problems not related to your procedure should be directed to your primary care physician

## 2023-02-09 ENCOUNTER — TELEPHONE (OUTPATIENT)
Dept: PAIN MEDICINE | Facility: CLINIC | Age: 63
End: 2023-02-09

## 2023-02-13 NOTE — TELEPHONE ENCOUNTER
S/w pt, advised that the writer will make SL aware of her improvement s/p procedure  It is ok to resume normal activities in 24 - 48 hours after procedure  Pt stated that she is questioning what can she do to improve her quality of life over the next 20+ years given her history of chronic pain? Advised pt, fu w/ NM as scheduled on 3/2  Pt verbalized understanding and agreement

## 2023-02-13 NOTE — TELEPHONE ENCOUNTER
Caller: patient   Doctor/office: Holland Cooper  #: [phone]    % of improvement: 40  Pain Scale (1-10): 8/10 on / off      Patient would like to discuss with a nurse if exercise is helpful at this time

## 2023-03-02 ENCOUNTER — OFFICE VISIT (OUTPATIENT)
Dept: PAIN MEDICINE | Facility: CLINIC | Age: 63
End: 2023-03-02

## 2023-03-02 VITALS
HEART RATE: 88 BPM | BODY MASS INDEX: 26.53 KG/M2 | SYSTOLIC BLOOD PRESSURE: 136 MMHG | HEIGHT: 67 IN | DIASTOLIC BLOOD PRESSURE: 84 MMHG | TEMPERATURE: 98.6 F | WEIGHT: 169 LBS

## 2023-03-02 DIAGNOSIS — G89.29 CHRONIC BILATERAL LOW BACK PAIN, UNSPECIFIED WHETHER SCIATICA PRESENT: ICD-10-CM

## 2023-03-02 DIAGNOSIS — M51.36 DISC DEGENERATION, LUMBAR: ICD-10-CM

## 2023-03-02 DIAGNOSIS — M54.50 CHRONIC BILATERAL LOW BACK PAIN, UNSPECIFIED WHETHER SCIATICA PRESENT: ICD-10-CM

## 2023-03-02 DIAGNOSIS — M47.816 LUMBAR SPONDYLOSIS: ICD-10-CM

## 2023-03-02 DIAGNOSIS — M51.16 INTERVERTEBRAL DISC DISORDER WITH RADICULOPATHY OF LUMBAR REGION: ICD-10-CM

## 2023-03-02 DIAGNOSIS — G89.4 CHRONIC PAIN SYNDROME: Primary | ICD-10-CM

## 2023-03-02 NOTE — PROGRESS NOTES
Assessment:  1  Chronic pain syndrome    2  Chronic bilateral low back pain, unspecified whether sciatica present    3  Lumbar spondylosis    4  Intervertebral disc disorder with radiculopathy of lumbar region    5  Disc degeneration, lumbar        Plan:  The patient is a 70 y o  female last seen on 2-2-23 who presents for a follow up office visit in regards to chronic pain secondary to right leg pain, lumbar intervertebral disc disorder with radiculopathy, lumbar spondylosis, lumbar degenerative disc disease, and lumbar foraminal stenosis  The patient presents today with right posterior thigh pain  She had underwent a lumbar translaminar epidural steroid injection at L5-S1 on February 2, 2023 which had provided 40% pain relief  Her pain is no longer constant, and she feels it is tolerable at this time  She was made aware that epidural steroid injections typically provide 3 to 6 months of pain relief  If the pain would worsen at that time, we can repeat an injection  I would recommend scheduling her for a right L5 and S1 transforaminal epidural steroid injection as that one had provided 80% pain relief in the past     We discussed starting a neuropathic agent to help with her remaining symptoms, such as gabapentin, Lyrica, or Cymbalta  However the patient would like to hold off on this option  If she changes her mind, I instructed her to contact the office and I will start her on gabapentin 100 mg 1 tab at bedtime  I also provided the patient with a list of exercises/stretches to do at home, to help continue to strengthen the core and low back muscles  The patient will follow-up as needed for reevaluation  The patient was advised to contact the office should their symptoms worsen in the interim  The patient was agreeable and verbalized an understanding  History of Present Illness:     The patient is a 70 y o  female last seen on 2-2-23 who presents for a follow up office visit in regards to chronic pain secondary to right leg pain, lumbar intervertebral disc disorder with radiculopathy, lumbar spondylosis, lumbar degenerative disc disease, and lumbar foraminal stenosis  Her last office visit was February 2, 2023 in which she had a lumbar translaminar epidural steroid injection at L5-S1  Prior to this she underwent a right L5 and S1 transforaminal epidural steroid injection on November 14, 2022  Patient presents today with ongoing right leg pain  She feels the pain has improved since the last office visit  She states the epidural steroid injection had provided 40% pain relief  The pain is no longer constant pain  The pain primarily is located in the posterior aspect of the right thigh  She states sitting can cause a sharp pain in the leg  The pain is intermittent, and described as burning, sharp, and shooting  She is rating her pain a 7/10 on the numeric rating scale  She is currently taking no prescription medications for pain  She had completed several months of PT with no relief  I have personally reviewed and/or updated the patient's past medical history, past surgical history, family history, social history, current medications, allergies, and vital signs today  Review of Systems:    Review of Systems   Constitutional: Negative for fever and unexpected weight change  HENT: Negative for trouble swallowing  Eyes: Negative for visual disturbance  Respiratory: Negative for shortness of breath and wheezing  Cardiovascular: Negative for chest pain and palpitations  Gastrointestinal: Negative for constipation, diarrhea, nausea and vomiting  Endocrine: Negative for cold intolerance, heat intolerance and polydipsia  Genitourinary: Negative for difficulty urinating and frequency  Musculoskeletal: Negative for arthralgias, gait problem, joint swelling and myalgias  Skin: Negative for rash     Neurological: Negative for dizziness, seizures, syncope, weakness and headaches  Hematological: Does not bruise/bleed easily  Psychiatric/Behavioral: Negative for dysphoric mood  All other systems reviewed and are negative  Past Medical History:   Diagnosis Date   • No known problems        Past Surgical History:   Procedure Laterality Date   • HYSTERECTOMY      Resolved 7/2003   • OOPHORECTOMY Left     with hysterectomy       Family History   Problem Relation Age of Onset   • Diabetes Mother         Mellitus   • Lymphoma Mother [de-identified]   • No Known Problems Father    • No Known Problems Sister    • No Known Problems Daughter    • Diabetes Maternal Grandmother         Mellitus   • No Known Problems Maternal Grandfather    • No Known Problems Paternal Grandmother    • No Known Problems Paternal Grandfather    • No Known Problems Maternal Aunt    • No Known Problems Maternal Aunt        Social History     Occupational History   • Not on file   Tobacco Use   • Smoking status: Never   • Smokeless tobacco: Never   Vaping Use   • Vaping Use: Never used   Substance and Sexual Activity   • Alcohol use: Yes     Comment: Social   • Drug use: No   • Sexual activity: Not on file       No current outpatient medications on file  Allergies   Allergen Reactions   • Pollen Extract    • Sulfa Antibiotics GI Intolerance       Physical Exam:    There were no vitals taken for this visit  Constitutional:normal, well developed, well nourished, alert, in no distress and non-toxic and no overt pain behavior    Eyes:anicteric  HEENT:grossly intact  Neck:supple, symmetric, trachea midline and no masses   Pulmonary:even and unlabored  Cardiovascular:No edema or pitting edema present  Skin:Normal without rashes or lesions and well hydrated  Psychiatric:Mood and affect appropriate  Neurologic:Cranial Nerves II-XII grossly intact  Musculoskeletal:normal      Imaging    MRI LUMBAR SPINE WITHOUT CONTRAST     INDICATION: M54 41: Lumbago with sciatica, right side  M41 86: Other forms of scoliosis, lumbar region  M51 37: Other intervertebral disc degeneration, lumbosacral region      COMPARISON:  X-rays dated 9/6/2022     TECHNIQUE:  Sagittal T1, sagittal T2, sagittal inversion recovery, axial T1 and axial T2, coronal T2     IMAGE QUALITY:  Diagnostic     FINDINGS:     VERTEBRAL BODIES:  There are 5 lumbar type vertebral bodies  Normal alignment of the lumbar spine  No spondylolysis or spondylolisthesis  No scoliosis  No compression fracture  Normal marrow signal is identified within the visualized bony   structures  No discrete marrow lesion      SACRUM:  Normal signal within the sacrum  No evidence of insufficiency or stress fracture      DISTAL CORD AND CONUS:  Normal size and signal within the distal cord and conus      PARASPINAL SOFT TISSUES:  Small T2 hyperintensities are seen within the liver and kidneys most likely representing small cysts  The liver is enlarged particularly the right lobe which extends inferiorly into the abdomen      LOWER THORACIC DISC SPACES:  Normal disc height and signal   No disc herniation, canal stenosis or foraminal narrowing      LUMBAR DISC SPACES:     L1-L2:  Normal      L2-L3:  Normal      L3-L4:  Minimal annular bulging bilaterally with slight loss of disc height  Mild facet degenerative change  No disc herniation  No cauda equina impingement or foraminal narrowing      L4-L5:  Slight disc desiccation without loss of disc height  Mild annular bulging with a small central disc protrusion  No canal stenosis  No foraminal nerve impingement      L5-S1:  Disc desiccation and loss of disc height  Annular bulging with mild endplate hypertrophic change  Small right foraminal disc protrusion  Mild foraminal endplate hypertrophic change bilaterally  There is no canal stenosis or mass effect upon   the thecal sac  Mild foraminal narrowing bilaterally      IMPRESSION:     Lumbar degenerative disc disease L3-4, L4-5 and L5-S1 as described above    Mild bilateral foraminal narrowing at L5-S1 as a result of disc and endplate changes         No orders to display         No orders of the defined types were placed in this encounter

## 2023-03-02 NOTE — PATIENT INSTRUCTIONS
Lower Back Exercises   AMBULATORY CARE:   Lower back exercises  help heal and strengthen your back muscles to prevent another injury  Ask your healthcare provider if you need to see a physical therapist for more advanced exercises  Seek care immediately if:   You have severe pain that prevents you from moving  Call your doctor if:   Your pain becomes worse  You have new pain  You have questions or concerns about your condition or care  Do lower back exercises safely:   Do the exercises on a mat or firm surface (not on a bed)  A firm surface will support your spine and prevent low back pain  Move slowly and smoothly  Avoid fast or jerky motions  Breathe normally  Do not hold your breath  Stop if you feel pain  It is normal to feel some discomfort at first, but you should not feel pain  Regular exercise will help decrease your discomfort over time  Lower back exercises: Your healthcare provider may recommend that you do back exercises 10 to 30 minutes each day  He or she may also recommend that you do exercises 1 to 3 times each day  Ask your provider which exercises are best for you and how often to do them  Ankle pumps:  Lie on your back  Move your foot up (with your toes pointing toward your head)  Then, move your foot down (with your toes pointing away from you)  Repeat this exercise 10 times on each side  Heel slides:  Lie on your back  Slowly bend one leg and then straighten it  Next, bend the other leg and then straighten it  Repeat 10 times on each side  Pelvic tilt:  Lie on your back with your knees bent and feet flat on the floor  Place your arms in a relaxed position beside your body  Tighten the muscles of your abdomen and flatten your back against the floor  Hold for 5 seconds  Repeat 5 times  Back stretch:  Lie on your back with your hands behind your head  Bend your knees and turn the lower half of your body to one side   Hold this position for 10 seconds  Repeat 3 times on each side  Straight leg raises:  Lie on your back with one leg straight  Bend the other knee  Tighten your abdomen and then slowly lift the straight leg up about 6 to 12 inches off the floor  Hold for 1 to 5 seconds  Lower your leg slowly  Repeat 10 times on each leg  Knee-to-chest:  Lie on your back with your knees bent and feet flat on the floor  Pull one of your knees toward your chest and hold it there for 5 seconds  Return your leg to the starting position  Lift the other knee toward your chest and hold for 5 seconds  Do this 5 times on each side  Cat and camel:  Place your hands and knees on the floor  Arch your back upward toward the ceiling and lower your head  Round out your spine as much as you can  Hold for 5 seconds  Lift your head upward and push your chest downward toward the floor  Hold for 5 seconds  Do 3 sets or as directed  Wall squats:  Stand with your back against a wall  Tighten the muscles of your abdomen  Slowly lower your body until your knees are bent at a 45 degree angle  Hold this position for 5 seconds  Slowly move back up to a standing position  Repeat 10 times  Curl up:  Lie on your back with your knees bent and feet flat on the floor  Place your hands, palms down, underneath the curve in your lower back  Next, with your elbows on the floor, lift your shoulders and chest 2 to 3 inches  Keep your head in line with your shoulders  Hold this position for 5 seconds  When you can do this exercise without pain for 10 to 15 seconds, you may add a rotation  While your shoulders and chest are lifted off the ground, turn slightly to the left and hold  Repeat on the other side  Bird dog:  Place your hands and knees on the floor  Keep your wrists directly below your shoulders and your knees directly below your hips  Pull your belly button in toward your spine  Do not flatten or arch your back  Tighten your abdominal muscles  Raise one arm straight out so that it is aligned with your head  Next, raise the leg opposite your arm  Hold this position for 15 seconds  Lower your arm and leg slowly and change sides  Do 5 sets  Follow up with your doctor as directed:  Write down your questions so you remember to ask them during your visits  © Copyright [employer] 2022 Information is for End User's use only and may not be sold, redistributed or otherwise used for commercial purposes  The above information is an  only  It is not intended as medical advice for individual conditions or treatments  Talk to your doctor, nurse or pharmacist before following any medical regimen to see if it is safe and effective for you

## 2023-07-31 ENCOUNTER — TELEPHONE (OUTPATIENT)
Dept: FAMILY MEDICINE CLINIC | Facility: CLINIC | Age: 63
End: 2023-07-31

## 2023-07-31 NOTE — TELEPHONE ENCOUNTER
Patient states she only feels like she has a cold. She will quarantine for 5 days and mask for 5 days.

## 2023-07-31 NOTE — TELEPHONE ENCOUNTER
Covid positive today- stuffy nose & coughing no fever. Noticed she could not smell essential oils yesterday. Symptoms started late Friday. Looking for recommendations on how to proceed.  was sick the previous week and saw Loar Conley on Friday.     [phone]

## 2023-08-02 NOTE — TELEPHONE ENCOUNTER
Spoke to provider, patient not finished with quarantine until tonight at midnight, advised patient per provider okay to come tomorrow advised to mask stay in car schedule nurse visit for 9 am

## 2023-08-02 NOTE — TELEPHONE ENCOUNTER
patient called been in quarantine since last Saturday July 29th, 2023. Patient had very light cold sx-stuffy nose,cough,no fever,loss of smell. Patient getting ready to go back to work tomorrow, the home test [facility] however are giving her results that are confusing-the control line is faint and the sample line is dark. Patient curious if she can come to the office to get her COVID tested so that she can go back to work tomorrow?  She was diagnosed positive this Monday July 31st. Patient is inserting photos of her COVID test onto [facility] [place]

## 2023-08-03 NOTE — TELEPHONE ENCOUNTER
Advised patient that Dr. Douglas Murrieta reviewed her results she sent through My Chart and they are positive. Drafted letter for work and sent to patient.

## 2023-08-23 DIAGNOSIS — Z13.29 SCREENING FOR ENDOCRINE DISORDER: ICD-10-CM

## 2023-08-23 DIAGNOSIS — Z13.220 SCREENING FOR LIPID DISORDERS: ICD-10-CM

## 2023-08-23 DIAGNOSIS — Z00.00 ANNUAL PHYSICAL EXAM: Primary | ICD-10-CM

## 2023-08-23 DIAGNOSIS — Z13.29 SCREENING FOR THYROID DISORDER: ICD-10-CM

## 2023-08-23 DIAGNOSIS — Z13.0 SCREENING FOR DEFICIENCY ANEMIA: ICD-10-CM

## 2023-09-25 ENCOUNTER — APPOINTMENT (OUTPATIENT)
Dept: LAB | Facility: HOSPITAL | Age: 63
End: 2023-09-25
Payer: COMMERCIAL

## 2023-09-25 LAB
ALBUMIN SERPL BCP-MCNC: 4.3 G/DL (ref 3.5–5)
ALP SERPL-CCNC: 92 U/L (ref 34–104)
ALT SERPL W P-5'-P-CCNC: 15 U/L (ref 7–52)
ANION GAP SERPL CALCULATED.3IONS-SCNC: 7 MMOL/L
AST SERPL W P-5'-P-CCNC: 16 U/L (ref 13–39)
BASOPHILS # BLD AUTO: 0.03 THOUSANDS/ÂΜL (ref 0–0.1)
BASOPHILS NFR BLD AUTO: 1 % (ref 0–1)
BILIRUB SERPL-MCNC: 0.69 MG/DL (ref 0.2–1)
BUN SERPL-MCNC: 19 MG/DL (ref 5–25)
CALCIUM SERPL-MCNC: 9.3 MG/DL (ref 8.4–10.2)
CHLORIDE SERPL-SCNC: 105 MMOL/L (ref 96–108)
CHOLEST SERPL-MCNC: 176 MG/DL
CO2 SERPL-SCNC: 30 MMOL/L (ref 21–32)
CREAT SERPL-MCNC: 0.65 MG/DL (ref 0.6–1.3)
EOSINOPHIL # BLD AUTO: 0.11 THOUSAND/ÂΜL (ref 0–0.61)
EOSINOPHIL NFR BLD AUTO: 3 % (ref 0–6)
ERYTHROCYTE [DISTWIDTH] IN BLOOD BY AUTOMATED COUNT: 12 % (ref 11.6–15.1)
GFR SERPL CREATININE-BSD FRML MDRD: 93 ML/MIN/1.73SQ M
GLUCOSE P FAST SERPL-MCNC: 88 MG/DL (ref 65–99)
HCT VFR BLD AUTO: 45.8 % (ref 34.8–46.1)
HDLC SERPL-MCNC: 48 MG/DL
HGB BLD-MCNC: 14.8 G/DL (ref 11.5–15.4)
IMM GRANULOCYTES # BLD AUTO: 0.01 THOUSAND/UL (ref 0–0.2)
IMM GRANULOCYTES NFR BLD AUTO: 0 % (ref 0–2)
LDLC SERPL CALC-MCNC: 113 MG/DL (ref 0–100)
LYMPHOCYTES # BLD AUTO: 0.8 THOUSANDS/ÂΜL (ref 0.6–4.47)
LYMPHOCYTES NFR BLD AUTO: 22 % (ref 14–44)
MCH RBC QN AUTO: 30.1 PG (ref 26.8–34.3)
MCHC RBC AUTO-ENTMCNC: 32.3 G/DL (ref 31.4–37.4)
MCV RBC AUTO: 93 FL (ref 82–98)
MONOCYTES # BLD AUTO: 0.25 THOUSAND/ÂΜL (ref 0.17–1.22)
MONOCYTES NFR BLD AUTO: 7 % (ref 4–12)
NEUTROPHILS # BLD AUTO: 2.38 THOUSANDS/ÂΜL (ref 1.85–7.62)
NEUTS SEG NFR BLD AUTO: 67 % (ref 43–75)
NONHDLC SERPL-MCNC: 128 MG/DL
NRBC BLD AUTO-RTO: 0 /100 WBCS
PLATELET # BLD AUTO: 111 THOUSANDS/UL (ref 149–390)
PMV BLD AUTO: 12.3 FL (ref 8.9–12.7)
POTASSIUM SERPL-SCNC: 4.6 MMOL/L (ref 3.5–5.3)
PROT SERPL-MCNC: 6.8 G/DL (ref 6.4–8.4)
RBC # BLD AUTO: 4.92 MILLION/UL (ref 3.81–5.12)
SODIUM SERPL-SCNC: 142 MMOL/L (ref 135–147)
TRIGL SERPL-MCNC: 76 MG/DL
TSH SERPL DL<=0.05 MIU/L-ACNC: 2.39 UIU/ML (ref 0.45–4.5)
WBC # BLD AUTO: 3.58 THOUSAND/UL (ref 4.31–10.16)

## 2023-09-28 ENCOUNTER — TELEPHONE (OUTPATIENT)
Dept: FAMILY MEDICINE CLINIC | Facility: CLINIC | Age: 63
End: 2023-09-28

## 2023-09-28 NOTE — TELEPHONE ENCOUNTER
Called Pt. Spoke to Pt, and Pt advised on results. Lee Britton, Your labs are overall stable but your white blood cell count and platelets are low. Do you have any signs of bleeding or easy bruising recently? Not much, bruised once like 2 weeks ago but nothing out of ordinary  Taking more aspirin or NSAIDs? Not more than usual  Looks like your WBC have been low chronically for last few years. I would recommend seeing a hematologist to have this further investigated.   provided hematology number [facility]

## 2023-09-28 NOTE — TELEPHONE ENCOUNTER
----- Message from Moira Delcid, [facility] sent at 9/28/2023  3:10 PM EDT -----  Bevely [place], Your labs are overall stable but your white blood cell count and platelets are low. Do you have any signs of bleeding or easy bruising recently? Taking more aspirin or NSAIDs? Looks like your WBC have been low chronically for last few years. I would recommend seeing a hematologist to have this further investigated.

## 2023-10-20 DIAGNOSIS — D72.819 LEUKOPENIA, UNSPECIFIED TYPE: Primary | ICD-10-CM

## 2023-11-10 NOTE — PROGRESS NOTES
Hematology/Oncology Outpatient Consult Note  Sherry Giordano 70 y.o. female MRN: @ Encounter: [id number]        Date:  11/13/2023        CC: Leukopenia      HPI:  Sherry Giordano is a 70-year-old female with a history of osteopenia, chronic pain syndrome secondary to lumbar intervertebral disc disorder with radiculopathy who was referred to hematology for evaluation of leukopenia. She is being seen for initial consultation 11/13/2023     Blood work on file reviewed. CBC with differential has demonstrated isolated mild leukopenia dating back to 2021. No evidence of neutropenia  Most recent CBC with differential completed on 9/25/2023 shows white count 3.58, hemoglobin 14.8, MCV 93, platelet count 584, differential normal    Patient denies any history of recurrent fever/chills/infections. She works at [employer] and is exposed to many people and reports she is rarely sick. She does not take any prescription meds. She does take a daily calcium with vitamin D and uses Flonase as needed. Denies any history of easy bruising. No abnormal bleeding  Denies constitutional symptoms. No B symptoms  She is up-to-date on all cancer screenings        Test Results:    Imaging: No results found. Labs:   Lab Results   Component Value Date    WBC 3.58 (L) 09/25/2023    HGB 14.8 09/25/2023    HCT 45.8 09/25/2023    MCV 93 09/25/2023     (L) 09/25/2023     Lab Results   Component Value Date     12/30/2013    K 4.6 09/25/2023     09/25/2023    CO2 30 09/25/2023    ANIONGAP 4 12/30/2013    BUN 19 09/25/2023    CREATININE 0.65 09/25/2023    GLUCOSE 100 12/30/2013    GLUF 88 09/25/2023    CALCIUM 9.3 09/25/2023    AST 16 09/25/2023    ALT 15 09/25/2023    ALKPHOS 92 09/25/2023    PROT 6.9 12/30/2013    BILITOT 0.6 12/30/2013    EGFR 93 09/25/2023             ROS:  Review of Systems   All other systems reviewed and are negative.           Active Problems:   Patient Active Problem List   Diagnosis Lipoma of torso    Low bone density for age    Acute right-sided low back pain with right-sided sciatica    Dextroscoliosis of lumbar spine    Degenerative disc disease at L5-S1 level    Osteopenia of neck of left femur    Post-menopausal    Protrusion of lumbar intervertebral disc    Right lumbar radiculopathy    Tinnitus of both ears       Past Medical History:   Past Medical History:   Diagnosis Date    No known problems        Surgical History:   Past Surgical History:   Procedure Laterality Date    HYSTERECTOMY      Resolved 7/2003    OOPHORECTOMY Left     with hysterectomy       Family History:    Family History   Problem Relation Age of Onset    Diabetes Mother         Mellitus    Lymphoma Mother 80    No Known Problems Father     No Known Problems Sister     No Known Problems Daughter     Diabetes Maternal Grandmother         Mellitus    No Known Problems Maternal Grandfather     No Known Problems Paternal Grandmother     No Known Problems Paternal Grandfather     No Known Problems Maternal Aunt     No Known Problems Maternal Aunt        Cancer-related family history includes Lymphoma (age of onset: 80) in her mother.     Social History:   Social History     Socioeconomic History    Marital status: /Civil Union     Spouse name: Not on file    Number of children: Not on file    Years of education: Not on file    Highest education level: Not on file   Occupational History    Not on file   Tobacco Use    Smoking status: Never    Smokeless tobacco: Never   Vaping Use    Vaping Use: Never used   Substance and Sexual Activity    Alcohol use: Yes     Comment: Social    Drug use: No    Sexual activity: Not on file   Other Topics Concern    Not on file   Social History Narrative    Not on file     Social Determinants of Health     Financial Resource Strain: Not on file   Food Insecurity: Not on file   Transportation Needs: Not on file   Physical Activity: Not on file   Stress: Not on file   Social Connections: Not on file   Intimate Partner Violence: Not on file   Housing Stability: Not on file       Current Medications:   Current Outpatient Medications   Medication Sig Dispense Refill    fluticasone (FLONASE) 50 mcg/act nasal spray SPRAY 2 SPRAYS INTO EACH NOSTRIL EVERY DAY 16 mL 3     No current facility-administered medications for this visit. Allergies: Allergies   Allergen Reactions    Pollen Extract     Sulfa Antibiotics GI Intolerance         Physical Exam:    Body surface area is 1.87 meters squared. Wt Readings from Last 3 Encounters:   11/13/23 75.8 kg (167 lb)   03/02/23 76.7 kg (169 lb)   01/24/23 74.8 kg (165 lb)        Temp Readings from Last 3 Encounters:   11/13/23 97.6 °F (36.4 °C) (Temporal)   03/02/23 98.6 °F (37 °C)   02/02/23 97.5 °F (36.4 °C) (Temporal)        BP Readings from Last 3 Encounters:   11/13/23 122/81   03/02/23 136/84   02/02/23 113/73         Pulse Readings from Last 3 Encounters:   11/13/23 99   03/02/23 88   02/02/23 99          Physical Exam  Constitutional:       General: She is not in acute distress. Appearance: Normal appearance. HENT:      Head: Normocephalic and atraumatic. Eyes:      General: No scleral icterus. Right eye: No discharge. Left eye: No discharge. Conjunctiva/sclera: Conjunctivae normal.   Cardiovascular:      Rate and Rhythm: Normal rate and regular rhythm. Pulmonary:      Effort: Pulmonary effort is normal. No respiratory distress. Breath sounds: Normal breath sounds. Abdominal:      General: Bowel sounds are normal. There is no distension. Palpations: Abdomen is soft. There is no mass. Tenderness: There is no abdominal tenderness. Musculoskeletal:         General: Normal range of motion. Lymphadenopathy:      Cervical: No cervical adenopathy. Upper Body:      Right upper body: No supraclavicular, axillary or pectoral adenopathy.       Left upper body: No supraclavicular, axillary or pectoral adenopathy. Skin:     General: Skin is warm and dry. Neurological:      General: No focal deficit present. Mental Status: She is alert and oriented to person, place, and time. Psychiatric:         Mood and Affect: Mood normal.         Behavior: Behavior normal.              Assessment/ Plan:    1. Thrombocytopenia (HCC)    2. Leukopenia, unspecified type      Patient is a very pleasant 13-year-old female with a history of chronic mild leukopenia dating back for at least 3 years and 1 occurrence of mild thrombocytopenia on recent blood work. Unclear how long patient has had mild leukopenia as there are no CBCs on file prior to 2021. Patient is well-appearing with no concerning findings on exam today. She is up-to-date on all cancer screenings. I suspect she has benign, idiopathic mild leukopenia. No recurrent history of thrombocytopenia  She is asymptomatic    I have recommended a work-up to include checking basic rheum labs, HIV, hepatitis panel, B12, folate. I will repeat her CBC and also check flow cytometry on peripheral blood. I will see her back in a few weeks once labs are completed to review results and discuss additional recommendations. Patient is in agreement. I reviewed signs and symptoms of thrombocytopenia that she should report today. She verbalized understanding. She discharged call with any additional questions or concerns prior to her follow-up visit        Portions of the record may have been created with voice recognition software. Occasional wrong word or "sound a like" substitutions may have occurred due to the inherent limitations of voice recognition software. Read the chart carefully and recognize, using context, where substitutions have occurred.

## 2023-11-13 ENCOUNTER — OFFICE VISIT (OUTPATIENT)
Age: 63
End: 2023-11-13
Payer: COMMERCIAL

## 2023-11-13 ENCOUNTER — TELEPHONE (OUTPATIENT)
Age: 63
End: 2023-11-13

## 2023-11-13 ENCOUNTER — APPOINTMENT (OUTPATIENT)
Dept: LAB | Facility: HOSPITAL | Age: 63
End: 2023-11-13
Payer: COMMERCIAL

## 2023-11-13 VITALS
RESPIRATION RATE: 18 BRPM | TEMPERATURE: 97.6 F | OXYGEN SATURATION: 99 % | BODY MASS INDEX: 26.21 KG/M2 | HEIGHT: 67 IN | HEART RATE: 99 BPM | SYSTOLIC BLOOD PRESSURE: 122 MMHG | WEIGHT: 167 LBS | DIASTOLIC BLOOD PRESSURE: 81 MMHG

## 2023-11-13 DIAGNOSIS — D69.6 THROMBOCYTOPENIA (HCC): Primary | ICD-10-CM

## 2023-11-13 DIAGNOSIS — D69.6 THROMBOCYTOPENIA (HCC): ICD-10-CM

## 2023-11-13 DIAGNOSIS — D72.819 LEUKOPENIA, UNSPECIFIED TYPE: ICD-10-CM

## 2023-11-13 LAB
BASOPHILS # BLD AUTO: 0.03 THOUSANDS/ÂΜL (ref 0–0.1)
BASOPHILS NFR BLD AUTO: 1 % (ref 0–1)
CRP SERPL QL: 1.6 MG/L
EOSINOPHIL # BLD AUTO: 0.05 THOUSAND/ÂΜL (ref 0–0.61)
EOSINOPHIL NFR BLD AUTO: 1 % (ref 0–6)
ERYTHROCYTE [DISTWIDTH] IN BLOOD BY AUTOMATED COUNT: 11.9 % (ref 11.6–15.1)
ERYTHROCYTE [SEDIMENTATION RATE] IN BLOOD: 8 MM/HOUR (ref 0–29)
FOLATE SERPL-MCNC: 12 NG/ML
HBV CORE AB SER QL: NORMAL
HBV CORE IGM SER QL: NORMAL
HBV SURFACE AG SER QL: NORMAL
HCT VFR BLD AUTO: 42.8 % (ref 34.8–46.1)
HCV AB SER QL: NORMAL
HGB BLD-MCNC: 13.9 G/DL (ref 11.5–15.4)
HIV 1+2 AB+HIV1 P24 AG SERPL QL IA: NORMAL
HIV 2 AB SERPL QL IA: NORMAL
HIV1 AB SERPL QL IA: NORMAL
HIV1 P24 AG SERPL QL IA: NORMAL
IMM GRANULOCYTES # BLD AUTO: 0.02 THOUSAND/UL (ref 0–0.2)
IMM GRANULOCYTES NFR BLD AUTO: 0 % (ref 0–2)
LYMPHOCYTES # BLD AUTO: 0.8 THOUSANDS/ÂΜL (ref 0.6–4.47)
LYMPHOCYTES NFR BLD AUTO: 16 % (ref 14–44)
MCH RBC QN AUTO: 29.8 PG (ref 26.8–34.3)
MCHC RBC AUTO-ENTMCNC: 32.5 G/DL (ref 31.4–37.4)
MCV RBC AUTO: 92 FL (ref 82–98)
MONOCYTES # BLD AUTO: 0.3 THOUSAND/ÂΜL (ref 0.17–1.22)
MONOCYTES NFR BLD AUTO: 6 % (ref 4–12)
NEUTROPHILS # BLD AUTO: 3.83 THOUSANDS/ÂΜL (ref 1.85–7.62)
NEUTS SEG NFR BLD AUTO: 76 % (ref 43–75)
NRBC BLD AUTO-RTO: 0 /100 WBCS
PLATELET # BLD AUTO: 134 THOUSANDS/UL (ref 149–390)
PMV BLD AUTO: 11.3 FL (ref 8.9–12.7)
RBC # BLD AUTO: 4.66 MILLION/UL (ref 3.81–5.12)
WBC # BLD AUTO: 5.03 THOUSAND/UL (ref 4.31–10.16)

## 2023-11-13 PROCEDURE — 87389 HIV-1 AG W/HIV-1&-2 AB AG IA: CPT

## 2023-11-13 PROCEDURE — 86704 HEP B CORE ANTIBODY TOTAL: CPT

## 2023-11-13 PROCEDURE — 99204 OFFICE O/P NEW MOD 45 MIN: CPT | Performed by: NURSE PRACTITIONER

## 2023-11-13 PROCEDURE — 88184 FLOWCYTOMETRY/ TC 1 MARKER: CPT

## 2023-11-13 PROCEDURE — 82746 ASSAY OF FOLIC ACID SERUM: CPT

## 2023-11-13 PROCEDURE — 88185 FLOWCYTOMETRY/TC ADD-ON: CPT

## 2023-11-13 PROCEDURE — 86140 C-REACTIVE PROTEIN: CPT

## 2023-11-13 PROCEDURE — 86430 RHEUMATOID FACTOR TEST QUAL: CPT

## 2023-11-13 PROCEDURE — 86803 HEPATITIS C AB TEST: CPT

## 2023-11-13 PROCEDURE — 85652 RBC SED RATE AUTOMATED: CPT

## 2023-11-13 PROCEDURE — 83918 ORGANIC ACIDS TOTAL QUANT: CPT

## 2023-11-13 PROCEDURE — 36415 COLL VENOUS BLD VENIPUNCTURE: CPT

## 2023-11-13 PROCEDURE — 85025 COMPLETE CBC W/AUTO DIFF WBC: CPT

## 2023-11-13 PROCEDURE — 87340 HEPATITIS B SURFACE AG IA: CPT

## 2023-11-13 PROCEDURE — 86705 HEP B CORE ANTIBODY IGM: CPT

## 2023-11-13 NOTE — TELEPHONE ENCOUNTER
Called patient with new appt scheduled for 12/7/23 @ 12;30 pm . Left message with [facility] tel number

## 2023-11-14 LAB — RHEUMATOID FACT SER QL LA: NEGATIVE

## 2023-11-18 LAB — METHYLMALONATE SERPL-SCNC: 242 NMOL/L (ref 0–378)

## 2023-11-20 LAB — SCAN RESULT: NORMAL

## 2023-12-03 NOTE — PROGRESS NOTES
HEMATOLOGY / [address] [place] [address] FOLLOW UP NOTE    Primary Care Provider: Keily Perales  Referring Provider:    MRN: [id number]  : 1960    Reason for Encounter: Follow-up for leukopenia, thrombocytopenia       Interval History: Patient returns for follow-up to review results of workup ordered for mild leukopenia. Repeat CBC-D done on 2023 actually shows a normal white count of 5.03. Hemoglobin normal 13.9, platelets 115. Differential normal  Workup is normal.  Folate, B12 normal.  Flow cytometry on peripheral blood negative for any collating malignant cells. RF screen normal, CRP and sed rate normal, TSH normal, chronic hepatitis panel normal, HIV normal    She feels well. Denies any infections. No abnormal bleeding or bruising. No constitutional symptoms. No B symptoms. REVIEW OF SYSTEMS:  Please note that a 14-point review of systems was performed to include Constitutional, HEENT, Respiratory, CVS, GI, , Musculoskeletal, Integumentary, Neurologic, Rheumatologic, Endocrinologic, Psychiatric, Lymphatic, and Hematologic/Oncologic systems were reviewed and are negative unless otherwise stated in HPI. Positive and negative findings pertinent to this evaluation are incorporated into the history of present illness. ECOG PS: 0    PROBLEM LIST:  Patient Active Problem List   Diagnosis    Lipoma of torso    Low bone density for age    Acute right-sided low back pain with right-sided sciatica    Dextroscoliosis of lumbar spine    Degenerative disc disease at L5-S1 level    Osteopenia of neck of left femur    Post-menopausal    Protrusion of lumbar intervertebral disc    Right lumbar radiculopathy    Tinnitus of both ears       Assessment / Plan:  1. Thrombocytopenia ([address])      Patient is a very pleasant 49-year-old female with a history of chronic mild leukopenia dating back for at least 3 years and 1 occurrence of mild thrombocytopenia on recent blood work.   Unclear how long patient has had mild leukopenia as there are no CBCs on file prior to 2021. Workup ordered for evaluation of her mild cytopenias has resulted with negative findings. Repeat CBC actually demonstrates a normal white count and an isolated mild thrombocytopenia with a platelet count of 037. I suspect this is idiopathic. I reviewed potential diagnosis of ITP which is a diagnosis of exclusion. She has no other CBC abnormalities or concerns on differential.  Clinically she is well-appearing and asymptomatic. Observation is recommended. I would like to repeat blood work in 6 months. She is in agreement. I did review signs and symptoms of thrombocytopenia that she should report. She verbalized understanding. She knows to call with any questions or concerns prior to her next visit      I spent 30 minutes on chart review, face to face counseling time, coordination of care and documentation. Past Medical History:   has a past medical history of No known problems. PAST SURGICAL HISTORY:   has a past surgical history that includes Hysterectomy and Oophorectomy (Left). CURRENT MEDICATIONS  Current Outpatient Medications   Medication Sig Dispense Refill    fluticasone (FLONASE) 50 mcg/act nasal spray SPRAY 2 SPRAYS INTO EACH NOSTRIL EVERY DAY 16 mL 3     No current facility-administered medications for this visit. [unfilled]    SOCIAL HISTORY:   reports that she has never smoked. She has never used smokeless tobacco. She reports current alcohol use. She reports that she does not use drugs. FAMILY HISTORY:  family history includes Diabetes in her maternal grandmother and mother; Lymphoma (age of onset: 80) in her mother; No Known Problems in her daughter, father, maternal aunt, maternal aunt, maternal grandfather, paternal grandfather, paternal grandmother, and sister. ALLERGIES:  is allergic to pollen extract and sulfa antibiotics.       Physical Exam:  Vital Signs:   Visit Vitals  /81 (BP Location: Left arm, Patient Position: Sitting, Cuff Size: Standard)   Pulse 98   Temp (!) 97.2 °F (36.2 °C) (Temporal)   Resp 18   Ht 5' 7" (1.702 m)   Wt 76.2 kg (168 lb)   SpO2 99%   BMI 26.31 kg/m²   OB Status Hysterectomy   Smoking Status Never   BSA 1.88 m²     Body mass index is 26.31 kg/m². Body surface area is 1.88 meters squared. Physical Exam  Constitutional:       General: She is not in acute distress. Appearance: Normal appearance. HENT:      Head: Normocephalic and atraumatic. Eyes:      General: No scleral icterus. Right eye: No discharge. Left eye: No discharge. Conjunctiva/sclera: Conjunctivae normal.   Cardiovascular:      Rate and Rhythm: Normal rate and regular rhythm. Pulmonary:      Effort: Pulmonary effort is normal. No respiratory distress. Breath sounds: Normal breath sounds. Abdominal:      General: Bowel sounds are normal. There is no distension. Palpations: Abdomen is soft. There is no mass. Tenderness: There is no abdominal tenderness. Musculoskeletal:         General: Normal range of motion. Lymphadenopathy:      Cervical: No cervical adenopathy. Upper Body:      Right upper body: No supraclavicular, axillary or pectoral adenopathy. Left upper body: No supraclavicular, axillary or pectoral adenopathy. Skin:     General: Skin is warm and dry. Neurological:      General: No focal deficit present. Mental Status: She is alert and oriented to person, place, and time.    Psychiatric:         Mood and Affect: Mood normal.         Behavior: Behavior normal.         Labs:  Lab Results   Component Value Date    WBC 5.03 11/13/2023    HGB 13.9 11/13/2023    HCT 42.8 11/13/2023    MCV 92 11/13/2023     (L) 11/13/2023     Lab Results   Component Value Date     12/30/2013    SODIUM 142 09/25/2023    K 4.6 09/25/2023     09/25/2023    CO2 30 09/25/2023    ANIONGAP 4 12/30/2013    AGAP 7 09/25/2023    BUN 19 09/25/2023    CREATININE 0.65 09/25/2023    GLUF 88 09/25/2023    CALCIUM 9.3 09/25/2023    AST 16 09/25/2023    ALT 15 09/25/2023    ALKPHOS 92 09/25/2023    PROT 6.9 12/30/2013    TP 6.8 09/25/2023    BILITOT 0.6 12/30/2013    TBILI 0.69 09/25/2023    EGFR 93 09/25/2023

## 2023-12-07 ENCOUNTER — TELEPHONE (OUTPATIENT)
Age: 63
End: 2023-12-07

## 2023-12-07 ENCOUNTER — OFFICE VISIT (OUTPATIENT)
Age: 63
End: 2023-12-07
Payer: COMMERCIAL

## 2023-12-07 VITALS
HEART RATE: 98 BPM | DIASTOLIC BLOOD PRESSURE: 81 MMHG | OXYGEN SATURATION: 99 % | SYSTOLIC BLOOD PRESSURE: 134 MMHG | TEMPERATURE: 97.2 F | WEIGHT: 168 LBS | BODY MASS INDEX: 26.37 KG/M2 | RESPIRATION RATE: 18 BRPM | HEIGHT: 67 IN

## 2023-12-07 DIAGNOSIS — D69.6 THROMBOCYTOPENIA (HCC): Primary | ICD-10-CM

## 2023-12-07 PROCEDURE — 99214 OFFICE O/P EST MOD 30 MIN: CPT | Performed by: NURSE PRACTITIONER

## 2023-12-07 NOTE — TELEPHONE ENCOUNTER
Called patient to set up 6 month f/u, LABS with Wanda. Left VM. HOPELINE: WHEN PATIENT CALLS BACK, PLEASE SET HER UP WITH A 6 MO F/U WITH LABS FOR WANDA.

## 2024-03-21 ENCOUNTER — HOSPITAL ENCOUNTER (OUTPATIENT)
Dept: MAMMOGRAPHY | Facility: HOSPITAL | Age: 64
Discharge: HOME | End: 2024-03-21
Payer: COMMERCIAL

## 2024-03-21 VITALS — BODY MASS INDEX: 26.37 KG/M2 | HEIGHT: 67 IN | WEIGHT: 167.99 LBS

## 2024-03-21 DIAGNOSIS — Z12.31 ENCOUNTER FOR SCREENING MAMMOGRAM FOR BREAST CANCER: ICD-10-CM

## 2024-03-21 PROCEDURE — 77067 SCR MAMMO BI INCL CAD: CPT

## 2024-03-21 PROCEDURE — 77063 BREAST TOMOSYNTHESIS BI: CPT

## 2024-03-25 ENCOUNTER — TELEPHONE (OUTPATIENT)
Dept: FAMILY MEDICINE CLINIC | Facility: CLINIC | Age: 64
End: 2024-03-25

## 2024-03-25 NOTE — TELEPHONE ENCOUNTER
----- Message from LA Amor sent at 3/25/2024  8:43 AM EDT -----  Your mammogram was normal.  Repeat in 1 year is recommended.

## 2024-04-16 ENCOUNTER — OFFICE VISIT (OUTPATIENT)
Dept: FAMILY MEDICINE CLINIC | Facility: CLINIC | Age: 64
End: 2024-04-16
Payer: COMMERCIAL

## 2024-04-16 VITALS
TEMPERATURE: 97.5 F | DIASTOLIC BLOOD PRESSURE: 80 MMHG | SYSTOLIC BLOOD PRESSURE: 130 MMHG | HEART RATE: 83 BPM | WEIGHT: 169 LBS | BODY MASS INDEX: 26.53 KG/M2 | HEIGHT: 67 IN | OXYGEN SATURATION: 97 %

## 2024-04-16 DIAGNOSIS — Z13.0 SCREENING FOR DEFICIENCY ANEMIA: ICD-10-CM

## 2024-04-16 DIAGNOSIS — Z78.0 POST-MENOPAUSE: ICD-10-CM

## 2024-04-16 DIAGNOSIS — Z13.220 SCREENING FOR LIPID DISORDERS: ICD-10-CM

## 2024-04-16 DIAGNOSIS — L91.8 SKIN TAG: ICD-10-CM

## 2024-04-16 DIAGNOSIS — Z00.00 ANNUAL PHYSICAL EXAM: Primary | ICD-10-CM

## 2024-04-16 DIAGNOSIS — M54.41 ACUTE RIGHT-SIDED LOW BACK PAIN WITH RIGHT-SIDED SCIATICA: ICD-10-CM

## 2024-04-16 DIAGNOSIS — M54.16 RIGHT LUMBAR RADICULOPATHY: ICD-10-CM

## 2024-04-16 DIAGNOSIS — M77.8 LEFT SHOULDER TENDONITIS: ICD-10-CM

## 2024-04-16 DIAGNOSIS — L30.9 DERMATITIS: ICD-10-CM

## 2024-04-16 DIAGNOSIS — Z13.29 SCREENING FOR THYROID DISORDER: ICD-10-CM

## 2024-04-16 PROCEDURE — 99214 OFFICE O/P EST MOD 30 MIN: CPT | Performed by: NURSE PRACTITIONER

## 2024-04-16 PROCEDURE — 99396 PREV VISIT EST AGE 40-64: CPT | Performed by: NURSE PRACTITIONER

## 2024-04-16 PROCEDURE — 11200 RMVL SKIN TAGS UP TO&INC 15: CPT | Performed by: NURSE PRACTITIONER

## 2024-04-16 RX ORDER — NYSTATIN 100000 [USP'U]/G
POWDER TOPICAL 2 TIMES DAILY
Qty: 30 G | Refills: 2 | Status: SHIPPED | OUTPATIENT
Start: 2024-04-16

## 2024-04-16 NOTE — PROGRESS NOTES
ADULT ANNUAL PHYSICAL  [facility]    NAME: Reba Mirza  AGE: 64 y.o. SEX: female  : 1960     DATE: 5/10/2024     Assessment and Plan:     Problem List Items Addressed This Visit        Nervous and Auditory    Acute right-sided low back pain with right-sided sciatica    Relevant Orders    Ambulatory Referral to Physical Therapy    Right lumbar radiculopathy    Relevant Orders    Ambulatory Referral to Physical Therapy   Other Visit Diagnoses     Annual physical exam    -  Primary    Left shoulder tendonitis        Relevant Orders    Ambulatory Referral to Physical Therapy    Skin tag        Relevant Medications    nystatin (MYCOSTATIN) powder    Other Relevant Orders    Skin tag removal (Completed)    Screening for deficiency anemia        Screening for thyroid disorder        Screening for lipid disorders        Post-menopause        Relevant Orders    DXA bone density spine hip and pelvis    Dermatitis        Relevant Medications    nystatin (MYCOSTATIN) powder          Immunizations and preventive care screenings were discussed with patient today. Appropriate education was printed on patient's after visit summary.    Counseling:  Dental Health: discussed importance of regular tooth brushing, flossing, and dental visits.  Injury prevention: discussed safety/seat belts, safety helmets, smoke detectors, carbon dioxide detectors, and smoking near bedding or upholstery.  Sexual health: discussed sexually transmitted diseases, partner selection, use of condoms, avoidance of unintended pregnancy, and contraceptive alternatives.  Exercise: the importance of regular exercise/physical activity was discussed. Recommend exercise 3-5 times per week for at least 30 minutes.       Depression Screening and Follow-up Plan: Patient was screened for depression during today's encounter. They screened negative with a PHQ-2 score of 0.        No follow-ups on file.      "Chief Complaint:     Chief Complaint   Patient presents with   • Physical Exam     - Has some spots on her skin that she would like Brina to see. Dermatology related.    • Knee Pain     Right knee pain that began 2 weeks ago. Feel like sciatica but different. When she walks her knee pops of out place in a way. Currently is not painful.   • Shoulder Pain     Left shoulder pain. Some movements is causing her pain.      History of Present Illness:     Adult Annual Physical   Patient here for a comprehensive physical exam. The patient reports problems - shoulder, knee and back pain .    Left shoulder pain for last few months- only hurts with certain movements, she is right hand dominant. She can lift her arm up and over her head but she has pain but no pain at rest. Feels pain in front part of shoulder and into back of left scapula. Hasn't tried a few exercises but nothing consistent. No known injury.     Right knee and right leg are painful. Recurring pain on the inside and posterior aspect of her right thigh. But does also have pain from right buttock into lower leg tingling sensation + sensation.    A couple weeks ago started with right knee weakness feels it is popping/wobbly- no pain, jumps out of place. Not a pain. She hasn't had any falls, not a lot of pain in knee. Feels it isnt \"holding together\". No injury fall or twist.     Has a history of seeing pain management for lower back pain has had injections with improvement most recent was February 2023.     Has used electric stim - which does make it feel better temporarily.      Diet and Physical Activity  Diet/Nutrition: well balanced diet.   Exercise: walking.      Depression Screening  PHQ-2/9 Depression Screening    Little interest or pleasure in doing things: 0 - not at all  Feeling down, depressed, or hopeless: 0 - not at all  Trouble falling or staying asleep, or sleeping too much: 0 - not at all  Feeling tired or having little energy: 0 - not at " all  Poor appetite or overeatin - not at all  Feeling bad about yourself - or that you are a failure or have let yourself or your family down: 0 - not at all  Trouble concentrating on things, such as reading the newspaper or watching television: 0 - not at all  Moving or speaking so slowly that other people could have noticed. Or the opposite - being so fidgety or restless that you have been moving around a lot more than usual: 0 - not at all  Thoughts that you would be better off dead, or of hurting yourself in some way: 0 - not at all  PHQ-2 Score: 0  PHQ-2 Interpretation: Negative depression screen  PHQ-9 Score: 0  PHQ-9 Interpretation: No or Minimal depression       General Health  Sleep: sleeps well.   Hearing: normal - bilateral.  Vision: goes for regular eye exams and wears glasses.   Dental: regular dental visits.       /GYN Health  Follows with gynecology? no   Patient is: postmenopausal   Review of Systems:     Review of Systems   Constitutional: Negative.  Negative for chills and fever.   HENT: Negative.  Negative for ear pain and sore throat.    Eyes: Negative.  Negative for pain and visual disturbance.   Respiratory: Negative.  Negative for cough and shortness of breath.    Cardiovascular:  Negative for chest pain and palpitations.   Gastrointestinal: Negative.  Negative for abdominal pain and vomiting.   Genitourinary: Negative.  Negative for dysuria and hematuria.   Musculoskeletal:  Positive for arthralgias, back pain and gait problem.   Skin:  Negative for color change, pallor and rash.   Neurological:  Positive for weakness. Negative for seizures and syncope.   Psychiatric/Behavioral: Negative.     All other systems reviewed and are negative.     Past Medical History:     Past Medical History:   Diagnosis Date   • No known problems       Past Surgical History:     Past Surgical History:   Procedure Laterality Date   • HYSTERECTOMY      age 43   • OOPHORECTOMY Left     age 43      Social  History:     Social History     Socioeconomic History   • Marital status: /Civil Union     Spouse name: None   • Number of children: None   • Years of education: None   • Highest education level: None   Occupational History   • None   Tobacco Use   • Smoking status: Never   • Smokeless tobacco: Never   Vaping Use   • Vaping status: Never Used   Substance and Sexual Activity   • Alcohol use: Yes     Comment: Social   • Drug use: No   • Sexual activity: None   Other Topics Concern   • None   Social History Narrative   • None     Social Determinants of Health     Financial Resource Strain: Not on file   Food Insecurity: Not on file   Transportation Needs: Not on file   Physical Activity: Not on file   Stress: Not on file   Social Connections: Not on file   Intimate Partner Violence: Not on file   Housing Stability: Not on file      Family History:     Family History   Problem Relation Age of Onset   • Diabetes Mother         Mellitus   • Lymphoma Mother 80   • No Known Problems Father    • No Known Problems Sister    • No Known Problems Daughter    • Diabetes Maternal Grandmother         Mellitus   • No Known Problems Maternal Grandfather    • No Known Problems Paternal Grandmother    • No Known Problems Paternal Grandfather    • No Known Problems Maternal Aunt    • No Known Problems Maternal Aunt    • No Known Problems Brother    • No Known Problems Son       Current Medications:     Current Outpatient Medications   Medication Sig Dispense Refill   • nystatin (MYCOSTATIN) powder Apply topically 2 (two) times a day 30 g 2   • fluticasone (FLONASE) 50 mcg/act nasal spray 2 sprays into each nostril daily 16 mL 3     No current facility-administered medications for this visit.      Allergies:     Allergies   Allergen Reactions   • Pollen Extract    • Sulfa Antibiotics GI Intolerance      Physical Exam:     /80 (BP Location: Left arm, Patient Position: Sitting, Cuff Size: Standard)   Pulse 83   Temp 97.5 °F  "(36.4 °C) (Tympanic)   Ht 5' 7\" (1.702 m)   Wt 76.7 kg (169 lb)   SpO2 97%   BMI 26.47 kg/m²     Physical Exam  Vitals and nursing note reviewed.   Constitutional:       General: She is not in acute distress.     Appearance: Normal appearance. She is well-developed.   HENT:      Head: Normocephalic and atraumatic.      Right Ear: Tympanic membrane, ear canal and external ear normal.      Left Ear: Tympanic membrane, ear canal and external ear normal.      Nose: Nose normal.      Mouth/Throat:      Mouth: Mucous membranes are moist.      Pharynx: Oropharynx is clear.   Eyes:      Conjunctiva/sclera: Conjunctivae normal.      Pupils: Pupils are equal, round, and reactive to light.   Cardiovascular:      Rate and Rhythm: Normal rate and regular rhythm.      Heart sounds: Normal heart sounds. No murmur heard.  Pulmonary:      Effort: Pulmonary effort is normal. No respiratory distress.      Breath sounds: Normal breath sounds.   Abdominal:      General: Abdomen is flat. Bowel sounds are normal.      Palpations: Abdomen is soft.      Tenderness: There is no abdominal tenderness.   Musculoskeletal:         General: No swelling.      Left shoulder: Decreased range of motion. Normal strength.      Cervical back: Neck supple. No tenderness.      Lumbar back: Tenderness present. Decreased range of motion. Positive right straight leg raise test. Negative left straight leg raise test.      Right knee: Decreased range of motion.      Left knee: Normal range of motion.      Right lower leg: No edema.      Left lower leg: No edema.   Lymphadenopathy:      Cervical: No cervical adenopathy.   Skin:     General: Skin is warm and dry.      Capillary Refill: Capillary refill takes less than 2 seconds.      Comments: Skin tag left upper abdomen   Neurological:      Mental Status: She is alert and oriented to person, place, and time.   Psychiatric:         Mood and Affect: Mood normal.         Behavior: Behavior normal.          Skin " tag removal    Date/Time: 4/16/2024 3:30 PM    Performed by: LA Amor  Authorized by: LA Amor  Universal Protocol:  Consent: Verbal consent obtained.  Risks and benefits: risks, benefits and alternatives were discussed  Consent given by: patient  Patient understanding: patient states understanding of the procedure being performed      Procedure Details - Skin Tag Destruction:     Up to 15      Body area:  Trunk    Trunk location:  Abdomen    Initial size (mm):  1    Destruction method: scissors used for extraction      Cosmetic?: Yes        LA Amor  [facility]

## 2024-04-23 NOTE — PROGRESS NOTES
PT Evaluation     Today's date: 2024  Patient name: Reba Mirza  : 1960  MRN: [id number]  Referring provider: Brina Ramirez, *  Dx:   Encounter Diagnosis     ICD-10-CM    1. Right lumbar radiculopathy  M54.16       2. Left shoulder tendonitis  M77.8       3. Acute right-sided low back pain with right-sided sciatica  M54.41                Assessment  Assessment details: Reba Mirza is a 64 y.o. female who presents with pain, decreased strength, decreased ROM, decreased joint mobility, and ambulatory dysfunction. Due to these impairments, patient has difficulty performing ADL's, recreational activities, work-related activities, engaging in social activities, ambulation, stair negotiation, lifting/carrying, transfers. Patient's clinical presentation is consistent with their referring diagnosis of Right lumbar radiculopathy  (primary encounter diagnosis), Left shoulder tendonitis, Acute right-sided low back pain with right-sided sciatica. Patient has been educated in home exercise program and plan of care. Patient would benefit from skilled physical therapy services to address their aforementioned functional limitations and progress towards prior level of function and independence with home exercise program.     Impairments: abnormal gait, abnormal or restricted ROM, activity intolerance, impaired balance, impaired physical strength, lacks appropriate home exercise program, pain with function, poor posture  and poor body mechanics  Functional limitations: getting out of bed, prolonged sitting/stand, carrying, lift, push/pull   Prognosis: good  Prognosis details: + factors: high motivation levels  - factors: age    Goals  Short term goals to be accomplished by 24:  STG 1: Pt will demo independence with postural management and I with HEP to maximize progress between therapy sessions.  STG 2: Pt will demo L/S AROM < or = min loss throughout to promote improved functional mobility and body  mechanics.  STG 3: Pt will reports pain dec freq and intensity 50%.  STG 4: Pt will deny sleep disturbance.    Long term goals to be accomplished by 24  LTG 1: Pt will be able to sit at work for 1 hour without radicular pain.  LTG 2: Pt will be able to walk for 1 hour without pain.  LTG 3: Pt will be able to go down stairs without instability or pain.       Plan  Plan details: HEP development, stretching, strengthening, A/AA/PROM, joint mobilizations, posture education, STM/MI as needed to reduce muscle tension, muscle reeducation, PLOC discussed and agreed upon with patient.      Patient would benefit from: PT eval and skilled physical therapy  Planned modality interventions: cryotherapy, thermotherapy: hydrocollator packs and unattended electrical stimulation  Planned therapy interventions: manual therapy, neuromuscular re-education, self care, therapeutic activities, therapeutic exercise, home exercise program, joint mobilization, balance, patient education, stretching, strengthening and flexibility  Frequency: 2x week  Duration in weeks: 12  Plan of Care beginning date: 2024  Plan of Care expiration date: 2024  Treatment plan discussed with: patient        Subjective Evaluation    History of Present Illness  Mechanism of injury: Pt is a 63 y/o female who presents to PT with R side lumbar pain with sciatica and L shoulder pain.   Shoulder: Pain began a couple months ago. Notes pain only present with overhead lift/taking off shirt.    Lumbar: Pain began about 1-2 months ago. Insidious onset. Some numbness/tingling. Pain resolved with laying supine. Pain provoked with sitting, standing, walking.     Patient Goals  Patient goals for therapy: decreased pain, increased motion, increased strength and independence with ADLs/IADLs  Patient goal: getting out of bed, prolonged sitting/stand, carrying, lift, push/pull  Pain  Current pain ratin  At best pain ratin  At worst pain ratin  Location: R  lumbar/scaitica extending into R posterior knee  Quality: radiating  Aggravating factors: standing, walking, stair climbing, overhead activity and lifting    Treatments  Current treatment: physical therapy        Objective     Concurrent Complaints  Negative for night pain, disturbed sleep, bladder dysfunction, bowel dysfunction and saddle (S4) numbness    Postural Observations  Seated posture: fair  Standing posture: fair  Correction of posture: has no consistent effect      Tenderness     Additional Tenderness Details  L RTC muscle belly   No TTP     Active Range of Motion   Cervical/Thoracic Spine       Thoracic    Extension:  Restriction level: moderate  Left Shoulder   Flexion: WFL    Lumbar   Extension:  Restriction level: moderate  Mechanical Assessment    Cervical      Thoracic      Lumbar    Standing extension: repeated movements  Pain location: centralized  Pain intensity: better  Pain level: decreased  Lying extension: repeated movements  Pain location: centralized    Strength/Myotome Testing     Left Shoulder     Planes of Motion   Flexion: 4-     Lumbar   Left   Heel walk: normal  Toe walk: normal    Right   Heel walk: normal  Toe walk: normal    Left Hip   Planes of Motion   Flexion: 5    Right Hip   Planes of Motion   Flexion: 4    Left Knee   Flexion: 5  Extension: 5    Right Knee   Flexion: 4  Extension: 4    Left Ankle/Foot   Dorsiflexion: 5    Right Ankle/Foot   Dorsiflexion: 4    Tests     Left Shoulder   Negative drop arm.     Lumbar     Left   Negative passive SLR, quadrant and slump test.     Right   Positive passive SLR and slump test.   Negative quadrant.        Precautions:   Past Medical History:   Diagnosis Date    No known problems          Manuals 4/24                                            Neuro Re-Ed 4/24                                                                       Ther Ex 4/24        ANTONY counter 2x10        REIL  2x10        Seated bent knee hamstring stretch  :30x2         Supine active HS stretch 2x10                                             Ther Activity 4/24

## 2024-04-24 ENCOUNTER — EVALUATION (OUTPATIENT)
Dept: PHYSICAL THERAPY | Facility: CLINIC | Age: 64
End: 2024-04-24
Payer: COMMERCIAL

## 2024-04-24 DIAGNOSIS — M54.41 ACUTE RIGHT-SIDED LOW BACK PAIN WITH RIGHT-SIDED SCIATICA: ICD-10-CM

## 2024-04-24 DIAGNOSIS — M77.8 LEFT SHOULDER TENDONITIS: ICD-10-CM

## 2024-04-24 DIAGNOSIS — M54.16 RIGHT LUMBAR RADICULOPATHY: Primary | ICD-10-CM

## 2024-04-24 PROCEDURE — 97162 PT EVAL MOD COMPLEX 30 MIN: CPT | Performed by: PHYSICAL THERAPIST

## 2024-04-24 PROCEDURE — 97110 THERAPEUTIC EXERCISES: CPT | Performed by: PHYSICAL THERAPIST

## 2024-05-01 ENCOUNTER — OFFICE VISIT (OUTPATIENT)
Dept: PHYSICAL THERAPY | Facility: CLINIC | Age: 64
End: 2024-05-01
Payer: COMMERCIAL

## 2024-05-01 DIAGNOSIS — M77.8 LEFT SHOULDER TENDONITIS: ICD-10-CM

## 2024-05-01 DIAGNOSIS — M54.16 RIGHT LUMBAR RADICULOPATHY: Primary | ICD-10-CM

## 2024-05-01 DIAGNOSIS — M54.41 ACUTE RIGHT-SIDED LOW BACK PAIN WITH RIGHT-SIDED SCIATICA: ICD-10-CM

## 2024-05-01 PROCEDURE — 97140 MANUAL THERAPY 1/> REGIONS: CPT | Performed by: PHYSICAL THERAPIST

## 2024-05-01 PROCEDURE — 97530 THERAPEUTIC ACTIVITIES: CPT | Performed by: PHYSICAL THERAPIST

## 2024-05-01 PROCEDURE — 97110 THERAPEUTIC EXERCISES: CPT | Performed by: PHYSICAL THERAPIST

## 2024-05-01 NOTE — PROGRESS NOTES
"Daily Note     Today's date: 2024  Patient name: Reba Mirza  : 1960  MRN: [id number]  Referring provider: Brina Ramirez, *  Dx:   Encounter Diagnosis     ICD-10-CM    1. Right lumbar radiculopathy  M54.16       2. Left shoulder tendonitis  M77.8       3. Acute right-sided low back pain with right-sided sciatica  M54.41              Subjective: Pt reported that she has been having some buckling occasionally in her R knee.     Objective: See treatment diary below    Assessment: Tolerated treatment well. Patient demonstrated fatigue post treatment, exhibited good technique with therapeutic exercises, and would benefit from continued PT    Plan: Continue per plan of care.      Precautions:   Past Medical History:   Diagnosis Date    No known problems          Manuals        R HS, ITB stretch  JZ                                  Neuro Re-Ed                                                                       Ther Ex        ANTONY counter 2x10 2x10        REIL  2x10        Seated bent knee hamstring stretch  :30x2        Supine active HS stretch 2x10         U/l heel raise  3x10 ea (R weaker than L)       Calf stretch step  :30x       STS  10x        Supine strap ITB stretch  :20x3                 Ther Activity        Forward step down  4\" 3x10 ea       U/l calf press  Seat7  30/ 3x10 ea       Step up   8\" 10x ea                                                         "

## 2024-05-02 DIAGNOSIS — J30.9 ALLERGIC RHINITIS, UNSPECIFIED: ICD-10-CM

## 2024-05-03 RX ORDER — FLUTICASONE PROPIONATE 50 MCG
2 SPRAY, SUSPENSION (ML) NASAL DAILY
Qty: 16 ML | Refills: 3 | Status: SHIPPED | OUTPATIENT
Start: 2024-05-03

## 2024-05-06 ENCOUNTER — OFFICE VISIT (OUTPATIENT)
Dept: PHYSICAL THERAPY | Facility: CLINIC | Age: 64
End: 2024-05-06
Payer: COMMERCIAL

## 2024-05-06 DIAGNOSIS — M54.16 RIGHT LUMBAR RADICULOPATHY: Primary | ICD-10-CM

## 2024-05-06 DIAGNOSIS — M77.8 LEFT SHOULDER TENDONITIS: ICD-10-CM

## 2024-05-06 DIAGNOSIS — M54.41 ACUTE RIGHT-SIDED LOW BACK PAIN WITH RIGHT-SIDED SCIATICA: ICD-10-CM

## 2024-05-06 PROCEDURE — 97530 THERAPEUTIC ACTIVITIES: CPT | Performed by: PHYSICAL THERAPIST

## 2024-05-06 PROCEDURE — 97110 THERAPEUTIC EXERCISES: CPT

## 2024-05-06 NOTE — PROGRESS NOTES
"Daily Note     Today's date: 2024  Patient name: Reba Mirza  : 1960  MRN: [id number]  Referring provider: Brian Ramirez, *  Dx:   Encounter Diagnosis     ICD-10-CM    1. Right lumbar radiculopathy  M54.16       2. Left shoulder tendonitis  M77.8       3. Acute right-sided low back pain with right-sided sciatica  M54.41              Subjective: Reba reports she continues to have weakness throughout RLE with occasional knee buckling. Notes \"sometimes it is scary\".     Objective: See treatment diary below    Assessment: Reba tolerated PT treatment well. Passive stretching performed to R HS and ITB, significant limitation with passive ROM. She performed prescribed exercises without symptom aggravation, challenged with current POC. Pt would benefit from continued PT to further address impairments and maximize functional level.      Plan: Continue per plan of care.      Precautions:   Past Medical History:   Diagnosis Date    No known problems          Manuals       R HS, ITB stretch  BEATRICE RAMOS                                 Neuro Re-Ed                                                                      Ther Ex       ANTONY counter 2x10 2x10  3x10       REIL  2x10        Seated bent knee hamstring stretch  :30x2        Supine active HS stretch 2x10         U/l heel raise  3x10 ea (R weaker than L) 3x10 ea       Calf stretch step  :30x :30x3       STS  10x  2x10       Supine strap ITB stretch  :20x3  :30x3                Ther Activity 6      Forward step down  4\" 3x10 ea 4\" 3x10 ea      U/l calf press  Seat7  30/ 3x10 ea Seat7  30/ 3x10 ea      Step up   8\" 10x ea 8\" 2x10                                                         "

## 2024-05-08 ENCOUNTER — OFFICE VISIT (OUTPATIENT)
Dept: PHYSICAL THERAPY | Facility: CLINIC | Age: 64
End: 2024-05-08
Payer: COMMERCIAL

## 2024-05-08 DIAGNOSIS — M54.16 RIGHT LUMBAR RADICULOPATHY: Primary | ICD-10-CM

## 2024-05-08 DIAGNOSIS — M54.41 ACUTE RIGHT-SIDED LOW BACK PAIN WITH RIGHT-SIDED SCIATICA: ICD-10-CM

## 2024-05-08 DIAGNOSIS — M77.8 LEFT SHOULDER TENDONITIS: ICD-10-CM

## 2024-05-08 PROCEDURE — 97530 THERAPEUTIC ACTIVITIES: CPT | Performed by: PHYSICAL THERAPIST

## 2024-05-08 PROCEDURE — 97110 THERAPEUTIC EXERCISES: CPT | Performed by: PHYSICAL THERAPIST

## 2024-05-08 PROCEDURE — 97112 NEUROMUSCULAR REEDUCATION: CPT | Performed by: PHYSICAL THERAPIST

## 2024-05-08 NOTE — PROGRESS NOTES
"Daily Note     Today's date: 2024  Patient name: Reba Mirza  : 1960  MRN: [id number]  Referring provider: Brina Ramirez, *  Dx:   Encounter Diagnosis     ICD-10-CM    1. Right lumbar radiculopathy  M54.16       2. Left shoulder tendonitis  M77.8       3. Acute right-sided low back pain with right-sided sciatica  M54.41              Subjective: Pt reported that she has been improving with her hamstring flexibility and pain.     Objective: See treatment diary below    Assessment: Tolerated treatment well. Patient demonstrated fatigue post treatment, exhibited good technique with therapeutic exercises, and would benefit from continued PT    Plan: Continue per plan of care.      Precautions:   Past Medical History:   Diagnosis Date    No known problems          Manuals      R HS, ITB stretch  JZ JW JZ     STM     Lateral HS, calf                       Neuro Re-Ed      Scap retract w/ sh ER    3x10      Horizontal abduction shoulder stretch    :30x3 ea     Chicken wings    3x10                                          Ther Ex      ANTONY counter 2x10 2x10  3x10       REIL  2x10        Seated bent knee hamstring stretch  :30x2        Supine active HS stretch 2x10         U/l heel raise  3x10 ea (R weaker than L) 3x10 ea  3x10 ea     Calf stretch step  :30x :30x3  :30x3 ea     STS  10x  2x10       Supine strap ITB stretch  :20x3  :30x3  :30x3     Supine HS stretch strap    :30x3      S/l hip abduction                  Ther Activity      Forward step down  4\" 3x10 ea 4\" 3x10 ea      U/l calf press  Seat7  30/ 3x10 ea Seat7  30/ 3x10 ea Seat7  20/ 3x10 ea     Step up   8\" 10x ea 8\" 2x10       5'     5' lvl3                                                 "

## 2024-05-13 ENCOUNTER — OFFICE VISIT (OUTPATIENT)
Dept: PHYSICAL THERAPY | Facility: CLINIC | Age: 64
End: 2024-05-13
Payer: COMMERCIAL

## 2024-05-13 DIAGNOSIS — M54.41 ACUTE RIGHT-SIDED LOW BACK PAIN WITH RIGHT-SIDED SCIATICA: ICD-10-CM

## 2024-05-13 DIAGNOSIS — M54.16 RIGHT LUMBAR RADICULOPATHY: Primary | ICD-10-CM

## 2024-05-13 DIAGNOSIS — M77.8 LEFT SHOULDER TENDONITIS: ICD-10-CM

## 2024-05-13 PROCEDURE — 97140 MANUAL THERAPY 1/> REGIONS: CPT | Performed by: PHYSICAL THERAPIST

## 2024-05-13 PROCEDURE — 97110 THERAPEUTIC EXERCISES: CPT | Performed by: PHYSICAL THERAPIST

## 2024-05-13 PROCEDURE — 97530 THERAPEUTIC ACTIVITIES: CPT | Performed by: PHYSICAL THERAPIST

## 2024-05-13 NOTE — PROGRESS NOTES
"Daily Note     Today's date: 2024  Patient name: Reba Mirza  : 1960  MRN: [id number]  Referring provider: Brina Ramirez, *  Dx:   Encounter Diagnosis     ICD-10-CM    1. Right lumbar radiculopathy  M54.16       2. Left shoulder tendonitis  M77.8       3. Acute right-sided low back pain with right-sided sciatica  M54.41              Subjective: Pt reported that \"some days I feel better, some days I haven't felt improvement.\"     Objective: See treatment diary below    Assessment: Tolerated treatment well. Patient demonstrated fatigue post treatment, exhibited good technique with therapeutic exercises, and would benefit from continued PT    Plan: Continue per plan of care.      Precautions:   Past Medical History:   Diagnosis Date    No known problems          Manuals     R HS, ITB stretch  JZ JW JZ JZ    STM     Lateral HS, calf R medial calf                       Neuro Re-Ed     Scap retract w/ sh ER    3x10      Horizontal abduction shoulder stretch    :30x3 ea     Chicken wings    3x10                                          Ther Ex     ANTONY counter 2x10 2x10  3x10   2x10     REIL  2x10        Seated bent knee hamstring stretch  :30x2        Supine active HS stretch 2x10         U/l heel raise  3x10 ea (R weaker than L) 3x10 ea  3x10 ea 2x10 ea    Heel raise step b/l     3x10     Calf stretch step  :30x :30x3  :30x3 ea :30x3 ea    Single knee to chest     :10x5     Supine strap ITB stretch  :20x3  :30x3  :30x3     Supine HS stretch strap    :30x3      S/l hip abduction     :10x5    Iwona pose          Standing active HS stretch     2x10              Ther Activity     Forward step down  4\" 3x10 ea 4\" 3x10 ea  4\" 3x10 ea    U/l calf press  Seat7  30/ 3x10 ea Seat7  30/ 3x10 ea Seat7  20/ 3x10 ea Seat7  20/ 3x10 ea    Step up   8\" 10x ea 8\" 2x10       5'     5' lvl3  5'                                       "

## 2024-05-15 ENCOUNTER — OFFICE VISIT (OUTPATIENT)
Dept: PHYSICAL THERAPY | Facility: CLINIC | Age: 64
End: 2024-05-15
Payer: COMMERCIAL

## 2024-05-15 DIAGNOSIS — M54.16 RIGHT LUMBAR RADICULOPATHY: Primary | ICD-10-CM

## 2024-05-15 DIAGNOSIS — M54.41 ACUTE RIGHT-SIDED LOW BACK PAIN WITH RIGHT-SIDED SCIATICA: ICD-10-CM

## 2024-05-15 DIAGNOSIS — M77.8 LEFT SHOULDER TENDONITIS: ICD-10-CM

## 2024-05-15 PROCEDURE — 97140 MANUAL THERAPY 1/> REGIONS: CPT

## 2024-05-15 PROCEDURE — 97530 THERAPEUTIC ACTIVITIES: CPT

## 2024-05-15 PROCEDURE — 97110 THERAPEUTIC EXERCISES: CPT

## 2024-05-15 NOTE — PROGRESS NOTES
"Daily Note     Today's date: 5/15/2024  Patient name: Reba Mirza  : 1960  MRN: [id number]  Referring provider: Brina Ramirez, *  Dx:   Encounter Diagnosis     ICD-10-CM    1. Right lumbar radiculopathy  M54.16       2. Left shoulder tendonitis  M77.8       3. Acute right-sided low back pain with right-sided sciatica  M54.41              Subjective: Reba reports she has not had \"sharp/jabbing\" pain in RLE since LV, but does feel \"dull ache\" throughout hamstring.     Objective: See treatment diary below    Assessment: Reba tolerated PT treatment well. Initiated session with RB for hip/knee ROM and functional warm up. STM focusing along distal hamstring/adductor. TTP and edema present at popliteal fossa. Neural tension observed with R hamstring and gastroc stretch. Fatigues fairly quickly with PF strengthening. Pt would benefit from continued PT to further address impairments and maximize functional level.      Plan: Continue per plan of care.      Precautions:   Past Medical History:   Diagnosis Date    No known problems          Manuals 4/24 5/1 5/6 5/8 5/13 5/15   R HS, ITB stretch  JZ JW JZ JZ JW   STM     Lateral HS, calf R medial calf  JW                      Neuro Re-Ed 4/24 5/1 5/6 5/8 5/13 5/15   Scap retract w/ sh ER    3x10      Horizontal abduction shoulder stretch    :30x3 ea     Chicken wings    3x10                                          Ther Ex /6 5/8 5/13 5/15   ANTONY counter 2x10 2x10  3x10   2x10  3x10    REIL  2x10        Seated bent knee hamstring stretch  :30x2        Supine active HS stretch 2x10         U/l heel raise  3x10 ea (R weaker than L) 3x10 ea  3x10 ea 2x10 ea 3x10 ea    Heel raise step b/l     3x10  3x10   Calf stretch step  :30x :30x3  :30x3 ea :30x3 ea :30x3 ea            Single knee to chest     :10x5     Supine strap ITB stretch  :20x3  :30x3  :30x3     Supine HS stretch strap    :30x3      S/l hip abduction     :10x5 2x10 ea    Iwona pose        " "  Standing active HS stretch     2x10  2x10            Ther Activity 4/24 5/1 5/6 5/8 5/13 5/15   Forward step down  4\" 3x10 ea 4\" 3x10 ea  4\" 3x10 ea 4\" 3x10 ea   U/l calf press  Seat7  30/ 3x10 ea Seat7  30/ 3x10 ea Seat7  20/ 3x10 ea Seat7  20/ 3x10 ea Seat7  20/ 3x10 ea   Step up   8\" 10x ea 8\" 2x10       RB    5' lvl3  5'  Lvl 3 5'                                                 "

## 2024-05-20 ENCOUNTER — OFFICE VISIT (OUTPATIENT)
Dept: PHYSICAL THERAPY | Facility: CLINIC | Age: 64
End: 2024-05-20
Payer: COMMERCIAL

## 2024-05-20 DIAGNOSIS — M77.8 LEFT SHOULDER TENDONITIS: ICD-10-CM

## 2024-05-20 DIAGNOSIS — M54.41 ACUTE RIGHT-SIDED LOW BACK PAIN WITH RIGHT-SIDED SCIATICA: ICD-10-CM

## 2024-05-20 DIAGNOSIS — M54.16 RIGHT LUMBAR RADICULOPATHY: Primary | ICD-10-CM

## 2024-05-20 PROCEDURE — 97110 THERAPEUTIC EXERCISES: CPT

## 2024-05-20 PROCEDURE — 97530 THERAPEUTIC ACTIVITIES: CPT

## 2024-05-20 PROCEDURE — 97140 MANUAL THERAPY 1/> REGIONS: CPT

## 2024-05-20 NOTE — PROGRESS NOTES
"Daily Note     Today's date: 2024  Patient name: Reba Mirza  : 1960  MRN: [id number]  Referring provider: Brina Ramirez, *  Dx:   Encounter Diagnosis     ICD-10-CM    1. Right lumbar radiculopathy  M54.16       2. Left shoulder tendonitis  M77.8       3. Acute right-sided low back pain with right-sided sciatica  M54.41              Subjective: Reba reports this week radicular symptoms have moved from R calf up to hamstring region. Notes she has \"good days and bad days\".     Objective: See treatment diary below    Assessment: Reba tolerated PT treatment well. STM focusing along distal hamstring/adductor, less palpable tone and tenderness noted today. She continue to display significant neral tension with hamstring/gastroc stretching in RLE. Pt would benefit from continued PT to further address impairments and maximize functional level.      Plan: Continue per plan of care.      Precautions:   Past Medical History:   Diagnosis Date    No known problems          Manuals 5/20  5/6 5/8 5/13 5/15   R HS, ITB stretch JW  JW JZ JZ JW   [facility]  JW   Lateral HS, calf R medial calf  JW                      Neuro Re-Ed 5/20  5/6 5/8 5/13 5/15   Scap retract w/ sh ER    3x10      Horizontal abduction shoulder stretch    :30x3 ea     Chicken wings    3x10                                          Ther Ex 5/20  5/6 5/8 5/13 5/15   ANTONY counter 3x10  3x10   2x10  3x10    REIL          Seated bent knee hamstring stretch          Supine active HS stretch X10 ea         U/l heel raise 3x10 ea   3x10 ea  3x10 ea 2x10 ea 3x10 ea    Heel raise step b/l 3x10     3x10  3x10   Calf stretch step :30x3 ea   :30x3  :30x3 ea :30x3 ea :30x3 ea            Single knee to chest     :10x5     Supine strap ITB stretch   :30x3  :30x3     Supine HS stretch strap    :30x3      S/l hip abduction     :10x5 2x10 ea    Iwona pose          Standing active HS stretch 2x10     2x10  2x10            Ther Activity 5/20  5/6 5/8 5/13 5/15 " "  Forward step down 4\" 3x10 ea  4\" 3x10 ea  4\" 3x10 ea 4\" 3x10 ea   U/l calf press Seat7  20/ 3x10 ea  Seat7  30/ 3x10 ea Seat7  20/ 3x10 ea Seat7  20/ 3x10 ea Seat7  20/ 3x10 ea   Step up    8\" 2x10       RB Lvl 3 5'    5' lvl3  5'  Lvl 3 5'                                                 "

## 2024-05-22 ENCOUNTER — OFFICE VISIT (OUTPATIENT)
Dept: PHYSICAL THERAPY | Facility: CLINIC | Age: 64
End: 2024-05-22
Payer: COMMERCIAL

## 2024-05-22 DIAGNOSIS — M54.41 ACUTE RIGHT-SIDED LOW BACK PAIN WITH RIGHT-SIDED SCIATICA: ICD-10-CM

## 2024-05-22 DIAGNOSIS — M54.16 RIGHT LUMBAR RADICULOPATHY: Primary | ICD-10-CM

## 2024-05-22 DIAGNOSIS — M77.8 LEFT SHOULDER TENDONITIS: ICD-10-CM

## 2024-05-22 PROCEDURE — 97110 THERAPEUTIC EXERCISES: CPT

## 2024-05-22 PROCEDURE — 97140 MANUAL THERAPY 1/> REGIONS: CPT

## 2024-05-22 PROCEDURE — 97530 THERAPEUTIC ACTIVITIES: CPT

## 2024-05-22 NOTE — PROGRESS NOTES
Daily Note     Today's date: 2024  Patient name: Reba Mirza  : 1960  MRN: [id number]  Referring provider: Brina Ramirez, *  Dx:   Encounter Diagnosis     ICD-10-CM    1. Right lumbar radiculopathy  M54.16       2. Left shoulder tendonitis  M77.8       3. Acute right-sided low back pain with right-sided sciatica  M54.41              Subjective: Reba reports no changes in R radicular symptoms since LV. Notes she is having challenge finding a comfortable position when sitting despite repositioning of RLE.      Objective: See treatment diary below    Assessment: Reba tolerated PT treatment well. STM focusing along distal hamstring to address tissue tone. She continue to display significant neral tension with hamstring/gastroc stretching in RLE. Responds favorably to REIL, added to HEP. Pt would benefit from continued PT to further address impairments and maximize functional level.      Plan: Continue per plan of care.      Precautions:   Past Medical History:   Diagnosis Date    No known problems          Manuals 5/20 5/22  5/8 5/13 5/15   R HS, ITB stretch JW JW  JZ JZ JW   STM  JW JW  Lateral HS, calf R medial calf  JW                      Neuro Re-Ed 5/20 5/22  5/8 5/13 5/15   Scap retract w/ sh ER    3x10      Horizontal abduction shoulder stretch    :30x3 ea     Chicken wings    3x10                                          Ther Ex 5/20 5/22  5/8 5/13 5/15   ANTONY counter 3x10    2x10  3x10    REIL   3x10 HEP       Seated bent knee hamstring stretch          Supine active HS stretch X10 ea  X10 ea        U/l heel raise 3x10 ea  3x10 ea   3x10 ea 2x10 ea 3x10 ea    Heel raise step b/l 3x10  3x10    3x10  3x10   Calf stretch step :30x3 ea  :30x3 ea   :30x3 ea :30x3 ea :30x3 ea            Single knee to chest     :10x5     Supine strap ITB stretch    :30x3     Supine HS stretch strap    :30x3      S/l hip abduction     :10x5 2x10 ea    Iwona pose          Standing active HS stretch 2x10  2x10   "  2x10  2x10            Ther Activity 5/20 5/22  5/8 5/13 5/15   Forward step down 4\" 3x10 ea 4\" 3x10 ea   4\" 3x10 ea 4\" 3x10 ea   U/l calf press Seat7  20/ 3x10 ea Seat7  20/ 3x10 ea  Seat7  20/ 3x10 ea Seat7  20/ 3x10 ea Seat7  20/ 3x10 ea   Step up          RB Lvl 3 5'  Lvl 3 5'   5' lvl3  5'  Lvl 3 5'                                                 "

## 2024-05-28 ENCOUNTER — OFFICE VISIT (OUTPATIENT)
Dept: DERMATOLOGY | Facility: CLINIC | Age: 64
End: 2024-05-28
Payer: COMMERCIAL

## 2024-05-28 DIAGNOSIS — H01.119 CONTACT DERMATITIS OF EYELID, UNSPECIFIED LATERALITY: ICD-10-CM

## 2024-05-28 DIAGNOSIS — L30.4 INTERTRIGO: Primary | ICD-10-CM

## 2024-05-28 PROCEDURE — 99204 OFFICE O/P NEW MOD 45 MIN: CPT | Performed by: DERMATOLOGY

## 2024-05-28 RX ORDER — ERYTHROMYCIN 5 MG/G
0.5 OINTMENT OPHTHALMIC
Qty: 3.5 G | Refills: 0 | Status: SHIPPED | OUTPATIENT
Start: 2024-05-28

## 2024-05-28 RX ORDER — TRIAMCINOLONE ACETONIDE 1 MG/G
CREAM TOPICAL 2 TIMES DAILY
Qty: 15 G | Refills: 2 | Status: SHIPPED | OUTPATIENT
Start: 2024-05-28

## 2024-05-28 NOTE — PROGRESS NOTES
"[facility] Dermatology Clinic Note     Patient Name: Reba Mirza  Encounter Date: 5/28/2024     Have you been cared for by a [facility] Dermatologist in the last 3 years and, if so, which description applies to you?    NO.   I am considered a \"new\" patient and must complete all patient intake questions. I am FEMALE/of child-bearing potential.    REVIEW OF SYSTEMS:  Have you recently had or currently have any of the following? Recent fever or chills? No  Any non-healing wound? No  Are you pregnant or planning to become pregnant? No  Are you currently or planning to be nursing or breast feeding? No   PAST MEDICAL HISTORY:  Have you personally ever had or currently have any of the following?  If \"YES,\" then please provide more detail. Skin cancer (such as Melanoma, Basal Cell Carcinoma, Squamous Cell Carcinoma?  No  Tuberculosis, HIV/AIDS, Hepatitis B or C: No  Radiation Treatment No   HISTORY OF IMMUNOSUPPRESSION:   Do you have a history of any of the following:  Systemic Immunosuppression such as Diabetes, Biologic or Immunotherapy, Chemotherapy, Organ Transplantation, Bone Marrow Transplantation?  No    Answering \"YES\" requires the addition of the dotphrase \"IMMUNOSUPPRESSED\" as the first diagnosis of the patient's visit.   FAMILY HISTORY:  Any \"first degree relatives\" (parent, brother, sister, or child) with the following?    Skin Cancer, Pancreatic or Other Cancer? YES, mother - lymphoma   PATIENT EXPERIENCE:    Do you want the Dermatologist to perform a COMPLETE skin exam today including a clinical examination under the \"bra and underwear\" areas?  NO  If necessary, do we have your permission to call and leave a detailed message on your Preferred Phone number that includes your specific medical information?  Yes      Allergies   Allergen Reactions   • Pollen Extract    • Sulfa Antibiotics GI Intolerance      Current Outpatient Medications:   •  erythromycin (ILOTYCIN) ophthalmic ointment, Administer 0.5 inches to " both eyes daily at bedtime, Disp: 3.5 g, Rfl: 0  •  fluticasone (FLONASE) 50 mcg/act nasal spray, 2 sprays into each nostril daily, Disp: 16 mL, Rfl: 3  •  nystatin (MYCOSTATIN) powder, Apply topically 2 (two) times a day, Disp: 30 g, Rfl: 2  •  triamcinolone (KENALOG) 0.1 % cream, Apply topically 2 (two) times a day Apply to affected area under the breasts, Disp: 15 g, Rfl: 2        Whom besides the patient is providing clinical information about today's encounter?   NO ADDITIONAL HISTORIAN (patient alone provided history)    Physical Exam and Assessment/Plan by Diagnosis:    EYELID DERMATITIS, intertriginous like    Physical Exam:  Anatomic Location Affected:  lateral canthi  Morphological Description:  erythematous plaques; excoriations     Additional History of Present Condition:  patient reports she was advised to use vaseline around the eyes.     Assessment and Plan:  Based on a thorough discussion of this condition and the management approach to it (including a comprehensive discussion of the known risks, side effects and potential benefits of treatment), the patient (family) agrees to implement the following specific plan:    Reviewed gentle skin care in detail (no hot showers, limited soap usage to armpits, private area and feet, no washcloth, gentle pat dry with towel following shower, moisturizing with creams, ointments- not lotions). I recommend the La Roche Posay Lipikar Balm AP+ Moisturizing Cream     Start erythromycin 0.5% ophthalmic ointment as directed  Discussed to hold off on makeup around the eyes for some time to let the skin heal.  Patient will contact her Ophthalmologist for further treatment    INTERTRIGO    Physical Exam:  Anatomic Location Affected:  breast folds  Morphological Description:  quiet today  Pertinent Positives:  Pertinent Negatives:    Additional History of Present Condition:  patient was given nystatin powder that she notes seems to help well.    Assessment and Plan:  Based  on a thorough discussion of this condition and the management approach to it (including a comprehensive discussion of the known risks, side effects and potential benefits of treatment), the patient (family) agrees to implement the following specific plan:  Start Triamcinolone 0.1% cream BID up to 2 weeks then as needed for flares, counseled not to use indefinitely as can cause atrophy, dyspigmentation    Assessment and Plan  Intertrigo describes a rash in the flexures or body folds, such as behind the ears, in the folds of the neck, under the arms (axillae), under a protruding abdomen, in the groin, between the buttocks, in the finger webs or toe spaces.  Although intertrigo may affect one skin fold, it is common for it to involve multiple sites.    Intertrigo can affect males and females of any age. It is particularly common in people that are overweight or obese (see metabolic syndrome). Other contributing factors are:  Genetic tendency to skin disease  Hyperhidrosis (excessive sweating)    Intertrigo can be acute (recent onset), relapsing (recurrent), or chronic (present for more than 6 weeks). The exact appearance and behaviour depends on the underlying cause or causes.  The skin affected by intertrigo is inflamed, ie reddened and uncomfortable. It may become moist and macerated, leading to fissuring (cracks) and peeling.  Intertrigo is due to genetic and environmental factors.  Flexural skin has relatively high surface temperature  Moisture from insensible water loss and sweating cannot evaporate due to occlusion.  Friction from movement of adjacent skin results in chafing.    The microorganisms that are normally resident on flexural skin, the microbiome, include corynebacteria, other bacteria and yeasts. These multiply in warm moist environments and may cause disease.    We can classify intertrigo into infectious and inflammatory origin but there is often overlap.  Infections tend to be unilateral and  asymmetrical.  Inflammatory disorders tend to be symmetrical affecting armpits, groins, under the breasts and the abdominal folds, except atopic dermatitis, which more often arises on the neck, and in elbow and knee creases.    Investigations may be necessary to determine the cause of intertrigo.  A swab for microscopy and culture of bacteria (microbiology)  A scraping for microscopy and culture of fungi (mycology)  A skin biopsy may be performed for histopathology if the skin condition is unusual or fails to respond to treatment.    What is the treatment for intertrigo?  Treatment depends on the underlying cause, if identified, and on which micro-organisms are present in the rash. Combinations are common.  Sweating may be reduced with a gentle antiperspirant.  Physical exertion should be followed by a bath and completely drying the skin folds using a hair dryer on cool setting, soft towel and/or corn starch powder.  Triple paste contains petrolatum, zinc oxide, and aluminum acetate solution to reduce friction, irritation and sweating.  Bacteria may be treated with topical antibiotics such as fusidic acid cream, mupirocin ointment, or oral antibiotics such as flucloxacillin and erythromycin.  Yeasts and fungi may be treated with topical antifungals such as clotrimazole and terbinafine cream or oral antifungal agents such as itraconazole or terbinafine.  Inflammatory skin diseases are often treated with low potency topical steroid creams such as hydrocortisone. More potent steroids are usually avoided in the flexures because they may cause skin thinning resulting in stretch marks (striae) and even ulcers. Calcineurin inhibitors such as tacrolimus ointment or pimecrolimus cream may also prove effective.        Scribe Attestation    I,:  Amy Darling am acting as a scribe while in the presence of the attending physician.:       I,:  Hi Lomax MD personally performed the services described in this documentation     as scribed in my presence.:

## 2024-05-28 NOTE — PATIENT INSTRUCTIONS
EYELID DERMATITIS    Assessment and Plan:  Based on a thorough discussion of this condition and the management approach to it (including a comprehensive discussion of the known risks, side effects and potential benefits of treatment), the patient (family) agrees to implement the following specific plan:    Reviewed gentle skin care in detail (no hot showers, limited soap usage to armpits, private area and feet, no washcloth, gentle pat dry with towel following shower, moisturizing with creams, ointments- not lotions). I recommend the La Roche Posay Lipikar Balm AP+ Moisturizing Cream     Start erythromycin 0.5% ophthalmic ointment as directed  Discussed to hold off on makeup around the eyes for some time to let the skin heal.  Patient will contact her Ophthalmologist for further treatment    INTERTRIGO    Assessment and Plan:  Based on a thorough discussion of this condition and the management approach to it (including a comprehensive discussion of the known risks, side effects and potential benefits of treatment), the patient (family) agrees to implement the following specific plan:  Start Triamcinolone 0.1% cream BID up to 2 weeks per months then as needed ginger flares    Assessment and Plan  Intertrigo describes a rash in the flexures or body folds, such as behind the ears, in the folds of the neck, under the arms (axillae), under a protruding abdomen, in the groin, between the buttocks, in the finger webs or toe spaces.  Although intertrigo may affect one skin fold, it is common for it to involve multiple sites.    Intertrigo can affect males and females of any age. It is particularly common in people that are overweight or obese (see metabolic syndrome). Other contributing factors are:  Genetic tendency to skin disease  Hyperhidrosis (excessive sweating)    Intertrigo can be acute (recent onset), relapsing (recurrent), or chronic (present for more than 6 weeks). The exact appearance and behaviour depends on the  underlying cause or causes.  The skin affected by intertrigo is inflamed, ie reddened and uncomfortable. It may become moist and macerated, leading to fissuring (cracks) and peeling.  Intertrigo is due to genetic and environmental factors.  Flexural skin has relatively high surface temperature  Moisture from insensible water loss and sweating cannot evaporate due to occlusion.  Friction from movement of adjacent skin results in chafing.    The microorganisms that are normally resident on flexural skin, the microbiome, include corynebacteria, other bacteria and yeasts. These multiply in warm moist environments and may cause disease.    We can classify intertrigo into infectious and inflammatory origin but there is often overlap.  Infections tend to be unilateral and asymmetrical.  Inflammatory disorders tend to be symmetrical affecting armpits, groins, under the breasts and the abdominal folds, except atopic dermatitis, which more often arises on the neck, and in elbow and knee creases.    Investigations may be necessary to determine the cause of intertrigo.  A swab for microscopy and culture of bacteria (microbiology)  A scraping for microscopy and culture of fungi (mycology)  A skin biopsy may be performed for histopathology if the skin condition is unusual or fails to respond to treatment.    What is the treatment for intertrigo?  Treatment depends on the underlying cause, if identified, and on which micro-organisms are present in the rash. Combinations are common.  Sweating may be reduced with a gentle antiperspirant.  Physical exertion should be followed by a bath and completely drying the skin folds using a hair dryer on cool setting, soft towel and/or corn starch powder.  Triple paste contains petrolatum, zinc oxide, and aluminum acetate solution to reduce friction, irritation and sweating.  Bacteria may be treated with topical antibiotics such as fusidic acid cream, mupirocin ointment, or oral antibiotics  such as flucloxacillin and erythromycin.  Yeasts and fungi may be treated with topical antifungals such as clotrimazole and terbinafine cream or oral antifungal agents such as itraconazole or terbinafine.  Inflammatory skin diseases are often treated with low potency topical steroid creams such as hydrocortisone. More potent steroids are usually avoided in the flexures because they may cause skin thinning resulting in stretch marks (striae) and even ulcers. Calcineurin inhibitors such as tacrolimus ointment or pimecrolimus cream may also prove effective.

## 2024-05-29 ENCOUNTER — OFFICE VISIT (OUTPATIENT)
Dept: PHYSICAL THERAPY | Facility: CLINIC | Age: 64
End: 2024-05-29
Payer: COMMERCIAL

## 2024-05-29 DIAGNOSIS — M54.16 RIGHT LUMBAR RADICULOPATHY: Primary | ICD-10-CM

## 2024-05-29 DIAGNOSIS — M77.8 LEFT SHOULDER TENDONITIS: ICD-10-CM

## 2024-05-29 DIAGNOSIS — M54.41 ACUTE RIGHT-SIDED LOW BACK PAIN WITH RIGHT-SIDED SCIATICA: ICD-10-CM

## 2024-05-29 PROCEDURE — 97112 NEUROMUSCULAR REEDUCATION: CPT

## 2024-05-29 PROCEDURE — 97530 THERAPEUTIC ACTIVITIES: CPT

## 2024-05-29 PROCEDURE — 97110 THERAPEUTIC EXERCISES: CPT

## 2024-05-29 NOTE — PROGRESS NOTES
Daily Note     Today's date: 2024  Patient name: Reba Mirza  : 1960  MRN: [id number]  Referring provider: Brina Ramirez, *  Dx:   Encounter Diagnosis     ICD-10-CM    1. Right lumbar radiculopathy  M54.16       2. Left shoulder tendonitis  M77.8       3. Acute right-sided low back pain with right-sided sciatica  M54.41              Subjective: Reba reports she continues to experience intermittent radicular symptoms into RLE. Notes she has been complaint with HEP daily, offer some relief but not long lasting.       Objective: See treatment diary below    Assessment: Reba tolerated PT treatment well. She continue to display significant neral tension with passive and active hamstring/gastroc stretching in RLE. Added supine and seated sciatic nerve glides with significant relief following. Updated HEP to reflect exercises, assess response NV.. Pt would benefit from continued PT to further address impairments and maximize functional level.      Plan: Continue per plan of care.      Precautions:   Past Medical History:   Diagnosis Date    No known problems          Manuals 5/20 5/22 5/29  5/13 5/15   R HS, ITB stretch JW JW JW  JZ JW   STM  JW JW   R medial calf  JW                      Neuro Re-Ed 5/20 5/22 5/29  5/13 5/15   Scap retract w/ sh ER         Horizontal abduction shoulder stretch         Chicken wings         Supine sciatic never glide    X10 ea      Seated sciatic nevre glide    X10 ea                         Ther Ex 5/20 5/22 5/29  5/13 5/15   ANTONY counter 3x10    2x10  3x10    REIL   3x10 HEP 3x10       Seated bent knee hamstring stretch          Supine active HS stretch X10 ea  X10 ea        U/l heel raise 3x10 ea  3x10 ea  3x10 ea  2x10 ea 3x10 ea    Heel raise step b/l 3x10  3x10    3x10  3x10   Calf stretch step :30x3 ea  :30x3 ea    :30x3 ea :30x3 ea            Single knee to chest     :10x5     Supine strap ITB stretch         Supine HS stretch strap         S/l hip abduction  "    :10x5 2x10 ea    Iwona pose          Standing active HS stretch 2x10  2x10  2x10  2x10  2x10            Ther Activity 5/20 5/22 5/29  5/13 5/15   Forward step down 4\" 3x10 ea 4\" 3x10 ea 4\" 3x10 ea  4\" 3x10 ea 4\" 3x10 ea   U/l calf press Seat7  20/ 3x10 ea Seat7  20/ 3x10 ea Seat 7 30/ 3x10   Seat7  20/ 3x10 ea Seat7  20/ 3x10 ea   Step up          RB Lvl 3 5'  Lvl 3 5'  Lvl 3 5'   5'  Lvl 3 5'                                                 "

## 2024-06-03 ENCOUNTER — OFFICE VISIT (OUTPATIENT)
Dept: PHYSICAL THERAPY | Facility: CLINIC | Age: 64
End: 2024-06-03
Payer: COMMERCIAL

## 2024-06-03 DIAGNOSIS — M77.8 LEFT SHOULDER TENDONITIS: ICD-10-CM

## 2024-06-03 DIAGNOSIS — M54.16 RIGHT LUMBAR RADICULOPATHY: Primary | ICD-10-CM

## 2024-06-03 DIAGNOSIS — M54.41 ACUTE RIGHT-SIDED LOW BACK PAIN WITH RIGHT-SIDED SCIATICA: ICD-10-CM

## 2024-06-03 PROCEDURE — 97110 THERAPEUTIC EXERCISES: CPT

## 2024-06-03 PROCEDURE — 97530 THERAPEUTIC ACTIVITIES: CPT

## 2024-06-03 PROCEDURE — 97112 NEUROMUSCULAR REEDUCATION: CPT

## 2024-06-03 NOTE — PROGRESS NOTES
"Daily Note     Today's date: 6/3/2024  Patient name: Reba Mirza  : 1960  MRN: [id number]  Referring provider: Brina Ramirez, *  Dx:   Encounter Diagnosis     ICD-10-CM    1. Right lumbar radiculopathy  M54.16       2. Left shoulder tendonitis  M77.8       3. Acute right-sided low back pain with right-sided sciatica  M54.41              Subjective: Reba reports she called [facility] for a re evaluation on 24.  Symptoms continue into right LE.        Objective: See treatment diary below    Assessment: Reba tolerated PT treatment well. She continue to display significant neral tension with passive and active hamstring/gastroc stretching in RLE.  Added piriformis stretch and LTR to treatment session with good tolerance.  No increase in leg pain with stretches.   Pt would benefit from continued PT to further address impairments and maximize functional level.      Plan: Continue per plan of care.      Precautions:   Past Medical History:   Diagnosis Date    No known problems          Manuals 5/20 5/22 5/29 6/3 5/13 5/15   R HS, ITB stretch JW JW JW  JZ JW   STM  JW JW   R medial calf  JW                      Neuro Re-Ed 5/20 5/22 5/29 6/3 5/13 5/15   Scap retract w/ sh ER         Horizontal abduction shoulder stretch         Chicken wings         Supine sciatic never glide    X10 ea X10 ea     Seated sciatic nevre glide    X10 ea  X10 ea                       Ther Ex 5/20 5/22 5/29 6/3 5/13 5/15   ANTONY counter 3x10   3x10 2x10  3x10    REIL   3x10 HEP 3x10  3x10     Seated bent knee hamstring stretch          Supine active HS stretch X10 ea  X10 ea        U/l heel raise 3x10 ea  3x10 ea  3x10 ea 3x10 ea 2x10 ea 3x10 ea    Heel raise step b/l 3x10  3x10    3x10  3x10   Calf stretch step :30x3 ea  :30x3 ea    :30x3 ea :30x3 ea   Piriformis stretch    20\" x3 ea     Single knee to chest     :10x5     Supine strap ITB stretch    20\" x3     Supine HS stretch strap    20\" x3 ea   " "  S/l hip abduction     :10x5 2x10 ea    Iwona pose          Standing active HS stretch 2x10  2x10  2x10 2x10 2x10  2x10   LTR    X20 reps                                         Ther Activity 5/20 5/22 5/29 6/3 5/13 5/15   Forward step down 4\" 3x10 ea 4\" 3x10 ea 4\" 3x10 ea 4\" 3x10 ea 4\" 3x10 ea 4\" 3x10 ea   U/l calf press Seat7  20/ 3x10 ea Seat7  20/ 3x10 ea Seat 7 30/ 3x10  Seat 7 30# 3x10 Seat7  20/ 3x10 ea Seat7  20/ 3x10 ea   Step up          RB Lvl 3 5'  Lvl 3 5'  Lvl 3 5'  Lvl 3 5' 5'  Lvl 3 5'                                                 "

## 2024-06-05 ENCOUNTER — OFFICE VISIT (OUTPATIENT)
Dept: PHYSICAL THERAPY | Facility: CLINIC | Age: 64
End: 2024-06-05
Payer: COMMERCIAL

## 2024-06-05 DIAGNOSIS — M54.16 RIGHT LUMBAR RADICULOPATHY: Primary | ICD-10-CM

## 2024-06-05 DIAGNOSIS — M54.41 ACUTE RIGHT-SIDED LOW BACK PAIN WITH RIGHT-SIDED SCIATICA: ICD-10-CM

## 2024-06-05 DIAGNOSIS — M77.8 LEFT SHOULDER TENDONITIS: ICD-10-CM

## 2024-06-05 PROCEDURE — 97140 MANUAL THERAPY 1/> REGIONS: CPT

## 2024-06-05 PROCEDURE — 97112 NEUROMUSCULAR REEDUCATION: CPT

## 2024-06-05 PROCEDURE — 97110 THERAPEUTIC EXERCISES: CPT

## 2024-06-05 NOTE — PROGRESS NOTES
"Daily Note     Today's date: 2024  Patient name: Reba Mirza  : 1960  MRN: [id number]  Referring provider: Brina Ramirez, *  Dx:   Encounter Diagnosis     ICD-10-CM    1. Right lumbar radiculopathy  M54.16       2. Left shoulder tendonitis  M77.8       3. Acute right-sided low back pain with right-sided sciatica  M54.41              Subjective: Reba reports she has had increased R radicular symptoms the last two days, \"I am not sure why\". Notes relief of symptoms with REIL, but temporary. Pt expresses frustration.       Objective: See treatment diary below    Assessment: Reba tolerated PT treatment well. She continue to display significant neral tension with passive and active hamstring/gastroc stretching in RLE. Responds favorably to repeated extension and sciatic nerve glides. Pt would benefit from continued PT to further address impairments and maximize functional level.      Plan: Continue per plan of care.      Precautions:   Past Medical History:   Diagnosis Date    No known problems          Manuals 5/20 5/22 5/29 6/3 6/5    R HS, ITB stretch JW JW JW  JW    [facility]  JW JW                         Neuro Re-Ed 5/20 5/22 5/29 6/3 6/5    Scap retract w/ sh ER         Horizontal abduction shoulder stretch         Chicken wings         Supine sciatic never glide    X10 ea X10 ea X10 ea    Seated sciatic nevre glide    X10 ea  X10 ea X10 ea                       Ther Ex 5/20 5/22 5/29 6/3 6/5    ANTONY counter 3x10   3x10 3x10    REIL   3x10 HEP 3x10  3x10 3x10     Seated bent knee hamstring stretch          Supine active HS stretch X10 ea  X10 ea        U/l heel raise 3x10 ea  3x10 ea  3x10 ea 3x10 ea 3x10 ea    Heel raise step b/l 3x10  3x10        Calf stretch step :30x3 ea  :30x3 ea        Piriformis stretch    20\" x3 ea     Single knee to chest         Supine strap ITB stretch    20\" x3     Supine HS stretch strap    20\" x3 ea     S/l hip abduction         Iwona pose          Standing active " "HS stretch 2x10  2x10  2x10 2x10 2x10 ea     LTR    X20 reps                                         Ther Activity 5/20 5/22 5/29 6/3 6/5    Forward step down 4\" 3x10 ea 4\" 3x10 ea 4\" 3x10 ea 4\" 3x10 ea 4\" 3x10 ea    U/l calf press Seat7  20/ 3x10 ea Seat7  20/ 3x10 ea Seat 7 30/ 3x10  Seat 7 30# 3x10 Seat 7 30# 3x10    Step up          RB Lvl 3 5'  Lvl 3 5'  Lvl 3 5'  Lvl 3 5' Lvl 3 5'                                                  "

## 2024-06-10 ENCOUNTER — APPOINTMENT (OUTPATIENT)
Dept: PHYSICAL THERAPY | Facility: CLINIC | Age: 64
End: 2024-06-10
Payer: COMMERCIAL

## 2024-06-12 ENCOUNTER — OFFICE VISIT (OUTPATIENT)
Dept: PHYSICAL THERAPY | Facility: CLINIC | Age: 64
End: 2024-06-12
Payer: COMMERCIAL

## 2024-06-12 DIAGNOSIS — M77.8 LEFT SHOULDER TENDONITIS: ICD-10-CM

## 2024-06-12 DIAGNOSIS — M54.41 ACUTE RIGHT-SIDED LOW BACK PAIN WITH RIGHT-SIDED SCIATICA: ICD-10-CM

## 2024-06-12 DIAGNOSIS — M54.16 RIGHT LUMBAR RADICULOPATHY: Primary | ICD-10-CM

## 2024-06-12 PROCEDURE — 97112 NEUROMUSCULAR REEDUCATION: CPT

## 2024-06-12 PROCEDURE — 97110 THERAPEUTIC EXERCISES: CPT

## 2024-06-12 PROCEDURE — 97140 MANUAL THERAPY 1/> REGIONS: CPT

## 2024-06-12 NOTE — PROGRESS NOTES
"Daily Note     Today's date: 2024  Patient name: Reba Mirza  : 1960  MRN: [id number]  Referring provider: Brina Ramirez, *  Dx:   Encounter Diagnosis     ICD-10-CM    1. Right lumbar radiculopathy  M54.16       2. Left shoulder tendonitis  M77.8       3. Acute right-sided low back pain with right-sided sciatica  M54.41              Subjective: Reba reports following last PT session she had relief of radicular symptoms into the evening, however returned the following day. Notes she has been dealing with family stress at home this past week.       Objective: See treatment diary below    Assessment: Reba tolerated PT treatment well. She continue to display significant neral tension with passive and active hamstring/gastroc stretching in RLE, some improvement in passive range. Favorable response to extension bias. Pt would benefit from continued PT to further address impairments and maximize functional level.      Plan: Continue per plan of care.      Precautions:   Past Medical History:   Diagnosis Date    No known problems          Manuals 5/20 5/22 5/29 6/3 6/5 6/12   R HS, ITB stretch JW JW JW  JW JW   [facility]  JW JW                         Neuro Re-Ed 5/20 5/22 5/29 6/3 6/5 6/12   Scap retract w/ sh ER         Horizontal abduction shoulder stretch         Chicken wings         Supine sciatic never glide    X10 ea X10 ea X10 ea 2x10 ea   Seated sciatic nevre glide    X10 ea  X10 ea X10 ea  X10 ea                     Ther Ex 5/20 5/22 5/29 6/3 6/5 6/12   ANTONY counter 3x10   3x10 3x10 3x10    REIL   3x10 HEP 3x10  3x10 3x10  3x10   Seated bent knee hamstring stretch          Supine active HS stretch X10 ea  X10 ea        U/l heel raise 3x10 ea  3x10 ea  3x10 ea 3x10 ea 3x10 ea 3x10 ea   Heel raise step b/l 3x10  3x10        Calf stretch step :30x3 ea  :30x3 ea        Piriformis stretch    20\" x3 ea     Single knee to chest         Supine strap ITB stretch    20\" x3     Supine HS stretch strap    " "20\" x3 ea     S/l hip abduction         Iwona pose          Standing active HS stretch 2x10  2x10  2x10 2x10 2x10 ea  2x10 ea    LTR    X20 reps                                         Ther Activity 5/20 5/22 5/29 6/3 6/5 6/12   Forward step down 4\" 3x10 ea 4\" 3x10 ea 4\" 3x10 ea 4\" 3x10 ea 4\" 3x10 ea 4\" 3x10 ea   U/l calf press Seat7  20/ 3x10 ea Seat7  20/ 3x10 ea Seat 7 30/ 3x10  Seat 7 30# 3x10 Seat 7 30# 3x10 Seat 7 30# 3x10   Step up          RB Lvl 3 5'  Lvl 3 5'  Lvl 3 5'  Lvl 3 5' Lvl 3 5'  Elliptical 5'                                                 "

## 2024-06-17 ENCOUNTER — OFFICE VISIT (OUTPATIENT)
Dept: PHYSICAL THERAPY | Facility: CLINIC | Age: 64
End: 2024-06-17
Payer: COMMERCIAL

## 2024-06-17 DIAGNOSIS — M77.8 LEFT SHOULDER TENDONITIS: ICD-10-CM

## 2024-06-17 DIAGNOSIS — M54.41 ACUTE RIGHT-SIDED LOW BACK PAIN WITH RIGHT-SIDED SCIATICA: ICD-10-CM

## 2024-06-17 DIAGNOSIS — M54.16 RIGHT LUMBAR RADICULOPATHY: Primary | ICD-10-CM

## 2024-06-17 PROCEDURE — 97112 NEUROMUSCULAR REEDUCATION: CPT

## 2024-06-17 PROCEDURE — 97110 THERAPEUTIC EXERCISES: CPT

## 2024-06-17 PROCEDURE — 97140 MANUAL THERAPY 1/> REGIONS: CPT

## 2024-06-17 NOTE — PROGRESS NOTES
"Daily Note     Today's date: 2024  Patient name: Reba Mirza  : 1960  MRN: [id number]  Referring provider: Brina Ramirez, *  Dx:   Encounter Diagnosis     ICD-10-CM    1. Right lumbar radiculopathy  M54.16       2. Left shoulder tendonitis  M77.8       3. Acute right-sided low back pain with right-sided sciatica  M54.41              Subjective: Reba reports she is eager for appointment with pain management tomorrow, continues to have R radicular pain into calf and hamstring.       Objective: See treatment diary below    Assessment: Reba tolerated PT treatment well. STM focusing along R gastroc/soleus, TTP reported throughout muscle belly. She continue to display significant neral tension with passive and active hamstring/gastroc stretching in RLE, some relief with extension bias. Pt would benefit from continued PT to further address impairments and maximize functional level.      Plan: Continue per plan of care.      Precautions:   Past Medical History:   Diagnosis Date    No known problems          Manuals 6/17  5/29 6/3 6/5 6/12   R HS, ITB stretch JW  JW  JW JW   STM  JW                          Neuro Re-Ed 6/17  5/29 6/3 6/5 6/12   Scap retract w/ sh ER         Horizontal abduction shoulder stretch         Chicken wings         Supine sciatic never glide  2x10   X10 ea X10 ea X10 ea 2x10 ea   Seated sciatic nevre glide  X10 ea  X10 ea  X10 ea X10 ea  X10 ea                     Ther Ex   5/29 6/3 6/5 6/12   ANTONY counter 3x10   3x10 3x10 3x10    REIL    3x10  3x10 3x10  3x10   Seated bent knee hamstring stretch          Supine active HS stretch         U/l heel raise 3x10 ea   3x10 ea 3x10 ea 3x10 ea 3x10 ea   Heel raise step b/l         Calf stretch step :30x3 ea         Piriformis stretch    20\" x3 ea     Single knee to chest         Supine strap ITB stretch    20\" x3     Supine HS stretch strap    20\" x3 ea     S/l hip abduction         Iwona pose          Standing active HS " "stretch 2x10 ea  2x10 2x10 2x10 ea  2x10 ea    LTR    X20 reps                                         Ther Activity 6/17  5/29 6/3 6/5 6/12   Forward step down   4\" 3x10 ea 4\" 3x10 ea 4\" 3x10 ea 4\" 3x10 ea   U/l calf press   Seat 7 30/ 3x10  Seat 7 30# 3x10 Seat 7 30# 3x10 Seat 7 30# 3x10   Step up          RB Lvl 3 5'   Lvl 3 5'  Lvl 3 5' Lvl 3 5'  Elliptical 5'                                                 "

## 2024-06-18 ENCOUNTER — OFFICE VISIT (OUTPATIENT)
Dept: PAIN MEDICINE | Facility: CLINIC | Age: 64
End: 2024-06-18
Payer: COMMERCIAL

## 2024-06-18 VITALS
HEART RATE: 99 BPM | BODY MASS INDEX: 25.43 KG/M2 | DIASTOLIC BLOOD PRESSURE: 82 MMHG | TEMPERATURE: 98.2 F | SYSTOLIC BLOOD PRESSURE: 110 MMHG | HEIGHT: 67 IN | WEIGHT: 162 LBS

## 2024-06-18 DIAGNOSIS — M54.16 LUMBAR RADICULOPATHY: ICD-10-CM

## 2024-06-18 DIAGNOSIS — M48.061 LUMBAR FORAMINAL STENOSIS: Primary | ICD-10-CM

## 2024-06-18 PROCEDURE — 99214 OFFICE O/P EST MOD 30 MIN: CPT | Performed by: ANESTHESIOLOGY

## 2024-06-18 NOTE — PROGRESS NOTES
Assessment  1. Lumbar foraminal stenosis    2. Lumbar radiculopathy        Plan    The patient's pain persists despite time, relative rest, activity modification and therapy.  Her pain is similar in nature and character than previous and she did well with a interlaminar epidural steroid injection versus a transforaminal.  I believe that she may benefit from a lumbar epidural steroid injection to diminish any inflammatory component of her pain. I will  use an translaminar approach.     In the office today, we reviewed the nature of the patient's pathology in depth using diagrams and models. I discussed the approach I would use for the epidural steroid injection and provided literature for home review. The patient understands the risks associated with the procedure including but not limited to bleeding, infection, tissue injury, exacerbation of symptoms, allergic reaction, spinal headache, and paralysis and provided verbal consent today.    My impressions and treatment recommendations were discussed in detail with the patient who verbalized understanding and had no further questions.  Discharge instructions were provided. I personally saw and examined the patient and I agree with the above discussed plan of care.  This note is created using dictation transcription.  It may contain typographical errors, grammatical errors, improperly dictated words, background noise and other errors.    Orders Placed This Encounter   Procedures    FL spine and pain procedure     Standing Status:   Future     Standing Expiration Date:   6/18/2028     Order Specific Question:   Reason for Exam:     Answer:   LESI to the right     Order Specific Question:   Anticoagulant hold needed?     Answer:   no     No orders of the defined types were placed in this encounter.      History of Present Illness    Reba Mirza is a 64 y.o. female reports 10 out of 10 pain rating in the posterior aspect of her right leg.  She was seen in February 2023  and underwent epidural steroid injection with complete resolution of symptoms until 6 weeks ago.  She has been undergoing physical therapy but her pain is worse in the morning and evening she denies any bowel bladder dysfunction.  Pain is constant described as achy throbbing cracking with a pressure-like sensation and occasional numbness.    I have personally reviewed and/or updated the patient's past medical history, past surgical history, family history, social history, current medications, allergies, and vital signs today.     Review of Systems   Constitutional:  Negative for fever and unexpected weight change.   HENT:  Negative for trouble swallowing.    Eyes:  Negative for visual disturbance.   Respiratory:  Negative for shortness of breath and wheezing.    Cardiovascular:  Negative for chest pain and palpitations.   Gastrointestinal:  Negative for constipation, diarrhea, nausea and vomiting.   Endocrine: Negative for cold intolerance, heat intolerance and polydipsia.   Genitourinary:  Negative for difficulty urinating and frequency.   Musculoskeletal:  Positive for gait problem. Negative for arthralgias, joint swelling and myalgias.   Skin:  Negative for rash.   Neurological:  Positive for weakness. Negative for dizziness, seizures, syncope and headaches.   Hematological:  Does not bruise/bleed easily.   Psychiatric/Behavioral:  Negative for dysphoric mood.    All other systems reviewed and are negative.      Patient Active Problem List   Diagnosis    Lipoma of torso    Low bone density for age    Acute right-sided low back pain with right-sided sciatica    Dextroscoliosis of lumbar spine    Degenerative disc disease at L5-S1 level    Osteopenia of neck of left femur    Post-menopausal    Protrusion of lumbar intervertebral disc    Right lumbar radiculopathy    Tinnitus of both ears       Past Medical History:   Diagnosis Date    No known problems        Past Surgical History:   Procedure Laterality Date     "HYSTERECTOMY      age 43    OOPHORECTOMY Left     age 43       Family History   Problem Relation Age of Onset    Diabetes Mother         Mellitus    Lymphoma Mother 80    No Known Problems Father     No Known Problems Sister     No Known Problems Daughter     Diabetes Maternal Grandmother         Mellitus    No Known Problems Maternal Grandfather     No Known Problems Paternal Grandmother     No Known Problems Paternal Grandfather     No Known Problems Maternal Aunt     No Known Problems Maternal Aunt     No Known Problems Brother     No Known Problems Son        Social History     Occupational History    Not on file   Tobacco Use    Smoking status: Never    Smokeless tobacco: Never   Vaping Use    Vaping status: Never Used   Substance and Sexual Activity    Alcohol use: Yes     Comment: Social    Drug use: No    Sexual activity: Not on file       Current Outpatient Medications on File Prior to Visit   Medication Sig    erythromycin (ILOTYCIN) ophthalmic ointment Administer 0.5 inches to both eyes daily at bedtime    fluticasone (FLONASE) 50 mcg/act nasal spray 2 sprays into each nostril daily    nystatin (MYCOSTATIN) powder Apply topically 2 (two) times a day    triamcinolone (KENALOG) 0.1 % cream Apply topically 2 (two) times a day Apply to affected area under the breasts     No current facility-administered medications on file prior to visit.       Allergies   Allergen Reactions    Pollen Extract     Sulfa Antibiotics GI Intolerance       Physical Exam    /82 (BP Location: Left arm, Patient Position: Sitting, Cuff Size: Standard)   Pulse 99   Temp 98.2 °F (36.8 °C)   Ht 5' 7\" (1.702 m)   Wt 73.5 kg (162 lb)   BMI 25.37 kg/m²     Constitutional: normal, well developed, well nourished, alert, in no distress and non-toxic and no overt pain behavior.  Eyes: anicteric  HEENT: grossly intact  Neck: supple, symmetric, trachea midline and no masses   Pulmonary:even and unlabored  Cardiovascular:No edema or " pitting edema present  Skin:Normal without rashes or lesions and well hydrated  Psychiatric:Mood and affect appropriate  Neurologic:Cranial Nerves II-XII grossly intact  Musculoskeletal:normal, difficulty going from sitting to standing sitting position; no obvious skin lesions or erythema lumbar sacral spine; no tenderness in lumbar paravertebrals, no sacroiliac or greater trochanteric tenderness bilateral; deep tendon reflexes are diminished but symmetrical bilateral patellar and achilles; no focal motor deficit appreciated lower limbs; positive straight leg raising on the right.    Imaging  LEFT HIP @  11-9-22     INDICATION:   M25.552: Pain in left hip.     COMPARISON:  None     VIEWS:  XR HIP/PELV 2-3 VWS LEFT  W PELVIS IF PERFORMED         FINDINGS:[     There is no acute fracture or dislocation.     No significant hip degenerative changes.     No lytic or blastic osseous lesion.     Soft tissues are unremarkable.     The visualized lumbar spine is unremarkable.     IMPRESSION:     No acute osseous abnormality.      MRI LUMBAR SPINE WITHOUT CONTRAST @  10-18-22     INDICATION: M54.41: Lumbago with sciatica, right side  M41.86: Other forms of scoliosis, lumbar region  M51.37: Other intervertebral disc degeneration, lumbosacral region.     COMPARISON:  X-rays dated 9/6/2022     TECHNIQUE:  Sagittal T1, sagittal T2, sagittal inversion recovery, axial T1 and axial T2, coronal T2.    IMAGE QUALITY:  Diagnostic     FINDINGS:     VERTEBRAL BODIES:  There are 5 lumbar type vertebral bodies.  Normal alignment of the lumbar spine.  No spondylolysis or spondylolisthesis. No scoliosis.  No compression fracture.    Normal marrow signal is identified within the visualized bony   structures.  No discrete marrow lesion.     SACRUM:  Normal signal within the sacrum. No evidence of insufficiency or stress fracture.     DISTAL CORD AND CONUS:  Normal size and signal within the distal cord and conus.     PARASPINAL SOFT  TISSUES:  Small T2 hyperintensities are seen within the liver and kidneys most likely representing small cysts.  The liver is enlarged particularly the right lobe which extends inferiorly into the abdomen.     LOWER THORACIC DISC SPACES:  Normal disc height and signal.  No disc herniation, canal stenosis or foraminal narrowing.     LUMBAR DISC SPACES:     L1-L2:  Normal.     L2-L3:  Normal.     L3-L4:  Minimal annular bulging bilaterally with slight loss of disc height.  Mild facet degenerative change.  No disc herniation.  No cauda equina impingement or foraminal narrowing.     L4-L5:  Slight disc desiccation without loss of disc height.  Mild annular bulging with a small central disc protrusion.  No canal stenosis.  No foraminal nerve impingement.     L5-S1:  Disc desiccation and loss of disc height.  Annular bulging with mild endplate hypertrophic change.  Small right foraminal disc protrusion.  Mild foraminal endplate hypertrophic change bilaterally.  There is no canal stenosis or mass effect upon   the thecal sac.  Mild foraminal narrowing bilaterally.     IMPRESSION:     Lumbar degenerative disc disease L3-4, L4-5 and L5-S1 as described above.  Mild bilateral foraminal narrowing at L5-S1 as a result of disc and endplate changes.       I have personally reviewed pertinent films in PACS and my interpretation is multilevel spondylosis with foraminal narrowing.

## 2024-06-19 ENCOUNTER — OFFICE VISIT (OUTPATIENT)
Dept: PHYSICAL THERAPY | Facility: CLINIC | Age: 64
End: 2024-06-19
Payer: COMMERCIAL

## 2024-06-19 DIAGNOSIS — M77.8 LEFT SHOULDER TENDONITIS: ICD-10-CM

## 2024-06-19 DIAGNOSIS — M54.16 RIGHT LUMBAR RADICULOPATHY: Primary | ICD-10-CM

## 2024-06-19 DIAGNOSIS — M54.41 ACUTE RIGHT-SIDED LOW BACK PAIN WITH RIGHT-SIDED SCIATICA: ICD-10-CM

## 2024-06-19 PROCEDURE — 97110 THERAPEUTIC EXERCISES: CPT

## 2024-06-19 PROCEDURE — 97112 NEUROMUSCULAR REEDUCATION: CPT

## 2024-06-19 PROCEDURE — 97140 MANUAL THERAPY 1/> REGIONS: CPT

## 2024-06-19 NOTE — PROGRESS NOTES
"Daily Note     Today's date: 2024  Patient name: Reba Mirza  : 1960  MRN: [id number]  Referring provider: Brina Ramirez, *  Dx:   Encounter Diagnosis     ICD-10-CM    1. Right lumbar radiculopathy  M54.16       2. Left shoulder tendonitis  M77.8       3. Acute right-sided low back pain with right-sided sciatica  M54.41                Subjective: Reba reports she had appointment with pain management, lumbar injection scheduled for tomorrow. Continues to note pain/numbness into R hamstring and down to heel. RLE was \"very sore\" following last PT session.     Objective: See treatment diary below    Assessment: Reba tolerated PT treatment well. Pt continue to display significant neral tension with passive and active hamstring/gastroc stretching in RLE. Centralization of radicular symptoms reported with repeated extension. Pt would benefit from continued PT to further address impairments and maximize functional level. Progress program pending response to steroid injection.       Plan: Continue per plan of care.      Precautions:   Past Medical History:   Diagnosis Date    No known problems          Manuals 6/17 6/19  6/3 6/5 6/12   R HS, ITB stretch JW JW   JW JW   [facility]  JW                          Neuro Re-Ed 6/17 6/19  6/3 6/5 6/12   Scap retract w/ sh ER         Horizontal abduction shoulder stretch         Chicken wings         Supine sciatic never glide  2x10  2x10   X10 ea X10 ea 2x10 ea   Seated sciatic nevre glide  X10 ea   X10 ea X10 ea  X10 ea                    Ther Ex    6/3 6/5 6/12   ANTONY counter 3x10 3x10  3x10 3x10 3x10    REIL   3x10  3x10 3x10  3x10   Seated bent knee hamstring stretch          Supine active HS stretch         U/l heel raise 3x10 ea    3x10 ea 3x10 ea 3x10 ea   Heel raise step b/l         Calf stretch step :30x3 ea  :30x3 ea       Piriformis stretch    20\" x3 ea     Single knee to chest         Supine strap ITB stretch    20\" x3     Supine HS stretch " "strap    20\" x3 ea     S/l hip abduction         Iwona pose          Standing active HS stretch 2x10 ea 2x10 ea  2x10 2x10 ea  2x10 ea    LTR  :10x10   X20 reps                                         Ther Activity 6/17 6/19  6/3 6/5 6/12   Forward step down    4\" 3x10 ea 4\" 3x10 ea 4\" 3x10 ea   U/l calf press    Seat 7 30# 3x10 Seat 7 30# 3x10 Seat 7 30# 3x10   Step up          RB Lvl 3 5'  Elliptical 3'  Lvl 3 5' Lvl 3 5'  Elliptical 5'                                                 "

## 2024-06-20 ENCOUNTER — HOSPITAL ENCOUNTER (OUTPATIENT)
Dept: RADIOLOGY | Facility: CLINIC | Age: 64
Discharge: HOME | End: 2024-06-20
Admitting: ANESTHESIOLOGY
Payer: COMMERCIAL

## 2024-06-20 VITALS
TEMPERATURE: 97.3 F | OXYGEN SATURATION: 96 % | SYSTOLIC BLOOD PRESSURE: 116 MMHG | DIASTOLIC BLOOD PRESSURE: 71 MMHG | RESPIRATION RATE: 16 BRPM | HEART RATE: 76 BPM

## 2024-06-20 DIAGNOSIS — M48.061 LUMBAR FORAMINAL STENOSIS: ICD-10-CM

## 2024-06-20 DIAGNOSIS — M54.16 LUMBAR RADICULOPATHY: ICD-10-CM

## 2024-06-20 PROCEDURE — 62323 NJX INTERLAMINAR LMBR/SAC: CPT | Performed by: ANESTHESIOLOGY

## 2024-06-20 RX ORDER — METHYLPREDNISOLONE ACETATE 80 MG/ML
80 INJECTION, SUSPENSION INTRA-ARTICULAR; INTRALESIONAL; INTRAMUSCULAR; PARENTERAL; SOFT TISSUE ONCE
Status: COMPLETED | OUTPATIENT
Start: 2024-06-20 | End: 2024-06-20

## 2024-06-20 RX ADMIN — METHYLPREDNISOLONE ACETATE 80 MG: 80 INJECTION, SUSPENSION INTRA-ARTICULAR; INTRALESIONAL; INTRAMUSCULAR; SOFT TISSUE at 09:46

## 2024-06-20 RX ADMIN — IOHEXOL 1 ML: 300 INJECTION, SOLUTION INTRAVENOUS at 09:46

## 2024-06-20 NOTE — DISCHARGE INSTRUCTIONS
Epidural Steroid Injection   WHAT YOU NEED TO KNOW:   An epidural steroid injection (LIANNA) is a procedure to inject steroid medicine into the epidural space. The epidural space is between your spinal cord and vertebrae. Steroids reduce inflammation and fluid buildup in your spine that may be causing pain. You may be given pain medicine along with the steroids.          ACTIVITY  Do not drive or operate machinery today.  No strenuous activity today - bending, lifting, etc.  You may resume normal activites starting tomorrow - start slowly and as tolerated.  You may shower today, but no tub baths or hot tubs.  You may have numbness for several hours from the local anesthetic. Please use caution and common sense, especially with weight-bearing activities.    CARE OF THE INJECTION SITE  If you have soreness or pain, apply ice to the area today (20 minutes on/20 minutes off).  Starting tomorrow, you may use warm, moist heat or ice if needed.  You may have an increase or change in your discomfort for 36-48 hours after your treatment.  Apply ice and continue with any pain medication you have been prescribed.  Notify the [facility] if you have any of the following: redness, drainage, swelling, headache, stiff neck or fever above 100°F.    SPECIAL INSTRUCTIONS  Our office will contact you in approximately 14 days for a progress report.    MEDICATIONS  Continue to take all routine medications.  Our office may have instructed you to hold some medications.    As no general anesthesia was used in today's procedure, you should not experience any side effects related to anesthesia.     If you are diabetic, the steroids used in today's injection may temporarily increase your blood sugar levels after the first few days after your injection. Please keep a close eye on your sugars and alert the doctor who manages your diabetes if your sugars are significantly high from your baseline or you are symptomatic.     If you have a  problem specifically related to your procedure, please call our office at [phone].  Problems not related to your procedure should be directed to your primary care physician.

## 2024-06-20 NOTE — H&P
History of Present Illness: The patient is a 64 y.o. female who presents with complaints of low back and leg pain.    Past Medical History:   Diagnosis Date    No known problems        Past Surgical History:   Procedure Laterality Date    HYSTERECTOMY      age 43    OOPHORECTOMY Left     age 43         Current Outpatient Medications:     erythromycin (ILOTYCIN) ophthalmic ointment, Administer 0.5 inches to both eyes daily at bedtime (Patient not taking: Reported on 6/20/2024), Disp: 3.5 g, Rfl: 0    fluticasone (FLONASE) 50 mcg/act nasal spray, 2 sprays into each nostril daily, Disp: 16 mL, Rfl: 3    nystatin (MYCOSTATIN) powder, Apply topically 2 (two) times a day, Disp: 30 g, Rfl: 2    triamcinolone (KENALOG) 0.1 % cream, Apply topically 2 (two) times a day Apply to affected area under the breasts, Disp: 15 g, Rfl: 2    Current Facility-Administered Medications:     iohexol (OMNIPAQUE) 300 mg/mL injection 1 mL, 1 mL, Epidural, Once, Juan Padron DO    methylPREDNISolone acetate (DEPO-MEDROL) injection 80 mg, 80 mg, Epidural, Once, Juan Padron DO    Allergies   Allergen Reactions    Pollen Extract     Sulfa Antibiotics GI Intolerance       Physical Exam:   Vitals:    06/20/24 0919   BP: 112/70   Pulse: 92   Resp: 16   Temp: (!) 97.3 °F (36.3 °C)   SpO2: 98%     General: Awake, Alert, Oriented x 3, Mood and affect appropriate  Respiratory: Respirations even and unlabored  Cardiovascular: Peripheral pulses intact; no edema  Musculoskeletal Exam: Decreased range of motion lumbar spine    ASA Score: II    Patient/Chart Verification  Patient ID Verified: Verbal  ID Band Applied: No  Consents Confirmed: Procedural, To be obtained in the Pre-Procedure area  Interval H&P(within 24 hr) Complete (required for Outpatients and Surgery Admit only): To be obtained in the Pre-Procedure area  Allergies Reviewed: Yes  Anticoag/NSAID held?: NA  Currently on antibiotics?: No  Pregnancy denied?: NA    Assessment:   1. Lumbar foraminal  stenosis    2. Lumbar radiculopathy        Plan: LESI to the right

## 2024-06-24 ENCOUNTER — APPOINTMENT (OUTPATIENT)
Dept: PHYSICAL THERAPY | Facility: CLINIC | Age: 64
End: 2024-06-24
Payer: COMMERCIAL

## 2024-06-26 ENCOUNTER — APPOINTMENT (OUTPATIENT)
Dept: PHYSICAL THERAPY | Facility: CLINIC | Age: 64
End: 2024-06-26
Payer: COMMERCIAL

## 2024-06-27 ENCOUNTER — APPOINTMENT (OUTPATIENT)
Dept: PHYSICAL THERAPY | Facility: CLINIC | Age: 64
End: 2024-06-27
Payer: COMMERCIAL

## 2024-07-02 ENCOUNTER — APPOINTMENT (OUTPATIENT)
Dept: PHYSICAL THERAPY | Facility: CLINIC | Age: 64
End: 2024-07-02
Payer: COMMERCIAL

## 2024-07-03 ENCOUNTER — TELEPHONE (OUTPATIENT)
Dept: PAIN MEDICINE | Facility: CLINIC | Age: 64
End: 2024-07-03

## 2024-07-03 ENCOUNTER — APPOINTMENT (OUTPATIENT)
Dept: PHYSICAL THERAPY | Facility: CLINIC | Age: 64
End: 2024-07-03
Payer: COMMERCIAL

## 2024-07-03 NOTE — TELEPHONE ENCOUNTER
Pt reports no improvement post inj   Pain level 7-8/10  She wants to know what her options are or she can have a sooner follow up.    HEIDY to the right 6/20, 7/30 F/u

## 2024-07-09 ENCOUNTER — OFFICE VISIT (OUTPATIENT)
Dept: DERMATOLOGY | Facility: CLINIC | Age: 64
End: 2024-07-09

## 2024-07-09 VITALS — TEMPERATURE: 97.3 F

## 2024-07-09 DIAGNOSIS — L72.0 MILIA: Primary | ICD-10-CM

## 2024-07-09 DIAGNOSIS — D22.9 MULTIPLE MELANOCYTIC NEVI: ICD-10-CM

## 2024-07-09 DIAGNOSIS — D18.01 CHERRY ANGIOMA: ICD-10-CM

## 2024-07-09 DIAGNOSIS — D48.9 NEOPLASM OF UNCERTAIN BEHAVIOR: ICD-10-CM

## 2024-07-09 DIAGNOSIS — L82.1 SEBORRHEIC KERATOSES: ICD-10-CM

## 2024-07-09 PROCEDURE — 88341 IMHCHEM/IMCYTCHM EA ADD ANTB: CPT | Performed by: STUDENT IN AN ORGANIZED HEALTH CARE EDUCATION/TRAINING PROGRAM

## 2024-07-09 PROCEDURE — 88342 IMHCHEM/IMCYTCHM 1ST ANTB: CPT | Performed by: STUDENT IN AN ORGANIZED HEALTH CARE EDUCATION/TRAINING PROGRAM

## 2024-07-09 PROCEDURE — 88305 TISSUE EXAM BY PATHOLOGIST: CPT | Performed by: STUDENT IN AN ORGANIZED HEALTH CARE EDUCATION/TRAINING PROGRAM

## 2024-07-09 NOTE — PROGRESS NOTES
"[facility] Note     Patient Name: Reba Mirza  Encounter Date: 7/9/24     Have you been cared for by a [facility] Dermatologist in the last 3 years and, if so, which description applies to you?    Yes.  I have been here within the last 3 years, and my medical history has NOT changed since that time.  I am FEMALE/of child-bearing potential.    REVIEW OF SYSTEMS:  Have you recently had or currently have any of the following? No changes in my recent health.   PAST MEDICAL HISTORY:  Have you personally ever had or currently have any of the following?  If \"YES,\" then please provide more detail. No changes in my medical history.   HISTORY OF IMMUNOSUPPRESSION: Do you have a history of any of the following:  Systemic Immunosuppression such as Diabetes, Biologic or Immunotherapy, Chemotherapy, Organ Transplantation, Bone Marrow Transplantation?  No     Answering \"YES\" requires the addition of the dotphrase \"IMMUNOSUPPRESSED\" as the first diagnosis of the patient's visit.   FAMILY HISTORY:  Any \"first degree relatives\" (parent, brother, sister, or child) with the following?    No changes in my family's known health.   PATIENT EXPERIENCE:    Do you want the Dermatologist to perform a COMPLETE skin exam today including a clinical examination under the \"bra and underwear\" areas?  Yes  If necessary, do we have your permission to call and leave a detailed message on your Preferred Phone number that includes your specific medical information?  Yes      Allergies   Allergen Reactions    Pollen Extract     Sulfa Antibiotics GI Intolerance      Current Outpatient Medications:     fluticasone (FLONASE) 50 mcg/act nasal spray, 2 sprays into each nostril daily, Disp: 16 mL, Rfl: 3    nystatin (MYCOSTATIN) powder, Apply topically 2 (two) times a day, Disp: 30 g, Rfl: 2    erythromycin (ILOTYCIN) ophthalmic ointment, Administer 0.5 inches to both eyes daily at bedtime (Patient not taking: Reported on 6/20/2024), Disp: " 3.5 g, Rfl: 0    triamcinolone (KENALOG) 0.1 % cream, Apply topically 2 (two) times a day Apply to affected area under the breasts (Patient not taking: Reported on 2024), Disp: 15 g, Rfl: 2          Whom besides the patient is providing clinical information about today's encounter?   NO ADDITIONAL HISTORIAN (patient alone provided history)    Physical Exam and Assessment/Plan by Diagnosis:    MILIUM     Physical Exam:  Anatomic Location Affected:  cheeks, forehead  Morphological Description:  small milky white papules  Pertinent Positives:  Pertinent Negatives:    Additional History of Present Condition:  Patient noticed some white spots on her cheeks that she was worried about. They don't cosmetically bother her.    Assessment and Plan:  Based on a thorough discussion of this condition and the management approach to it (including a comprehensive discussion of the known risks, side effects and potential benefits of treatment), the patient (family) agrees to implement the following specific plan:  Discussed option for milia extraction if desired  Patient opts for no treatment today    Assessment and Plan  A milium is a small cyst containing keratin (the skin protein); they are usually multiple and are then known as milia. These harmless cysts present as tiny pearly-white bumps just under the surface of the skin.  Milia are common in all ages and both sexes. They most often arise on the face and are particularly prominent on the eyelids and cheeks, but they may occur elsewhere.  There are various kinds of milia.   milia: Affect 40-50% of  babies, few to numerous lesions, often seen on the nose, but may also arise inside the mouth on the mucosa (Marcial pearls) or palate (Tari nodules) or more widely on the scalp, face and upper trunk, Heal spontaneously within a few weeks of birth.  Primary milia in children and adults: found around eyelids, cheeks, forehead and genitalia, in young children, a row  of milia may appear along the nasal crease, may clear in a few weeks or persist for months or longer.    Juvenile milia: associated with Rombo syndrome, basal cell naevus syndrome, Cjuhi-Vutum-Aoqzamxs syndrome, pachyonychia congenita, Gray syndrome and other genetic disorders, may be congenital (present at birth) or appear later in life.  Milia en plaque: multiple milia appear on within an inflamed plaque up to several centimeters in diameter, usually found on an eyelid, behind the ear, on a cheek or jaw.  3. Affect children and adults, especially middle-aged women.  4. Sometimes associated with another skin disease including pseudoxanthoma elasticum, discoid lupus erythematosus, lichen planus.  Multiple eruptive milia: crops of numerous milia appear over a few weeks to months, lesions may be asymptomatic or itchy, most often affect the face, upper arms and upper trunk.  Traumatic milia: occur at the site of injury as skin heals, arise from eccrine sweat ducts, examples include thermal burns, dermabrasion, blistering rashes such as bullous pemphigoid, often seen on the back of hands and fingers in porphyria cutanea tarda, a milia-like calcified nodule may develop after  heel stick blood test.   Milia associated with drugs: may rarely follow the use of topical medication, such as phenols, hydroquinone, 5-fluorouracil cream, and a corticosteroid.    Milia have a characteristic appearance. However, on occasion, a skin biopsy may be performed. This shows a small epidermoid cyst coming from a vellus hair follicle.  Milia should be distinguished from other types of cyst, comedones, xanthelasma and syringomas. Colloid milia are lee coloured bumps on cheeks and temples associated with excessive exposure to sunlight.  They should also be distinguished from milia-like cysts noted on dermoscopy in seborrhoeic keratoses, papillomatous moles and some basal cell carcinomas.  Milia do not need to be treated unless  they are a cause for concern for the patient. They often clear up by themselves within a few months. Where possible, further trauma should be minimised to reduce the development of new lesions.  The lesion may be de-roofed using a sterile needle or blade and the contents squeezed or pricked out.  They may be destroyed using diathermy and curettage, or cryotherapy.  For widespread lesions, topical retinoids may be helpful.  Chemical peels, dermabrasion and laser ablation have been reported to be effective when used for very extensive milia.  Milia en plaque may improve with minocycline (a tetracycline antibiotic).         SEBORRHEIC KERATOSES  - Relevant exam: Scattered over the trunk/extremities are waxy brown to black plaques and papules with stuck on appearance and consistent dermoscopy  - Exam and clinical history consistent with seborrheic keratoses  - Counseled that these are benign growths that do not require treatment    MELANOCYTIC NEVI  -Relevant exam: Scattered over the trunk/extremities are homogenously pigmented brown macules and papules. ELM performed and without concerning findings. No outliers unless otherwise noted in today's note  - Exam and clinical history consistent with melanocytic nevi  - Counseled to return to clinic prior to scheduled appointment should any of these lesions change or should any new lesions of concern arise  - Counseled on use of sun protection daily. Reviewed latest FDA sunscreen guidelines, including use of broad spectrum (UVA and UVB blocking) sunscreen or sun protective clothing with SPF 30-50 every 2-3 hours and reapplied after exposure to water    LENTIGINES  OTHER SKIN CHANGES DUE TO CHRONIC EXPOSURE TO NONIONIZING RADIATION  - Relevant exam: Over sun exposed areas are brown macules. ELM performed and without concerning findings.  - Exam and clinical history consistent with lentigines.  - Counseled to return to clinic prior to scheduled appointment should any of these  "lesions change or should any new lesions of concern arise.  - Recommended use of sunscreen as above and below.    CHERRY ANGIOMAS  - Relevant exam: Scattered over the trunk/extremities are red papules  - Exam and clinical history consistent with cherry angiomas  - Educated that these are benign    NEOPLASM OF UNCERTAIN BEHAVIOR OF SKIN    Physical Exam:  (Anatomic Location); (Size and Morphological Description); (Differential Diagnosis):  Central upper back; 0.9 cm x 0.9 cm pink papule with scattered brown pigment and telangiectasias; ddx inflamed sk vs pigmented BCC  Pertinent Positives:  Pertinent Negatives:    Additional History of Present Condition:  Patients  had noticed the spot on her back and was worried    Assessment and Plan:  I have discussed with the patient that a sample of skin via a \"skin biopsy” would be potentially helpful to further make a specific diagnosis under the microscope.  Based on a thorough discussion of this condition and the management approach to it (including a comprehensive discussion of the known risks, side effects and potential benefits of treatment), the patient (family) agrees to implement the following specific plan:    Procedure:  Skin Biopsy.  After a thorough discussion of treatment options and risk/benefits/alternatives (including but not limited to local pain, scarring, dyspigmentation, blistering, possible superinfection, and inability to confirm a diagnosis via histopathology), verbal and written consent were obtained and portion of the rash was biopsied for tissue sample.  See below for consent that was obtained from patient and subsequent Procedure Note.    PROCEDURE TANGENTIAL (SHAVE) BIOPSY NOTE:    Performing Physician:  Daniela Toro PA-C  Anatomic Location; Clinical Description with size (cm); Pre-Op Diagnosis:   Central upper back; 0.9 cm x 0.9 cm pink papule with scattered brown pigment and telangiectasias; ddx inflamed sk vs pigmented BCC  Post-op " "diagnosis: Same     Local anesthesia: 2% Xylocaine with epi     Topical anesthesia: None    Hemostasis: Aluminum chloride       After obtaining informed consent  at which time there was a discussion about the purpose of biopsy  and low risks of infection and bleeding.  The area was prepped and draped in the usual fashion. Anesthesia was obtained with 1% lidocaine with epinephrine. A shave biopsy to an appropriate sampling depth was obtained by Shave (Dermablade or 15 blade) The resulting wound was covered with surgical ointment and bandaged appropriately.     The patient tolerated the procedure well without complications and was without signs of functional compromise.      Specimen has been sent for review by Dermatopathology.    Standard post-procedure care has been explained and has been included in written form within the patient's copy of Informed Consent.    INFORMED CONSENT DISCUSSION AND POST-OPERATIVE INSTRUCTIONS FOR PATIENT    I.  RATIONALE FOR PROCEDURE  I understand that a skin biopsy allows the Dermatologist to test a lesion or rash under the microscope to obtain a diagnosis.  It usually involves numbing the area with numbing medication and removing a small piece of skin; sometimes the area will be closed with sutures. In this specific procedure, sutures are not usually needed.  If any sutures are placed, then they are usually need to be removed in 2 weeks or less.    I understand that my Dermatologist recommends that a skin \"shave\" biopsy be performed today.  A local anesthetic, similar to the kind that a dentist uses when filling a cavity, will be injected with a very small needle into the skin area to be sampled.  The injected skin and tissue underneath \"will go to sleep” and become numb so no pain should be felt afterwards.  An instrument shaped like a tiny \"razor blade\" (shave biopsy instrument) will be used to cut a small piece of tissue and skin from the area so that a sample of tissue can be " "taken and examined more closely under the microscope.  A slight amount of bleeding will occur, but it will be stopped with direct pressure and a pressure bandage and any other appropriate methods.  I understands that a scar will form where the wound was created.  Surgical ointment will be applied to help protect the wound.  Sutures are not usually needed.    II.  RISKS AND POTENTIAL COMPLICATIONS   I understand the risks and potential complications of a skin biopsy include but are not limited to the following:  Bleeding  Infection  Pain  Scar/keloid  Skin discoloration  Incomplete Removal  Recurrence  Nerve Damage/Numbness/Loss of Function  Allergic Reaction to Anesthesia  Biopsies are diagnostic procedures and based on findings additional treatment or evaluation may be required  Loss or destruction of specimen resulting in no additional findings    My Dermatologist has explained to me the nature of the condition, the nature of the procedure, and the benefits to be reasonably expected compared with alternative approaches.  My Dermatologist has discussed the likelihood of major risks or complications of this procedure including the specific risks listed above, such as bleeding, infection, and scarring/keloid.  I understand that a scar is expected after this procedure.  I understand that my physician cannot predict if the scar will form a \"keloid,\" which extends beyond the borders of the wound that is created.  A keloid is a thick, painful, and bumpy scar.  A keloid can be difficult to treat, as it does not always respond well to therapy, which includes injecting cortisone directly into the keloid every few weeks.  While this usually reduces the pain and size of the scar, it does not eliminate it.      I understand that photographs may be taken before and after the procedure.  These will be maintained as part of the medical providers confidential records and may not be made available to me.  I further authorize the " "medical provider to use the photographs for teaching purposes or to illustrate scientific papers, books, or lectures if in his/her judgment, medical research, education, or science may benefit from its use.    I have had an opportunity to fully inquire about the risks and benefits of this procedure and its alternatives.   I have been given ample time and opportunity to ask questions and to seek a second opinion if I wished to do so.  I acknowledge that there have specifically been no guarantees as to the cosmetic results from the procedure.  I am aware that with any procedure there is always the possibility of an unexpected complication.    III. POST-PROCEDURAL CARE (WHAT YOU WILL NEED TO DO \"AFTER THE BIOPSY\" TO OPTIMIZE HEALING)    Keep the area clean and dry.  Try NOT to remove the bandage or get it wet for the first 24 hours.    Gently clean the area and apply surgical ointment (such as Vaseline petrolatum ointment, which is available \"over the counter\" and not a prescription) to the biopsy site for up to 2 weeks straight.  This acts to protect the wound from the outside world.      Sutures are not usually placed in this procedure.  If any sutures were placed, return for suture removal as instructed (generally 1 week for the face, 2 weeks for the body).      Take Acetaminophen (Tylenol) for discomfort, if no contraindications.  Ibuprofen or aspirin could make bleeding worse.    Call our office immediately for signs of infection: fever, chills, increased redness, warmth, tenderness, discomfort/pain, or pus or foul smell coming from the wound.    WHAT TO DO IF THERE IS ANY BLEEDING?  If a small amount of bleeding is noticed, place a clean cloth over the area and apply firm pressure for ten minutes.  Check the wound after 10 minutes of direct pressure.  If bleeding persists, try one more time for an additional 10 minutes of direct pressure on the area.  If the bleeding becomes heavier or does not stop after the " second attempt, or if you have any other questions about this procedure, then please call your [facility]'s Dermatologist by calling [phone] (SKIN).     I hereby acknowledge that I have reviewed and verified the site with my Dermatologist and have requested and authorized my Dermatologist to proceed with the procedure.      Scribe Attestation      I,:  Efren Santana am acting as a scribe while in the presence of the attending physician.:       I,:  Daniela Toro PA-C personally performed the services described in this documentation    as scribed in my presence.:

## 2024-07-09 NOTE — PATIENT INSTRUCTIONS
MILIUM        Assessment and Plan:  Based on a thorough discussion of this condition and the management approach to it (including a comprehensive discussion of the known risks, side effects and potential benefits of treatment), the patient (family) agrees to implement the following specific plan:  Discusses extraction     Assessment and Plan  A milium is a small cyst containing keratin (the skin protein); they are usually multiple and are then known as milia. These harmless cysts present as tiny pearly-white bumps just under the surface of the skin.  Milia are common in all ages and both sexes. They most often arise on the face and are particularly prominent on the eyelids and cheeks, but they may occur elsewhere.  There are various kinds of milia.   milia: Affect 40-50% of  babies, few to numerous lesions, often seen on the nose, but may also arise inside the mouth on the mucosa (Marcial pearls) or palate (Tari nodules) or more widely on the scalp, face and upper trunk, Heal spontaneously within a few weeks of birth.  Primary milia in children and adults: found around eyelids, cheeks, forehead and genitalia, in young children, a row of milia may appear along the nasal crease, may clear in a few weeks or persist for months or longer.    Juvenile milia: associated with Rombo syndrome, basal cell naevus syndrome, Ngxsg-Xugpo-Obwfhwcn syndrome, pachyonychia congenita, Gray syndrome and other genetic disorders, may be congenital (present at birth) or appear later in life.  Milia en plaque: multiple milia appear on within an inflamed plaque up to several centimeters in diameter, usually found on an eyelid, behind the ear, on a cheek or jaw.  3. Affect children and adults, especially middle-aged women.  4. Sometimes associated with another skin disease including pseudoxanthoma elasticum, discoid lupus erythematosus, lichen planus.    Multiple eruptive milia: crops of numerous milia appear over a few  weeks to months, lesions may be asymptomatic or itchy, most often affect the face, upper arms and upper trunk.    Traumatic milia: occur at the site of injury as skin heals, arise from eccrine sweat ducts, examples include thermal burns, dermabrasion, blistering rashes such as bullous pemphigoid, often seen on the back of hands and fingers in porphyria cutanea tarda, a milia-like calcified nodule may develop after  heel stick blood test.     Milia associated with drugs: may rarely follow the use of topical medication, such as phenols, hydroquinone, 5-fluorouracil cream, and a corticosteroid.         Milia have a characteristic appearance. However, on occasion, a skin biopsy may be performed. This shows a small epidermoid cyst coming from a vellus hair follicle.  Milia should be distinguished from other types of cyst, comedones, xanthelasma and syringomas. Colloid milia are lee coloured bumps on cheeks and temples associated with excessive exposure to sunlight.  They should also be distinguished from milia-like cysts noted on dermoscopy in seborrhoeic keratoses, papillomatous moles and some basal cell carcinomas.  Milia do not need to be treated unless they are a cause for concern for the patient. They often clear up by themselves within a few months. Where possible, further trauma should be minimised to reduce the development of new lesions.  The lesion may be de-roofed using a sterile needle or blade and the contents squeezed or pricked out.  They may be destroyed using diathermy and curettage, or cryotherapy.  For widespread lesions, topical retinoids may be helpful.  Chemical peels, dermabrasion and laser ablation have been reported to be effective when used for very extensive milia.  Milia en plaque may improve with minocycline (a tetracycline antibiotic).            SEBORRHEIC KERATOSES  - Relevant exam: Scattered over the trunk/extremities are waxy brown to black plaques and papules with stuck on  "appearance and consistent dermoscopy  - Exam and clinical history consistent with seborrheic keratoses  - Counseled that these are benign growths that do not require treatment     MELANOCYTIC NEVI  -Relevant exam: Scattered over the trunk/extremities are homogenously pigmented brown macules and papules. ELM performed and without concerning findings. No outliers unless otherwise noted in today's note  - Exam and clinical history consistent with melanocytic nevi  - Counseled to return to clinic prior to scheduled appointment should any of these lesions change or should any new lesions of concern arise  - Counseled on use of sun protection daily. Reviewed latest FDA sunscreen guidelines, including use of broad spectrum (UVA and UVB blocking) sunscreen or sun protective clothing with SPF 30-50 every 2-3 hours and reapplied after exposure to water     LENTIGINES  OTHER SKIN CHANGES DUE TO CHRONIC EXPOSURE TO NONIONIZING RADIATION  - Relevant exam: Over sun exposed areas are brown macules. ELM performed and without concerning findings.  - Exam and clinical history consistent with lentigines.  - Counseled to return to clinic prior to scheduled appointment should any of these lesions change or should any new lesions of concern arise.  - Recommended use of sunscreen as above and below.     CHERRY ANGIOMAS  - Relevant exam: Scattered over the trunk/extremities are red papules  - Exam and clinical history consistent with cherry angiomas  - Educated that these are benign     NEOPLASM OF UNCERTAIN BEHAVIOR OF SKIN        Assessment and Plan:  I have discussed with the patient that a sample of skin via a \"skin biopsy” would be potentially helpful to further make a specific diagnosis under the microscope.  Based on a thorough discussion of this condition and the management approach to it (including a comprehensive discussion of the known risks, side effects and potential benefits of treatment), the patient (family) agrees to " "implement the following specific plan:     Procedure:  Skin Biopsy.  After a thorough discussion of treatment options and risk/benefits/alternatives (including but not limited to local pain, scarring, dyspigmentation, blistering, possible superinfection, and inability to confirm a diagnosis via histopathology), verbal and written consent were obtained and portion of the rash was biopsied for tissue sample.  See below for consent that was obtained from patient and subsequent Procedure Note.     PROCEDURE TANGENTIAL (SHAVE) BIOPSY NOTE:       After obtaining informed consent  at which time there was a discussion about the purpose of biopsy  and low risks of infection and bleeding.  The area was prepped and draped in the usual fashion. Anesthesia was obtained with 1% lidocaine with epinephrine. A shave biopsy to an appropriate sampling depth was obtained by Shave (Dermablade or 15 blade) The resulting wound was covered with surgical ointment and bandaged appropriately.      The patient tolerated the procedure well without complications and was without signs of functional compromise.       Specimen has been sent for review by Dermatopathology.     Standard post-procedure care has been explained and has been included in written form within the patient's copy of Informed Consent.     INFORMED CONSENT DISCUSSION AND POST-OPERATIVE INSTRUCTIONS FOR PATIENT     I.  RATIONALE FOR PROCEDURE  I understand that a skin biopsy allows the Dermatologist to test a lesion or rash under the microscope to obtain a diagnosis.  It usually involves numbing the area with numbing medication and removing a small piece of skin; sometimes the area will be closed with sutures. In this specific procedure, sutures are not usually needed.  If any sutures are placed, then they are usually need to be removed in 2 weeks or less.     I understand that my Dermatologist recommends that a skin \"shave\" biopsy be performed today.  A local anesthetic, " "similar to the kind that a dentist uses when filling a cavity, will be injected with a very small needle into the skin area to be sampled.  The injected skin and tissue underneath \"will go to sleep” and become numb so no pain should be felt afterwards.  An instrument shaped like a tiny \"razor blade\" (shave biopsy instrument) will be used to cut a small piece of tissue and skin from the area so that a sample of tissue can be taken and examined more closely under the microscope.  A slight amount of bleeding will occur, but it will be stopped with direct pressure and a pressure bandage and any other appropriate methods.  I understands that a scar will form where the wound was created.  Surgical ointment will be applied to help protect the wound.  Sutures are not usually needed.     II.  RISKS AND POTENTIAL COMPLICATIONS   I understand the risks and potential complications of a skin biopsy include but are not limited to the following:  Bleeding  Infection  Pain  Scar/keloid  Skin discoloration  Incomplete Removal  Recurrence  Nerve Damage/Numbness/Loss of Function  Allergic Reaction to Anesthesia  Biopsies are diagnostic procedures and based on findings additional treatment or evaluation may be required  Loss or destruction of specimen resulting in no additional findings     My Dermatologist has explained to me the nature of the condition, the nature of the procedure, and the benefits to be reasonably expected compared with alternative approaches.  My Dermatologist has discussed the likelihood of major risks or complications of this procedure including the specific risks listed above, such as bleeding, infection, and scarring/keloid.  I understand that a scar is expected after this procedure.  I understand that my physician cannot predict if the scar will form a \"keloid,\" which extends beyond the borders of the wound that is created.  A keloid is a thick, painful, and bumpy scar.  A keloid can be difficult to treat, as " "it does not always respond well to therapy, which includes injecting cortisone directly into the keloid every few weeks.  While this usually reduces the pain and size of the scar, it does not eliminate it.       I understand that photographs may be taken before and after the procedure.  These will be maintained as part of the medical providers confidential records and may not be made available to me.  I further authorize the medical provider to use the photographs for teaching purposes or to illustrate scientific papers, books, or lectures if in his/her judgment, medical research, education, or science may benefit from its use.     I have had an opportunity to fully inquire about the risks and benefits of this procedure and its alternatives.   I have been given ample time and opportunity to ask questions and to seek a second opinion if I wished to do so.  I acknowledge that there have specifically been no guarantees as to the cosmetic results from the procedure.  I am aware that with any procedure there is always the possibility of an unexpected complication.    III. POST-PROCEDURAL CARE (WHAT YOU WILL NEED TO DO \"AFTER THE BIOPSY\" TO OPTIMIZE HEALING)     Keep the area clean and dry.  Try NOT to remove the bandage or get it wet for the first 24 hours.     Gently clean the area and apply surgical ointment (such as Vaseline petrolatum ointment, which is available \"over the counter\" and not a prescription) to the biopsy site for up to 2 weeks straight.  This acts to protect the wound from the outside world.       Sutures are not usually placed in this procedure.  If any sutures were placed, return for suture removal as instructed (generally 1 week for the face, 2 weeks for the body).       Take Acetaminophen (Tylenol) for discomfort, if no contraindications.  Ibuprofen or aspirin could make bleeding worse.     Call our office immediately for signs of infection: fever, chills, increased redness, warmth, tenderness, " discomfort/pain, or pus or foul smell coming from the wound.     WHAT TO DO IF THERE IS ANY BLEEDING?  If a small amount of bleeding is noticed, place a clean cloth over the area and apply firm pressure for ten minutes.  Check the wound after 10 minutes of direct pressure.  If bleeding persists, try one more time for an additional 10 minutes of direct pressure on the area.  If the bleeding becomes heavier or does not stop after the second attempt, or if you have any other questions about this procedure, then please call your [facility]'s Dermatologist by calling [phone] (SKIN).      I hereby acknowledge that I have reviewed and verified the site with my Dermatologist and have requested and authorized my Dermatologist to proceed with the procedure.

## 2024-07-10 ENCOUNTER — APPOINTMENT (OUTPATIENT)
Dept: PHYSICAL THERAPY | Facility: CLINIC | Age: 64
End: 2024-07-10
Payer: COMMERCIAL

## 2024-07-11 ENCOUNTER — APPOINTMENT (OUTPATIENT)
Dept: PHYSICAL THERAPY | Facility: CLINIC | Age: 64
End: 2024-07-11
Payer: COMMERCIAL

## 2024-07-11 ENCOUNTER — TELEPHONE (OUTPATIENT)
Age: 64
End: 2024-07-11

## 2024-07-11 DIAGNOSIS — U07.1 COVID-19: Primary | ICD-10-CM

## 2024-07-11 RX ORDER — NIRMATRELVIR AND RITONAVIR 300-100 MG
3 KIT ORAL 2 TIMES DAILY
Qty: 30 TABLET | Refills: 0 | Status: SHIPPED | OUTPATIENT
Start: 2024-07-11 | End: 2024-07-16

## 2024-07-11 NOTE — TELEPHONE ENCOUNTER
She is right on the cusp of the 5 day (tomorrow night would be day 5). I send paxlovid, she can certainly start it but may not be as effective given her timeline but may benefit her. I recommend mucinex, nasal saline sprays, rest, fluids, deep breathing exercises. If she develops chest tightness, difficulty breathing go to ER.

## 2024-07-11 NOTE — TELEPHONE ENCOUNTER
Patient tested positive for Covid on 07/08. Would like to know if she can get a script sent over for Paxlovid to [facility] in [place]. Please advise.

## 2024-07-15 ENCOUNTER — EVALUATION (OUTPATIENT)
Dept: PHYSICAL THERAPY | Facility: CLINIC | Age: 64
End: 2024-07-15
Payer: COMMERCIAL

## 2024-07-15 DIAGNOSIS — M54.16 RIGHT LUMBAR RADICULOPATHY: Primary | ICD-10-CM

## 2024-07-15 DIAGNOSIS — M77.8 LEFT SHOULDER TENDONITIS: ICD-10-CM

## 2024-07-15 DIAGNOSIS — M54.41 ACUTE RIGHT-SIDED LOW BACK PAIN WITH RIGHT-SIDED SCIATICA: ICD-10-CM

## 2024-07-15 PROCEDURE — 97110 THERAPEUTIC EXERCISES: CPT | Performed by: PHYSICAL THERAPIST

## 2024-07-15 PROCEDURE — 97530 THERAPEUTIC ACTIVITIES: CPT | Performed by: PHYSICAL THERAPIST

## 2024-07-15 PROCEDURE — 97140 MANUAL THERAPY 1/> REGIONS: CPT | Performed by: PHYSICAL THERAPIST

## 2024-07-15 NOTE — PROGRESS NOTES
PT Re-Evaluation     Today's date: 7/15/2024  Patient name: Reba Mirza  : 1960  MRN: [id number]  Referring provider: Brina Ramirez, *  Dx:   Encounter Diagnosis     ICD-10-CM    1. Right lumbar radiculopathy  M54.16       2. Left shoulder tendonitis  M77.8       3. Acute right-sided low back pain with right-sided sciatica  M54.41                Assessment  Assessment details:   Pt reported that she has been off work for 4 weeks which has improved her pain. She also received injection on . Didn't noticed any significant change initially, however noticed recent improvement, however notes R heel numbness/tenderness. Since beginning PT Reba reports GROC improvement of 50%. MMT demonstrated improved strength.  ROM assessment demonstrated improved flexibility. Functional outcome measures demonstrated improved functional ability. Despite improvements she continues to present with pain, decreased strength, decreased ROM, decreased joint mobility, and ambulatory dysfunction. Due to these impairments, patient has difficulty performing ADL's, recreational activities, work-related activities, engaging in social activities, ambulation, stair negotiation, lifting/carrying, transfers. Patient's clinical presentation is consistent with their referring diagnosis of Right lumbar radiculopathy  (primary encounter diagnosis), Left shoulder tendonitis, Acute right-sided low back pain with right-sided sciatica. Patient has been educated in home exercise program and plan of care. Patient would benefit from skilled physical therapy services to address their aforementioned functional limitations and progress towards prior level of function and independence with home exercise program.     Impairments: abnormal gait, abnormal or restricted ROM, activity intolerance, impaired balance, impaired physical strength, lacks appropriate home exercise program, pain with function, poor posture  and poor body  mechanics  Functional limitations: getting out of bed, prolonged sitting/stand, carrying, lift, push/pull   Prognosis: good  Prognosis details: + factors: high motivation levels  - factors: age    Goals  Short term goals to be accomplished by 5/22/24:  STG 1: Pt will demo independence with postural management and I with HEP to maximize progress between therapy sessions. Achieved.   STG 2: Pt will demo L/S AROM < or = min loss throughout to promote improved functional mobility and body mechanics. Achieved.   STG 3: Pt will reports pain dec freq and intensity 50%. Achieved.   STG 4: Pt will deny sleep disturbance. Achieved.     Long term goals to be accomplished by 8/25/24  LTG 1: Pt will be able to sit at work for 1 hour without radicular pain. Progressing toward.  LTG 2: Pt will be able to walk for 1 hour without pain. Progressing toward.  LTG 3: Pt will be able to go down stairs without instability or pain. Progressing toward.      Plan  Plan details: HEP development, stretching, strengthening, A/AA/PROM, joint mobilizations, posture education, STM/MI as needed to reduce muscle tension, muscle reeducation, PLOC discussed and agreed upon with patient.      Patient would benefit from: PT eval and skilled physical therapy  Planned modality interventions: cryotherapy, thermotherapy: hydrocollator packs and unattended electrical stimulation  Planned therapy interventions: manual therapy, neuromuscular re-education, self care, therapeutic activities, therapeutic exercise, home exercise program, joint mobilization, balance, patient education, stretching, strengthening and flexibility  Frequency: 2x week  Plan of Care beginning date: 4/24/2024  Plan of Care expiration date: 8/25/2024  Treatment plan discussed with: patient        Subjective Evaluation    History of Present Illness  Mechanism of injury: Pt is a 65 y/o female who presents to PT with R side lumbar pain with sciatica and L shoulder pain.   Shoulder: Pain began  a couple months ago. Notes pain only present with overhead lift/taking off shirt.    Lumbar: Pain began about 1-2 months ago. Insidious onset. Some numbness/tingling. Pain resolved with laying supine. Pain provoked with sitting, standing, walking.     Patient Goals  Patient goals for therapy: decreased pain, increased motion, increased strength and independence with ADLs/IADLs  Patient goal: getting out of bed, prolonged sitting/stand, carrying, lift, push/pull  Pain  Current pain ratin  At best pain ratin  At worst pain ratin  Location: R lumbar/scaitica extending into R posterior knee  Quality: radiating  Aggravating factors: standing, walking, stair climbing, overhead activity and lifting    Treatments  Current treatment: physical therapy        Objective     Concurrent Complaints  Negative for night pain, disturbed sleep, bladder dysfunction, bowel dysfunction and saddle (S4) numbness    Postural Observations  Seated posture: fair  Standing posture: fair  Correction of posture: has no consistent effect      Tenderness     Additional Tenderness Details  L RTC muscle belly   No TTP     Active Range of Motion   Cervical/Thoracic Spine       Thoracic    Extension:  Restriction level: moderate  Left Shoulder   Flexion: WFL    Lumbar   Extension:  Restriction level: moderate  Mechanical Assessment  Lumbar    Standing extension: repeated movements  Pain location: centralized  Pain intensity: better  Pain level: decreased  Lying extension: repeated movements  Pain location: centralized    R Side Glide:  Centralized pain, improved        Strength/Myotome Testing     Left Shoulder     Planes of Motion   Flexion: 4    Lumbar   Left   Heel walk: normal  Toe walk: normal    Right   Heel walk: normal  Toe walk: normal    Left Hip   Planes of Motion   Flexion: 5    Right Hip   Planes of Motion   Flexion: 4+    Left Knee   Flexion: 5  Extension: 5    Right Knee   Flexion: 4+  Extension: 4+    Left Ankle/Foot  "  Dorsiflexion: 5    Right Ankle/Foot   Dorsiflexion: 4+    Tests     Left Shoulder   Negative drop arm.     Lumbar     Left   Negative passive SLR, quadrant and slump test.     Right   Positive passive SLR and slump test.   Negative quadrant.        Precautions:   Past Medical History:   Diagnosis Date    No known problems      Manuals 6/17 6/19 7/15  6/5 6/12   R HS, ITB stretch JW JW   JW JW   STM  JW        R u/l TP PA mob   JZ               Neuro Re-Ed 6/17 6/19 7/15  6/5 6/12   Scap retract w/ sh ER         Horizontal abduction shoulder stretch         Chicken wings         Supine sciatic never glide  2x10  2x10    X10 ea 2x10 ea   Seated sciatic nevre glide  X10 ea    X10 ea  X10 ea                     Ther Ex 6/17 6/19 7/15  6/5 6/12   ANTONY counter 3x10 3x10 Forearm on wall 5 sec 5x10   3x10 3x10    REIL   3x10   3x10  3x10   Lat shift correct L arm on wall   5x10       Lumbar side bend stertch   3x10       U/l heel raise 3x10 ea     3x10 ea 3x10 ea   Heel raise step b/l         Calf stretch step :30x3 ea  :30x3 ea :30x3 ea      Piriformis stretch         Single knee to chest         Supine strap ITB stretch         Supine HS stretch strap         S/l hip abduction         Iwona pose          Standing active HS stretch 2x10 ea 2x10 ea   2x10 ea  2x10 ea    LTR  :10x10                          Educated on HEP   10'               Ther Activity 6/17 6/19 7/15  6/5 6/12   Forward step down     4\" 3x10 ea 4\" 3x10 ea   U/l calf press   Seat7   30# 3x10 ea  Seat 7 30# 3x10 Seat 7 30# 3x10   Step up          RB Lvl 3 5'  Elliptical 3' Elliptical 5'   Lvl 3 5'  Elliptical 5'                                          "

## 2024-07-16 NOTE — RESULT ENCOUNTER NOTE
DERMATOPATHOLOGY RESULT NOTE    Results reviewed by ordering physician.  Called patient to personally discuss results. Discussed results with patient.       Instructions for Clinical Derm Team:   (remember to route Result Note to appropriate staff):    Call patient and schedule for excision. Thank you!    Result & Plan by Specimen:    Specimen A: malignant  Plan: excision    Tissue Exam: [id number]  Order: [id number]   Status: Edited Result - FINAL      Visible to patient: Yes (not seen)      Dx: Neoplasm of uncertain behavior    0 Result Notes     Component   Case Report  Surgical Pathology Report                         Case: [id number]                                  Authorizing Provider:  Daniela Toro PA-C        Collected:           07/09/2024 1539              Ordering Location:     [facility]      Received:            07/09/2024 [facility]                                     [place]                                                                Pathologist:           Meghan Colvin MD                                                          Specimen:    Skin, Other, A. central upper back                                                      Addendum  SOX10 and Chris-EP4 immunostains were reviewed and support the diagnosis.  Addendum electronically signed by Meghan Colvin MD on 7/13/2024 at 10:07 PM  Final Diagnosis  A. Skin, central upper back, shave biopsy:    Pigmented BASAL CELL CARCINOMA (SUPERFICIAL AND NODULAR TYPE); transected.      Electronically signed by Meghan Colvin MD on 7/12/2024 at  6:35 PM  Additional Information   All reported additional testing was performed with appropriately reactive controls.  These tests were developed and their performance characteristics determined by [facility] or appropriate performing facility, though some tests may be performed on tissues which have not been validated for performance characteristics (such as staining performed on  "alcohol exposed cell blocks and decalcified tissues).  Results should be interpreted with caution and in the context of the patients' clinical condition. These tests may not be cleared or approved by the U.S. Food and Drug Administration, though the FDA has determined that such clearance or approval is not necessary. These tests are used for clinical purposes and they should not be regarded as investigational or for research. This laboratory has been approved by [place] 88, designated as a high-complexity laboratory and is qualified to perform these tests.  .  Gross Description   A. The specimen is received in formalin, labeled with the patient's name and hospital number, and is designated \" central upper back\".  The specimen consists of a shave biopsy of tan skin measuring 0.8 x 0.6 x 0.1 cm.  There is a 0.7 x 0.6 cm tan-gray plaque at the peripheral margin.  The apparent margin of resection is inked green.  The specimen is trisected along the short axis and entirely submitted between sponges in 1 cassette.    Note: The estimated total formalin fixation time based upon information provided by the submitting clinician and the standard processing schedule is under 72.0 hours.  RRavotti  Clinical Information   ATTENTION:  DERMPATH GROUP    SPECIMEN A; Skin; Anatomic Location: central upper back; Procedure/Protocol: Skin Specimen (submit in FORMALIN):Tangential Biopsy (includes shave, scoop, saucerization, curette) (CPT 30638; each additional tangential biopsy is CPT 87968)  64 y.o. year old  Female with a Morphological Description:0.9 cm x 0.9 cm pink papule with scattered brown pigment with telangiectasias  Differential Diagnosis and/or Specific Clinical Question:inflamed SK vs pigmented BCC  Resulting Agency [facility]          Specimen Collected: 07/09/24  3:39 PM Last Resulted: 07/13/24 10:07 PM     Order Details       View Encounter       Lab and Collection Details       Routing       Result History    View All " Conversations on this Encounter        Scans on Order [id number]    Lab Result Document - Document on 7/13/2024 10:07 PM

## 2024-07-17 ENCOUNTER — OFFICE VISIT (OUTPATIENT)
Dept: PHYSICAL THERAPY | Facility: CLINIC | Age: 64
End: 2024-07-17
Payer: COMMERCIAL

## 2024-07-17 ENCOUNTER — TELEPHONE (OUTPATIENT)
Dept: DERMATOLOGY | Facility: CLINIC | Age: 64
End: 2024-07-17

## 2024-07-17 DIAGNOSIS — M54.16 RIGHT LUMBAR RADICULOPATHY: Primary | ICD-10-CM

## 2024-07-17 DIAGNOSIS — M54.41 ACUTE RIGHT-SIDED LOW BACK PAIN WITH RIGHT-SIDED SCIATICA: ICD-10-CM

## 2024-07-17 DIAGNOSIS — M77.8 LEFT SHOULDER TENDONITIS: ICD-10-CM

## 2024-07-17 PROCEDURE — 97530 THERAPEUTIC ACTIVITIES: CPT | Performed by: PHYSICAL THERAPIST

## 2024-07-17 PROCEDURE — 97110 THERAPEUTIC EXERCISES: CPT | Performed by: PHYSICAL THERAPIST

## 2024-07-17 PROCEDURE — 97140 MANUAL THERAPY 1/> REGIONS: CPT | Performed by: PHYSICAL THERAPIST

## 2024-07-17 NOTE — TELEPHONE ENCOUNTER
----- Message from Daniela Toro PA-C sent at 7/16/2024  5:21 PM EDT -----  DERMATOPATHOLOGY RESULT NOTE    Results reviewed by ordering physician.  Called patient to personally discuss results. Discussed results with patient.       Instructions for Clinical Derm Team:   (remember to route Result Note to appropriate staff):    Call patient and schedule for excision. Thank you!    Result & Plan by Specimen:    Specimen A: malignant  Plan: excision    Tissue Exam: [id number]  Order: [id number]   Status: Edited Result - FINAL       Visible to patient: Yes (not seen)       Dx: Neoplasm of uncertain behavior    0 Result Notes     Component   Case Report  Surgical Pathology Report                         Case: [id number]                                  Authorizing Provider:  Daniela Toro PA-C        Collected:           07/09/2024 1539              Ordering Location:     [facility]      Received:            07/09/2024 1539                                     [place]                                                                Pathologist:           Meghan Colvin MD                                                          Specimen:    Skin, Other, A. central upper back                                                      Addendum  SOX10 and Hcris-EP4 immunostains were reviewed and support the diagnosis.  Addendum electronically signed by Meghan Colvin MD on 7/13/2024 at 10:07 PM  Final Diagnosis  A. Skin, central upper back, shave biopsy:     Pigmented BASAL CELL CARCINOMA (SUPERFICIAL AND NODULAR TYPE); transected.        Electronically signed by Meghan Colvin MD on 7/12/2024 at  6:35 PM  Additional Information   All reported additional testing was performed with appropriately reactive controls.  These tests were developed and their performance characteristics determined by [facility] or appropriate performing facility, though some tests may be performed on tissues which have  "not been validated for performance characteristics (such as staining performed on alcohol exposed cell blocks and decalcified tissues).  Results should be interpreted with caution and in the context of the patients' clinical condition. These tests may not be cleared or approved by the U.S. Food and Drug Administration, though the FDA has determined that such clearance or approval is not necessary. These tests are used for clinical purposes and they should not be regarded as investigational or for research. This laboratory has been approved by CLIA 88, designated as a high-complexity laboratory and is qualified to perform these tests.  .  Gross Description   A. The specimen is received in formalin, labeled with the patient's name and hospital number, and is designated \" central upper back\".  The specimen consists of a shave biopsy of tan skin measuring 0.8 x 0.6 x 0.1 cm.  There is a 0.7 x 0.6 cm tan-gray plaque at the peripheral margin.  The apparent margin of resection is inked green.  The specimen is trisected along the short axis and entirely submitted between sponges in 1 cassette.     Note: The estimated total formalin fixation time based upon information provided by the submitting clinician and the standard processing schedule is under 72.0 hours.  RRavotti  Clinical Information   ATTENTION:  DERMPATH GROUP    SPECIMEN A; Skin; Anatomic Location: central upper back; Procedure/Protocol: Skin Specimen (submit in FORMALIN):Tangential Biopsy (includes shave, scoop, saucerization, curette) (CPT 67558; each additional tangential biopsy is CPT 57084)  64 y.o. year old  Female with a Morphological Description:0.9 cm x 0.9 cm pink papule with scattered brown pigment with telangiectasias  Differential Diagnosis and/or Specific Clinical Question:inflamed SK vs pigmented BCC  Resulting Agency [facility]          Specimen Collected: 07/09/24  3:39 PM Last Resulted: 07/13/24 10:07 PM      Order Details        View Encounter  "       Lab and Collection Details        Routing        Result History    View All Conversations on this Encounter        Scans on Order [id number]    Lab Result Document - Document on 7/13/2024 10:07 PM

## 2024-07-17 NOTE — PROGRESS NOTES
Daily Note     Today's date: 2024  Patient name: Reba Mirza  : 1960  MRN: [id number]  Referring provider: Brina Ramirez, *  Dx:   Encounter Diagnosis     ICD-10-CM    1. Right lumbar radiculopathy  M54.16       2. Left shoulder tendonitis  M77.8       3. Acute right-sided low back pain with right-sided sciatica  M54.41              Subjective: Pt reported that she returned to work on Tuesday and today and it did increase her pain/soreness.     Objective: See treatment diary below    Assessment: Tolerated treatment well. Patient demonstrated fatigue post treatment, exhibited good technique with therapeutic exercises, and would benefit from continued PT    Plan: Continue per plan of care.      Precautions:   Past Medical History:   Diagnosis Date    No known problems      Manuals 6/17 6/19 7/15 7/17  6/12   R HS, ITB stretch JW JW    JW   STM  JW   R calf      R u/l TP PA mob   JZ               Neuro Re-Ed 6/17 6/19 7/15   6/12   Scap retract w/ sh ER         Horizontal abduction shoulder stretch         Chicken wings         Supine sciatic never glide  2x10  2x10     2x10 ea   Seated sciatic nevre glide  X10 ea     X10 ea                     Ther Ex 6/17 6/19 7/15 7/17  6/12   ANTONY counter 3x10 3x10 Forearm on wall 5 sec 5x10  Forearm on wall - 5x peripheralized symptoms  3x10    REIL   3x10  10x10   3x10   Lat shift correct L arm on wall   5x10  3x10 no effect on symptoms     Lumbar side bend stertch   3x10       U/l heel raise 3x10 ea      3x10 ea   Heel raise step b/l         Calf stretch step :30x3 ea  :30x3 ea :30x3 ea :30x3 ea     Piriformis stretch         Single knee to chest         Supine strap ITB stretch         Supine HS stretch strap         S/l hip abduction         Iwona pose          Standing active HS stretch 2x10 ea 2x10 ea    2x10 ea    LTR  :10x10                          Educated on HEP   10' 15' centralization/periopheralization education, REIL education             "  Ther Activity 6/17 6/19 7/15 7/17  6/12   Forward step down      4\" 3x10 ea   U/l calf press   Seat7   30# 3x10 ea Seat7 30# 3x10 ea  Seat 7 30# 3x10   Step up          RB Lvl 3 5'  Elliptical 3' Elliptical 5'  Elliptical 5'  Elliptical 5'                                                       "

## 2024-07-17 NOTE — TELEPHONE ENCOUNTER
Jovan Britton in regards of scheduling an excision; specimen A: malignant, per Daniela. Included cb number in message.

## 2024-07-18 NOTE — TELEPHONE ENCOUNTER
Was informed that 7/25 appointment must be rescheduled. Called and [email or web address] to reschedule and also sent portal message.

## 2024-07-22 ENCOUNTER — OFFICE VISIT (OUTPATIENT)
Dept: PHYSICAL THERAPY | Facility: CLINIC | Age: 64
End: 2024-07-22
Payer: COMMERCIAL

## 2024-07-22 DIAGNOSIS — M77.8 LEFT SHOULDER TENDONITIS: ICD-10-CM

## 2024-07-22 DIAGNOSIS — M54.41 ACUTE RIGHT-SIDED LOW BACK PAIN WITH RIGHT-SIDED SCIATICA: ICD-10-CM

## 2024-07-22 DIAGNOSIS — M54.16 RIGHT LUMBAR RADICULOPATHY: Primary | ICD-10-CM

## 2024-07-22 PROCEDURE — 97530 THERAPEUTIC ACTIVITIES: CPT

## 2024-07-22 PROCEDURE — 97110 THERAPEUTIC EXERCISES: CPT

## 2024-07-22 NOTE — PROGRESS NOTES
"Daily Note     Today's date: 2024  Patient name: Reba Mirza  : 1960  MRN: [id number]  Referring provider: Brina Ramirez, *  Dx:   Encounter Diagnosis     ICD-10-CM    1. Right lumbar radiculopathy  M54.16       2. Left shoulder tendonitis  M77.8       3. Acute right-sided low back pain with right-sided sciatica  M54.41                Subjective: Reba reports since recent lumbar injection she has seen slight improvement symptoms, \"the pain is more tolerable\". Notes majority of radicular symptoms present in heel/calf, but less intense.       Objective: See treatment diary below    Assessment: Reba tolerated PT treatment well. Centralization of radicular symptoms occurring with repeated extension. Fatigues fairly quickly with PF strengthening. Pt would benefit from continued PT to further address impairments and maximize functional level.      Plan: Continue per plan of care.      Precautions:   Past Medical History:   Diagnosis Date    No known problems      Manuals 6/17 6/19 7/15 7/17 7/22    R HS, ITB stretch JW JW       STM  JW   R calf      R u/l TP PA mob   JZ               Neuro Re-Ed 6/17 6/19 7/15 7/17 7/22    Scap retract w/ sh ER         Horizontal abduction shoulder stretch         Chicken wings         Supine sciatic never glide  2x10  2x10        Seated sciatic nevre glide  X10 ea                          Ther Ex 6/17 6/19 7/15 7/17 7/22    ANTONY counter 3x10 3x10 Forearm on wall 5 sec 5x10  Forearm on wall - 5x peripheralized symptoms Forearm on wall 5 sec 2x10     REIL   3x10  10x10  10x10   Prolonged ext 2' 2'     Lat shift correct L arm on wall   5x10  3x10 no effect on symptoms     Lumbar side bend stertch   3x10       U/l heel raise 3x10 ea         Heel raise step b/l         Calf stretch step :30x3 ea  :30x3 ea :30x3 ea :30x3 ea :30x3 ea     Piriformis stretch         Single knee to chest         Supine strap ITB stretch         Supine HS stretch strap         S/l hip " abduction         Iwona pose          Standing active HS stretch 2x10 ea 2x10 ea       LTR  :10x10                          Educated on HEP   10' 15' centralization/periopheralization education, REIL education 10'             Ther Activity 6/17 6/19 7/15 7/17 7/22    Forward step down         U/l calf press   Seat7   30# 3x10 ea Seat7 30# 3x10 ea Seat7 30# 3x10 ea    Step up          RB Lvl 3 5'  Elliptical 3' Elliptical 5'  Elliptical 5' Elliptical 5'

## 2024-07-23 ENCOUNTER — OFFICE VISIT (OUTPATIENT)
Dept: PAIN MEDICINE | Facility: CLINIC | Age: 64
End: 2024-07-23
Payer: COMMERCIAL

## 2024-07-23 VITALS
BODY MASS INDEX: 24.96 KG/M2 | TEMPERATURE: 97.8 F | HEIGHT: 67 IN | DIASTOLIC BLOOD PRESSURE: 78 MMHG | WEIGHT: 159 LBS | SYSTOLIC BLOOD PRESSURE: 118 MMHG | HEART RATE: 74 BPM

## 2024-07-23 DIAGNOSIS — M54.16 LUMBAR RADICULOPATHY: Primary | ICD-10-CM

## 2024-07-23 DIAGNOSIS — M51.36 LUMBAR DEGENERATIVE DISC DISEASE: ICD-10-CM

## 2024-07-23 PROCEDURE — 99214 OFFICE O/P EST MOD 30 MIN: CPT | Performed by: PHYSICIAN ASSISTANT

## 2024-07-23 NOTE — PROGRESS NOTES
Assessment:  1. Lumbar radiculopathy    2. Lumbar degenerative disc disease        Plan:  While the patient was in the office today, I did have a thorough conversation regarding their chronic pain syndrome, medication management, and treatment plan options.    After discussing options, I feel it is medically necessary and reasonable to obtain a new MRI of the lumbar spine given the minimal amount of relief that she has experienced with the recent L5-S1 epidural steroid injection and lack of relief with physical therapy.  Once we obtain the results from the MRI we will contact her to discuss potential treatment options.    The patient was advised to contact the office should their symptoms worsen in the interim. The patient was agreeable and verbalized an understanding.        History of Present Illness:    The patient is a 64 y.o. female last seen on 6/20/2024 who presents for a follow up office visit in regards to chronic low back pain secondary to lumbar spondylosis and degenerative disc disease with radiculopathy.  The patient currently reports right lower extremity radicular pain along the posterior and lateral aspect that she rates a 6 out of 10.  She describes the pain as a constant dull, aching, cramping and pressure-like pain with associated numbness and tingling in the heel.  On 6/20/2024 the patient underwent an L5-S1 epidural steroid injection and she reports minimal relief.  The patient had this injection previously she had near complete resolution of pain for over a year.  She has been participating in physical therapy but unfortunately is unable to note any progression of improvement.  She continues to do home stretching exercises as well.    I have personally reviewed and/or updated the patient's past medical history, past surgical history, family history, social history, current medications, allergies, and vital signs today.       Review of Systems:    Review of Systems   Respiratory:  Negative for  shortness of breath.    Cardiovascular:  Negative for chest pain.   Gastrointestinal:  Positive for diarrhea. Negative for constipation, nausea and vomiting.   Musculoskeletal:  Positive for gait problem. Negative for arthralgias, joint swelling and myalgias.   Skin:  Negative for rash.   Neurological:  Negative for dizziness, seizures and weakness.   All other systems reviewed and are negative.        Past Medical History:   Diagnosis Date   • No known problems        Past Surgical History:   Procedure Laterality Date   • HYSTERECTOMY      age 43   • OOPHORECTOMY Left     age 43       Family History   Problem Relation Age of Onset   • Diabetes Mother         Mellitus   • Lymphoma Mother 80   • No Known Problems Father    • No Known Problems Sister    • No Known Problems Daughter    • Diabetes Maternal Grandmother         Mellitus   • No Known Problems Maternal Grandfather    • No Known Problems Paternal Grandmother    • No Known Problems Paternal Grandfather    • No Known Problems Maternal Aunt    • No Known Problems Maternal Aunt    • No Known Problems Brother    • No Known Problems Son        Social History     Occupational History   • Not on file   Tobacco Use   • Smoking status: Never   • Smokeless tobacco: Never   Vaping Use   • Vaping status: Never Used   Substance and Sexual Activity   • Alcohol use: Yes     Comment: Social   • Drug use: No   • Sexual activity: Not on file         Current Outpatient Medications:   •  fluticasone (FLONASE) 50 mcg/act nasal spray, 2 sprays into each nostril daily, Disp: 16 mL, Rfl: 3  •  erythromycin (ILOTYCIN) ophthalmic ointment, Administer 0.5 inches to both eyes daily at bedtime (Patient not taking: Reported on 6/20/2024), Disp: 3.5 g, Rfl: 0  •  nystatin (MYCOSTATIN) powder, Apply topically 2 (two) times a day (Patient not taking: Reported on 7/23/2024), Disp: 30 g, Rfl: 2  •  triamcinolone (KENALOG) 0.1 % cream, Apply topically 2 (two) times a day Apply to affected area  "under the breasts (Patient not taking: Reported on 7/9/2024), Disp: 15 g, Rfl: 2    Allergies   Allergen Reactions   • Pollen Extract    • Sulfa Antibiotics GI Intolerance       Physical Exam:    /78 (BP Location: Left arm, Patient Position: Sitting, Cuff Size: Standard)   Pulse 74   Temp 97.8 °F (36.6 °C)   Ht 5' 7\" (1.702 m)   Wt 72.1 kg (159 lb)   BMI 24.90 kg/m²     Constitutional:normal, well developed, well nourished, alert, in no distress and non-toxic and no overt pain behavior.  Eyes:anicteric  HEENT:grossly intact  Neck:supple, symmetric, trachea midline and no masses   Pulmonary:even and unlabored  Cardiovascular:No edema or pitting edema present  Skin:Normal without rashes or lesions and well hydrated  Psychiatric:Mood and affect appropriate  Neurologic:Cranial Nerves II-XII grossly intact  Musculoskeletal: Tender right lumbar paraspinal muscles, pain with forward flexion      Imaging  MRI lumbar spine without contrast    (Results Pending)         Orders Placed This Encounter   Procedures   • MRI lumbar spine without contrast       "

## 2024-07-24 ENCOUNTER — OFFICE VISIT (OUTPATIENT)
Dept: PHYSICAL THERAPY | Facility: CLINIC | Age: 64
End: 2024-07-24
Payer: COMMERCIAL

## 2024-07-24 DIAGNOSIS — M54.41 ACUTE RIGHT-SIDED LOW BACK PAIN WITH RIGHT-SIDED SCIATICA: ICD-10-CM

## 2024-07-24 DIAGNOSIS — M77.8 LEFT SHOULDER TENDONITIS: ICD-10-CM

## 2024-07-24 DIAGNOSIS — M54.16 RIGHT LUMBAR RADICULOPATHY: Primary | ICD-10-CM

## 2024-07-24 PROCEDURE — 97140 MANUAL THERAPY 1/> REGIONS: CPT | Performed by: PHYSICAL THERAPIST

## 2024-07-24 PROCEDURE — 97110 THERAPEUTIC EXERCISES: CPT | Performed by: PHYSICAL THERAPIST

## 2024-07-24 NOTE — PROGRESS NOTES
"Daily Note     Today's date: 2024  Patient name: Reba Mirza  : 1960  MRN: [id number]  Referring provider: Brina Ramirez, *  Dx:   Encounter Diagnosis     ICD-10-CM    1. Right lumbar radiculopathy  M54.16       2. Left shoulder tendonitis  M77.8       3. Acute right-sided low back pain with right-sided sciatica  M54.41              Subjective: Pt reported that \"today I woke up with some soreness which is frustrating. Seems like since I returned to work the pain has been worse.\"     Objective: See treatment diary below    Assessment: Tolerated treatment well. Patient demonstrated fatigue post treatment, exhibited good technique with therapeutic exercises, and would benefit from continued PT    Plan: Continue per plan of care.      Precautions:   Past Medical History:   Diagnosis Date    No known problems      Manuals 6/17 6/19 7/15 7/17 7/22 7/24   R HS, ITB stretch JW JW    JZ gentle    STM  JW   R calf   R calf    R u/l TP PA aidan BARRON               Neuro Re-Ed 6/17 6/19 7/15 7/17 7/22 7/24   Scap retract w/ sh ER         Horizontal abduction shoulder stretch         Chicken wings         Supine sciatic never glide  2x10  2x10        Seated sciatic nevre glide  X10 ea                          Ther Ex 6/17 6/19 7/15 7/17 7/22 7/24   ANTONY counter 3x10 3x10 Forearm on wall 5 sec 5x10  Forearm on wall - 5x peripheralized symptoms Forearm on wall 5 sec 2x10  Counter 2 x 10  Forearm on wall 2 x 10    REIL   3x10  10x10  10x10   Prolonged ext 2' 2'  10x10   Prolonged ext 2',2'   Lat shift correct L arm on wall   5x10  3x10 no effect on symptoms     Lumbar side bend stertch   3x10       U/l heel raise 3x10 ea         Heel raise step b/l         Calf stretch step :30x3 ea  :30x3 ea :30x3 ea :30x3 ea :30x3 ea     Piriformis stretch         Single knee to chest         Supine strap ITB stretch         Supine HS stretch strap         S/l hip abduction         Iwona pose          Standing active HS " stretch 2x10 ea 2x10 ea       LTR  :10x10                          Educated on HEP   10' 15' centralization/periopheralization education, REIL education 10'             Ther Activity 6/17 6/19 7/15 7/17 7/22 7/24   Forward step down         U/l calf press   Seat7   30# 3x10 ea Seat7 30# 3x10 ea Seat7 30# 3x10 ea Seat7  30#    Step up          RB Lvl 3 5'  Elliptical 3' Elliptical 5'  Elliptical 5' Elliptical 5' Elliptical 5'

## 2024-07-29 ENCOUNTER — OFFICE VISIT (OUTPATIENT)
Dept: PHYSICAL THERAPY | Facility: CLINIC | Age: 64
End: 2024-07-29
Payer: COMMERCIAL

## 2024-07-29 DIAGNOSIS — M54.41 ACUTE RIGHT-SIDED LOW BACK PAIN WITH RIGHT-SIDED SCIATICA: ICD-10-CM

## 2024-07-29 DIAGNOSIS — M77.8 LEFT SHOULDER TENDONITIS: ICD-10-CM

## 2024-07-29 DIAGNOSIS — M54.16 RIGHT LUMBAR RADICULOPATHY: Primary | ICD-10-CM

## 2024-07-29 PROCEDURE — 97110 THERAPEUTIC EXERCISES: CPT | Performed by: PHYSICAL THERAPIST

## 2024-07-29 NOTE — PROGRESS NOTES
Daily Note     Today's date: 2024  Patient name: Reba Mirza  : 1960  MRN: [id number]  Referring provider: Brina Ramirez, *  Dx:   Encounter Diagnosis     ICD-10-CM    1. Right lumbar radiculopathy  M54.16       2. Left shoulder tendonitis  M77.8       3. Acute right-sided low back pain with right-sided sciatica  M54.41              Subjective: Pt reported that she has not been doing her prone press ups this weekend and she is having increased pain.     Objective: See treatment diary below    Assessment: Tolerated treatment well. Patient demonstrated fatigue post treatment, exhibited good technique with therapeutic exercises, and would benefit from continued PT    Plan: Continue per plan of care.      Precautions:   Past Medical History:   Diagnosis Date    No known problems      Manuals 7/29  7/15 7/17 7/22 7/24   R HS, ITB stretch      JZ gentle    STM     R calf   R calf    R u/l TP PA mob   JZ               Neuro Re-Ed 7/29  7/15 7/17 7/22 7/24   Scap retract w/ sh ER         Horizontal abduction shoulder stretch         Chicken wings         Supine sciatic never glide          Seated sciatic nevre glide                            Ther Ex 7/29  7/15 7/17 7/22 7/24   ANTONY counter Forearm wall 2 x 10   Forearm on wall 5 sec 5x10  Forearm on wall - 5x peripheralized symptoms Forearm on wall 5 sec 2x10  Counter 2 x 10  Forearm on wall 2 x 10    REIL  2x10  2x10   2x10   2x10 w/ strap   10x10  10x10   Prolonged ext 2' 2'  10x10   Prolonged ext 2',2'   Lat shift correct L arm on wall 2x10 L arm wall  5x10  3x10 no effect on symptoms     Lumbar side bend stertch   3x10       Prone - elevated HOB  1', 1', 1', 1', 1' increasing height        Heel raise step b/l         Calf stretch step   :30x3 ea :30x3 ea :30x3 ea     Piriformis stretch         Single knee to chest         Supine strap ITB stretch         Supine HS stretch strap         S/l hip abduction         Iwona pose          Standing active  HS stretch         LTR         Elliptical 5'                  Educated on HEP   10' 15' centralization/periopheralization education, REIL education 10'             Ther Activity 7/29  7/15 7/17 7/22 7/24   Forward step down         U/l calf press   Seat7   30# 3x10 ea Seat7 30# 3x10 ea Seat7 30# 3x10 ea Seat7  30#    Step up             Elliptical 5'  Elliptical 5' Elliptical 5' Elliptical 5'

## 2024-07-31 ENCOUNTER — OFFICE VISIT (OUTPATIENT)
Dept: PHYSICAL THERAPY | Facility: CLINIC | Age: 64
End: 2024-07-31
Payer: COMMERCIAL

## 2024-07-31 DIAGNOSIS — M54.41 ACUTE RIGHT-SIDED LOW BACK PAIN WITH RIGHT-SIDED SCIATICA: ICD-10-CM

## 2024-07-31 DIAGNOSIS — M54.16 RIGHT LUMBAR RADICULOPATHY: Primary | ICD-10-CM

## 2024-07-31 DIAGNOSIS — M77.8 LEFT SHOULDER TENDONITIS: ICD-10-CM

## 2024-07-31 PROCEDURE — 97110 THERAPEUTIC EXERCISES: CPT | Performed by: PHYSICAL THERAPIST

## 2024-07-31 PROCEDURE — 97140 MANUAL THERAPY 1/> REGIONS: CPT | Performed by: PHYSICAL THERAPIST

## 2024-07-31 NOTE — PROGRESS NOTES
Daily Note     Today's date: 2024  Patient name: Reba Mirza  : 1960  MRN: [id number]  Referring provider: Brina Ramirez, *  Dx:   Encounter Diagnosis     ICD-10-CM    1. Right lumbar radiculopathy  M54.16       2. Left shoulder tendonitis  M77.8       3. Acute right-sided low back pain with right-sided sciatica  M54.41              Subjective: Pt reported that she woke up this morning with increased pain. Noted that she took Ibuprofen which did improve her pain today.     Objective: See treatment diary below    Assessment: Tolerated treatment well. Patient demonstrated fatigue post treatment, exhibited good technique with therapeutic exercises, and would benefit from continued PT    Plan: Continue per plan of care.      Precautions:   Past Medical History:   Diagnosis Date    No known problems      Manuals    R HS, ITB stretch      JZ gentle    STM   R calf   R calf   R calf    R u/l TP PA mob  JZ                 Neuro Re-Ed    Scap retract w/ sh ER         Horizontal abduction shoulder stretch         Chicken wings         Supine sciatic never glide          Seated sciatic nevre glide                            Ther Ex    ANTONY counter Forearm wall 2 x 10    Forearm on wall - 5x peripheralized symptoms Forearm on wall 5 sec 2x10  Counter 2 x 10  Forearm on wall 2 x 10    REIL  2x10  2x10   2x10   2x10 w/ strap Performed at home right before visit  10x10  10x10   Prolonged ext 2' 2'  10x10   Prolonged ext 2',2'   Lat shift correct L arm on wall 2x10 L arm wall 2x10 L arm wall  3x10 no effect on symptoms     Lumbar side bend stertch         Prone - elevated HOB  1', 1', 1', 1', 1' increasing height 1', 1', 1', 1' increasing height        Heel raise step b/l         Calf stretch step  :30x3 ea  :30x3 ea :30x3 ea     Piriformis stretch         Single knee to chest         Supine strap ITB stretch         Supine HS stretch  strap         S/l hip abduction         Iwona pose          Standing active HS stretch         LTR         Elliptical 5'  5'        U/l calf press  Seat7   30# 3x10 ea                         Ther Activity 7/29 7/31 7/17 7/22 7/24

## 2024-08-01 ENCOUNTER — HOSPITAL ENCOUNTER (OUTPATIENT)
Dept: MRI IMAGING | Facility: HOSPITAL | Age: 64
Discharge: HOME | End: 2024-08-01
Payer: COMMERCIAL

## 2024-08-01 DIAGNOSIS — M54.16 LUMBAR RADICULOPATHY: ICD-10-CM

## 2024-08-01 PROCEDURE — 72148 MRI LUMBAR SPINE W/O DYE: CPT

## 2024-08-05 ENCOUNTER — OFFICE VISIT (OUTPATIENT)
Dept: PHYSICAL THERAPY | Facility: CLINIC | Age: 64
End: 2024-08-05
Payer: COMMERCIAL

## 2024-08-05 DIAGNOSIS — M77.8 LEFT SHOULDER TENDONITIS: ICD-10-CM

## 2024-08-05 DIAGNOSIS — M54.16 RIGHT LUMBAR RADICULOPATHY: Primary | ICD-10-CM

## 2024-08-05 DIAGNOSIS — N94.89 SIMPLE ADNEXAL CYST GREATER THAN 1 CM IN DIAMETER IN POSTMENOPAUSAL PATIENT: Primary | ICD-10-CM

## 2024-08-05 DIAGNOSIS — M54.41 ACUTE RIGHT-SIDED LOW BACK PAIN WITH RIGHT-SIDED SCIATICA: ICD-10-CM

## 2024-08-05 DIAGNOSIS — N95.8 SIMPLE ADNEXAL CYST GREATER THAN 1 CM IN DIAMETER IN POSTMENOPAUSAL PATIENT: Primary | ICD-10-CM

## 2024-08-05 PROCEDURE — 97110 THERAPEUTIC EXERCISES: CPT

## 2024-08-05 PROCEDURE — 97112 NEUROMUSCULAR REEDUCATION: CPT

## 2024-08-06 ENCOUNTER — TELEPHONE (OUTPATIENT)
Dept: PAIN MEDICINE | Facility: CLINIC | Age: 64
End: 2024-08-06

## 2024-08-06 DIAGNOSIS — M79.18 MYOFASCIAL PAIN SYNDROME: Primary | ICD-10-CM

## 2024-08-06 NOTE — TELEPHONE ENCOUNTER
I would not recommend a 2nd LIANNA given the lack of relief with the 1st one.  Her lumbar spine MRI shows only mild arthritis and no issues with stenosis or nerve root impingement.    We can try a RT piriformis injection if patient is interested.  This is an injection for sciatic-like pain.    Also can consider chiropractic therapy.

## 2024-08-06 NOTE — TELEPHONE ENCOUNTER
PRE OP INSTRUCTIONS:  -If you are on prescription blood thinners, you may have to hold the medication for several days before the procedure.    Please call the office to discuss medication holds at [phone].  -Do not eat or drink ONE HOUR prior to your procedure. If you are diabetic, may follow regular breakfast/lunch schedule and take usual    diabetic medications.  -Lumbar( low back) procedure, please wear comfortable slacks/pants.  -Cervical (neck) procedure, please wear a shirt/blouse that is easy to remove.  -A  is required to take you home form your procedure.  -Continue all to take prescribed medication the day of your procedure, including blood pressure medications.  -If you are prescribe antibiotics, have an active infection or have an open wound, please contact the office at [phone].  -Please refrain from any vaccinations two weeks before and two weeks after injection.  -Insurance authorization received in not a guarantee of payment per your insurance company's authorization disclaimer and it is    your responsibility to verify your benefits.          -If you have any questions about the instructions, please call me at [phone]          -PATIENT INSTRUCTED TO STOP ALL NSAID'S 4 DAYS PRIOR TO PROCEDURE            EXCEPT FOR IBUPROFEN IT CAN BE TAKEN UP TO 24 HOURS PRIOR TO PROCEDURE.

## 2024-08-06 NOTE — TELEPHONE ENCOUNTER
S/w pt, advised of above. Pt verbalized agreement with piriformis injection. Believes it is a procedure she had in the past. Advised pt, the writer will make MG aware. Anticipate a cb from [facility] coord to schedule as discussed. Pt verbalized understanding and appreciation.

## 2024-08-06 NOTE — TELEPHONE ENCOUNTER
----- Message from Cherry Mcrae PA-C sent at 8/5/2024  8:06 AM EDT -----  Please let patient know that her lumbar spine MRI shows stable mild degenerative changes.  There is an incidental finding of a cyst in the right pelvic region.  A pelvic ultrasound is recommended and I will place the order.

## 2024-08-07 ENCOUNTER — HOSPITAL ENCOUNTER (OUTPATIENT)
Dept: ULTRASOUND IMAGING | Facility: HOSPITAL | Age: 64
Discharge: HOME | End: 2024-08-07
Payer: COMMERCIAL

## 2024-08-07 ENCOUNTER — OFFICE VISIT (OUTPATIENT)
Dept: PHYSICAL THERAPY | Facility: CLINIC | Age: 64
End: 2024-08-07
Payer: COMMERCIAL

## 2024-08-07 DIAGNOSIS — M54.16 RIGHT LUMBAR RADICULOPATHY: Primary | ICD-10-CM

## 2024-08-07 DIAGNOSIS — M54.41 ACUTE RIGHT-SIDED LOW BACK PAIN WITH RIGHT-SIDED SCIATICA: ICD-10-CM

## 2024-08-07 DIAGNOSIS — N94.89 SIMPLE ADNEXAL CYST GREATER THAN 1 CM IN DIAMETER IN POSTMENOPAUSAL PATIENT: ICD-10-CM

## 2024-08-07 DIAGNOSIS — M77.8 LEFT SHOULDER TENDONITIS: ICD-10-CM

## 2024-08-07 DIAGNOSIS — N95.8 SIMPLE ADNEXAL CYST GREATER THAN 1 CM IN DIAMETER IN POSTMENOPAUSAL PATIENT: ICD-10-CM

## 2024-08-07 PROCEDURE — 76856 US EXAM PELVIC COMPLETE: CPT

## 2024-08-07 PROCEDURE — 76830 TRANSVAGINAL US NON-OB: CPT

## 2024-08-07 PROCEDURE — 97110 THERAPEUTIC EXERCISES: CPT | Performed by: PHYSICAL THERAPIST

## 2024-08-07 NOTE — PROGRESS NOTES
Daily Note     Today's date: 2024  Patient name: Reba Mirza  : 1960  MRN: [id number]  Referring provider: Brina Ramirez, *  Dx:   Encounter Diagnosis     ICD-10-CM    1. Right lumbar radiculopathy  M54.16       2. Left shoulder tendonitis  M77.8       3. Acute right-sided low back pain with right-sided sciatica  M54.41              Subjective: Pt reported that she has been having significant increase in pain in her calf with walking and most activities today.    Objective: See treatment diary below    Assessment: Tolerated treatment well. Patient demonstrated fatigue post treatment, exhibited good technique with therapeutic exercises, and would benefit from continued PT    Plan: Continue per plan of care.      Precautions:   Past Medical History:   Diagnosis Date    No known problems      Manuals    R HS, ITB stretch      JZ gentle    STM   R calf     R calf    R u/l TP PA mob  JZ                 Neuro Re-Ed    Scap retract w/ sh ER         Horizontal abduction shoulder stretch         Chicken wings         Supine sciatic never glide          Seated sciatic nevre glide          TA iso   :30x3 60-90 mmhg       TA march lvl 1    60-90 mmhg 2x10       TA march lvl 2    60-90 mmhg 2x10       Ther Ex    ANTONY counter Forearm wall 2 x 10    10x increased R calf pain  Counter 2 x 10  Forearm on wall 2 x 10    REIL  2x10  2x10   2x10   2x10 w/ strap Performed at home right before visit 3x10 2x10  2x10   2x10   10x10   Prolonged ext 2',2'   Lat shift correct L arm on wall 2x10 L arm wall 2x10 L arm wall 2x10 L arm wall      Lumbar side bend stertch         Prone - elevated HOB  1', 1', 1', 1', 1' increasing height 1', 1', 1', 1' increasing height   1', 1', 1', 1' increasing height      Heel raise step b/l         Calf stretch step  :30x3 ea :30x3 ea :30x3      Piriformis stretch         TB bridge    Y H/C 3x10       S/l clamshells   Y  H/C 2x10 ea       Single knee to chest         Supine strap ITB stretch         Supine HS stretch strap         S/l hip abduction         Iwona pose          Standing active HS stretch         LTR         Elliptical 5'  5'  5' 5'      U/l calf press  Seat7   30# 3x10 ea Seat7   30# 3x10 ea      Lizard pose    :20x3 ea     Roma pose                   Ther Activity 7/29 7/31 8/5 8/7 7/24

## 2024-08-12 ENCOUNTER — OFFICE VISIT (OUTPATIENT)
Dept: PHYSICAL THERAPY | Facility: CLINIC | Age: 64
End: 2024-08-12
Payer: COMMERCIAL

## 2024-08-12 DIAGNOSIS — M54.16 RIGHT LUMBAR RADICULOPATHY: Primary | ICD-10-CM

## 2024-08-12 DIAGNOSIS — M54.41 ACUTE RIGHT-SIDED LOW BACK PAIN WITH RIGHT-SIDED SCIATICA: ICD-10-CM

## 2024-08-12 DIAGNOSIS — M77.8 LEFT SHOULDER TENDONITIS: ICD-10-CM

## 2024-08-12 PROCEDURE — 97110 THERAPEUTIC EXERCISES: CPT

## 2024-08-12 NOTE — PROGRESS NOTES
Daily Note     Today's date: 2024  Patient name: Reba Mirza  : 1960  MRN: [id number]  Referring provider: Brina Ramirez, *  Dx:   Encounter Diagnosis     ICD-10-CM    1. Right lumbar radiculopathy  M54.16       2. Left shoulder tendonitis  M77.8       3. Acute right-sided low back pain with right-sided sciatica  M54.41                Subjective: Reba reports she continues to have heel and gastroc pain/tension with walking. Repeated extension performed at home, temporary relief but not long lasting.       Objective: See treatment diary below    Assessment: Reba tolerated PT treatment well. Continued with repeated extension in lying and prone elevation. Pt reporting improvement in RLE radicular symptoms following. Encouraged increase frequency of HEP d/t favorable response. Pt would benefit from continued PT to further address impairments and maximize functional level.       Plan: Continue per plan of care.      Precautions:   Past Medical History:   Diagnosis Date    No known problems      Manuals     R HS, ITB stretch         STM   R calf        R u/l TP PA mob  JZ                 Neuro Re-Ed     Scap retract w/ sh ER         Horizontal abduction shoulder stretch         Chicken wings         Supine sciatic never glide          Seated sciatic nevre glide          TA iso   :30x3 60-90 mmhg       TA march lvl 1    60-90 mmhg 2x10       TA march lvl 2    60-90 mmhg 2x10       Ther Ex     ANTONY counter Forearm wall 2 x 10    10x increased R calf pain     REIL  2x10  2x10   2x10   2x10 w/ strap Performed at home right before visit 3x10 2x10  2x10   2x10  2x10  2x10   2x10     Lat shift correct L arm on wall 2x10 L arm wall 2x10 L arm wall 2x10 L arm wall      Lumbar side bend stertch         Prone - elevated HOB  1', 1', 1', 1', 1' increasing height 1', 1', 1', 1' increasing height   1', 1', 1', 1' increasing height  1', 1', 1', 1'  increasing height  w/ MH    Heel raise step b/l         Calf stretch step  :30x3 ea :30x3 ea :30x3  :30x3     Piriformis stretch         TB bridge    Y H/C 3x10       S/l clamshells   Y H/C 2x10 ea       Single knee to chest         Supine strap ITB stretch         Supine HS stretch strap         S/l hip abduction         Iwona pose          Standing active HS stretch         LTR         Elliptical 5'  5'  5' 5'  5'    U/l calf press  Seat7   30# 3x10 ea Seat7   30# 3x10 ea      Lizard pose    :20x3 ea     Roberts pose                   Ther Activity 7/29 7/31 8/5 8/7 8/12

## 2024-08-13 ENCOUNTER — TELEPHONE (OUTPATIENT)
Dept: PAIN MEDICINE | Facility: CLINIC | Age: 64
End: 2024-08-13

## 2024-08-13 NOTE — TELEPHONE ENCOUNTER
----- Message from Cherry Mcrae PA-C sent at 8/12/2024  8:34 AM EDT -----  Please let patient know her pelvic ultrasound shows a simple ovarian cyst.  Radiology states no further recommendations.

## 2024-08-13 NOTE — TELEPHONE ENCOUNTER
S/w pt, advised of above. Pt verbalized understanding and appreciation. Per pt, she is scheduled for an ov on 9/6/24 as well as a procedure on 9/9/24. Pt confirmed that [facility] does not prescribe medications. Questioned if she should move this appt to after her procedure. Advised pt, the writer will d/w MG and this office will cb to advise. Pt verbalized understanding and appreciation.     Forwarding to [facility] clerical staff, please reschedule 9/6/24 ov for 1st or 2nd week in october.

## 2024-08-14 ENCOUNTER — PROCEDURE VISIT (OUTPATIENT)
Dept: DERMATOLOGY | Facility: CLINIC | Age: 64
End: 2024-08-14
Payer: COMMERCIAL

## 2024-08-14 ENCOUNTER — APPOINTMENT (OUTPATIENT)
Dept: PHYSICAL THERAPY | Facility: CLINIC | Age: 64
End: 2024-08-14
Payer: COMMERCIAL

## 2024-08-14 VITALS — WEIGHT: 159 LBS | HEIGHT: 67 IN | BODY MASS INDEX: 24.96 KG/M2 | TEMPERATURE: 96.7 F

## 2024-08-14 DIAGNOSIS — D48.9 NEOPLASM OF UNCERTAIN BEHAVIOR: Primary | ICD-10-CM

## 2024-08-14 PROCEDURE — 12032 INTMD RPR S/A/T/EXT 2.6-7.5: CPT | Performed by: DERMATOLOGY

## 2024-08-14 PROCEDURE — 88305 TISSUE EXAM BY PATHOLOGIST: CPT | Performed by: STUDENT IN AN ORGANIZED HEALTH CARE EDUCATION/TRAINING PROGRAM

## 2024-08-14 PROCEDURE — 11402 EXC TR-EXT B9+MARG 1.1-2 CM: CPT | Performed by: DERMATOLOGY

## 2024-08-14 NOTE — PROGRESS NOTES
"[facility] Note     Patient Name: Reba Mirza  Encounter Date: 08/14/24     Have you been cared for by a [facility]ologist in the last 3 years and, if so, which description applies to you?    Yes.  I have been here within the last 3 years, and my medical history has NOT changed since that time.  I am FEMALE/of child-bearing potential.    REVIEW OF SYSTEMS:  Have you recently had or currently have any of the following? No changes in my recent health.   PAST MEDICAL HISTORY:  Have you personally ever had or currently have any of the following?  If \"YES,\" then please provide more detail. No changes in my medical history.   HISTORY OF IMMUNOSUPPRESSION: Do you have a history of any of the following:  Systemic Immunosuppression such as Diabetes, Biologic or Immunotherapy, Chemotherapy, Organ Transplantation, Bone Marrow Transplantation?  No     Answering \"YES\" requires the addition of the dotphrase \"IMMUNOSUPPRESSED\" as the first diagnosis of the patient's visit.   FAMILY HISTORY:  Any \"first degree relatives\" (parent, brother, sister, or child) with the following?    No changes in my family's known health.   PATIENT EXPERIENCE:    Do you want the Dermatologist to perform a COMPLETE skin exam today including a clinical examination under the \"bra and underwear\" areas?  NO  If necessary, do we have your permission to call and leave a detailed message on your Preferred Phone number that includes your specific medical information?  Yes      Allergies   Allergen Reactions    Pollen Extract     Sulfa Antibiotics GI Intolerance      Current Outpatient Medications:     erythromycin (ILOTYCIN) ophthalmic ointment, Administer 0.5 inches to both eyes daily at bedtime, Disp: 3.5 g, Rfl: 0    fluticasone (FLONASE) 50 mcg/act nasal spray, 2 sprays into each nostril daily, Disp: 16 mL, Rfl: 3    nystatin (MYCOSTATIN) powder, Apply topically 2 (two) times a day, Disp: 30 g, Rfl: 2    triamcinolone (KENALOG) " "0.1 % cream, Apply topically 2 (two) times a day Apply to affected area under the breasts, Disp: 15 g, Rfl: 2          Whom besides the patient is providing clinical information about today's encounter?   NO ADDITIONAL HISTORIAN (patient alone provided history)    Physical Exam and Assessment/Plan by Diagnosis:      PROCEDURE:  EXCISION NOTE     Procedural Plan:     Attending:   Assistant:  Kathy FIELDS  Lesion Anatomic Location (use description from previous biopsy if applicable): Central upper back; pink papule with scattered brown pigment and telangiectasias  Pre-Op Diagnosis: pigmented basal cell carcinoma   Suspected benign versus malignant: malignant  Accession Number of any associated previous biopsy/excision: [id number]    Written and verbal (witnessed) informed consent was obtained. We discussed that \"excision\" is a method of removing lesions, both benign and malignant lesions.  A portion of normal skin is often taken to ensure completeness of removal.  I reviewed that this procedure will include numbing the area, cutting around and under the skin lesion, undermining (\"freeing up\") surrounding tissue, and closing the wound with sutures (stitches) both inside and out.  Risks include and are not limited to the following:  Bleeding, pain, infection, scarring, recurrence, more required treatment, no additional information, numbness and/or loss of function (if nerves are damaged).  These risks were considered against the benefits that we discussed, and the patient opted to continue with the procedure. It was discussed with patient that every effort is made to minimize scarring, but scarring is influenced also by extrinsic factor such as location, age and genetics.     Procedural Time Out:      Correct patient? no  Correct site per Clinic Report? yes  Correct site per previous Path Report? yes  Correct site per Patient's recollection? no    Anesthesia:      Local anesthesia: 3:1 1% xylocaine with epi and " 1-100,000 buffered    Excision Description:      Post-Op Diagnosis: Same as Pre-Op Diagnosis (above)  Pre-op size: 0.6 cm x 1.0 cm   Margins:  0.5 cm   Final size of surgical defect:   1.6 cm  x 2.0  cm     The patient was seated in the procedure/exam room, anesthetized locally, prepped and draped in the usual fashion. Using a #15 blade with a scalpel, the lesion was excised in elliptical fashion.     REQUIRED Dara MELANOMA DATA      This procedure was not performed to treat primary cutaneous melanoma through wide local excision      Closure Description:      The specific type of closure that was utilized:     INTERMEDIATE Closure  INTERMEDIATE CLOSURE     The patient was brought back into the procedure room, anesthetized locally, prepped and draped in the usual fashion. Using a #15 blade with a scalpel, the lesion was excised in elliptical fashion. The wound was  undermined in the fascial plane.  Purpose of undermining was to decrease wound tension and facilitate closure. Hemostasis was achieved with light electrocoagulation.    The wound was closed with subcutaneous sutures as follows:    Deep suture: 3-0 vicryl  Running Subcuticular suture: 3-0 prolene     Epidermal edge closure was accomplished with superficial sutures as follows:    Superficial suture: 3-0 Prolene  Superficial suture type: simple interrupted       Final repaired wound length:  5cm      Postoperative Care:      The wound was cleaned with sterile saline, dried off, surgical vaseline ointment was applied, and the wound was covered. A pressure dressing was applied for stabilization and light pressure.    Estimated blood loss:  Less than 3ml.  Complications: none  Post-op medications: none  Additional notes: none    Discharge Plans:      Discharge plans: Plan for return to us for suture removal, as scheduled in 14 days.   Patient condition at discharge: STABLE    The patient was given detailed oral and written instructions on postoperative care as  "detailed in consent. We urged the patient to call us if any problems or question should arise.         Please complete this section and then \"cut and paste\" it into the Patient Instructions section.  These notes should be printed and shared directly with the patient for review PRIOR TO leaving our office.                   BCC excised with 0.5cm margins for 2cm excision and 5cm closure.  Well tolerated.  S/R in 2 weeks.  Scribe Attestation      I,:  Kathy Patel MA am acting as a scribe while in the presence of the attending physician.:       I,:  Ramesh Rojas MD personally performed the services described in this documentation    as scribed in my presence.:            "

## 2024-08-14 NOTE — PATIENT INSTRUCTIONS
"YOUR \"AFTER SURGERY\" REVIEW & INSTRUCTIONS    What to Know About Your Procedure  An \"excision\" was performed today to allow the dermatologist to remove a skin lesion. The procedure involves a local numbing medication and removing the entire lesion (or as much as possible). Typically, the lesion is being removed because it does not look \"normal,\" because it is becoming irritated and traumatized, or for significant appearance reasons.  The skin was cut deeply and then repaired - usually with sutures (stitches).  The removed tissue has been sent to the pathologist who will confirm the diagnosis and verify if the lesion has been completely removed.  Surgical “Vaseline-type” ointment has been applied after the procedure to help create a barrier between your wound and the outside world.     The advantage of using sutures (stitches) to repair a skin excision is that it allows the lesion to heal as quickly as possible with the least amount of scarring and risk of infection, Still, there are some risks and potential complications that you should watch out for that include but are not limited to the following:    Some bleeding is normal at the time of procedure and some bleeding on the gauze bandage after the procedure is normal for the first few days after surgery.  Profuse bleeding or bleeding with swelling and pain is NOT normal and should be reported as detailed below.  Infection is uncommon after skin surgery.  Infection should be reported and is indicated by pain, redness, and discharge of purulent material.  Some pain may occur initially the day after surgery.  Persistent pain or increasing pain days after surgery is not expected and should be reported.  Every effort is made to minimize scar, but location, size, and genetics do play a role in scar appearance.  A surgical wound does not achieve its optimal appearance until 6 months.  There are several treatments available if scarring would be problematic including scar " "creams, silicone pad, laser and scar revision.  Skin discoloration can occur especially in people of color.  Its important to avoid sun on wound in first 6 months after surgery.  Treatment is available if pigment is problematic.  Incomplete removal of the lesion or recurrence of lesion can occur and - depending on the lesion - this would then require further treatment and more surgery.  Nerve damage/numbness and/or loss of function is very rare, but is most likely to occur if the lesion being removed is large or if it is in a \"high risk\" location.  Allergic reaction to lidocaine is rare.  More commonly, epinephrine is used with the lidocaine, and, occasionally, epinephrine (a.k.a., adrenalin) may cause a brief feeling of anxiety or jitteriness.  The person at the microscope (pathologist) may provide additional information that was unexpected. This unexpected finding could prompt the need for additional treatment or evaluation.    At-Home Wound Care  Try NOT to remove the pressure bandage for 48 hours. Keep the area clean and dry while this bandage is on.   After removing the bandage for the first time, gently clean the area with soap and water. If the bandage is difficult to remove, getting the bandage wet in the shower will sometimes help soften the adhesive and allow it to be removed more easily.   You will now need to cleanse this area daily in the shower with gentle soap. There is no need to scrub the area. You will need to apply plain Vaseline ointment (this is over the counter and not a prescription) to the site for up to 2 weeks followed by a clean appropriately sized bandage to area.  Non stick dressing and paper tape (or Hypafix) are recommended for sensitive skin but a bandaid is fine if it covers the area well.  In general, sutures (stitches) are removed in about 5-7 days for face wounds and in about 12-14 days for the body wounds.  Your dermatologist wants you to return for suture removal in 14 DAYS. " "    General Restrictions  For TWO (2) DAYS:  You will need to take it very easy as this time is highest risk for bleeding. Being a \"couch potato\" during these two days is generally recommended.   For surgeries on the face/neck/scalp: Avoid leaning down to pick things up off the floor as this brings blood up to your head. Instead, squat down to pick things up.     For TWO WEEKS (14 DAYS):   No heavy lifting (anything greater than 10 pounds)   You can start to do slow, gentle activities such as slow walking but nothing to increase your heart rate and blood pressure too much (such as cardiovascular exercise).  It is important to take it easy as there is still a risk for bleeding and a high risk popping of stitches open during this time.     Site Specific Restrictions  If we did surgery near your eyes (including the nose, forehead, front part of your scalp, cheeks): It is VERY common to get a large amount of swelling around your eyes (puffy eyes). Although less frequent, this can be enough to swell your eyes shut and can also come along with bruising. This should not hurt and is very expected and normal. It is typically worst at ~ 3 days out from your surgery and dramatically better 1 week post-operatively.   If we did surgery around your nose: No blowing your nose as this puts you at higher risk of popping stitches durign this time. Instead dab under your nose with a tissue or use a Q-tip inside your nose.  If we did surgery on the skin above or below your lip or your lip itself: No sipping from straws as this uses a lot of the muscles around your mouth and increases the risk of popping stitches during this time.    Managing Your Pain After Surgery  You can expect to have some pain after surgery. This is normal. The pain is typically worse the first two days after surgery, and quickly begins to get better.   You can use heating pads or ice packs on your incisions to help reduce your pain.   The best strategy for " "controlling your pain after surgery is \"around the clock\" pain control. You can take \"over-the-counter\" (non-prescription) Acetaminophen (Tylenol) for discomfort, unless you have been told not to by your physician.  If you are taking Tylenol at the maximum dose, you can alternate Tylenol with Advil/Motrin (ibuprofen) as long as there are no contraindications.  Alternating these medications with each other (I.e., Tylenol followed by Motrin/Advil) allows you to maximize your pain control.  To alternate these medications properly, you will take a dose of pain medication every three hours, alternating Tylenol (acetaminophen) and Advil/Motrin (ibuprofen).  Start by taking 650 mg of Tylenol (2 pills of 325 mg)  3 hours later take 600 mg of Motrin (3 pills of 200 mg)  3 hours after taking the Motrin take 650 mg of Tylenol  3 hours after that take 600 mg of Motrin.    As an example, if your first dose of Tylenol (acetaminophen) is at 12:00 PM, then you would alternate with Motrin as directed below, continuing usually for no more than a total of 48 hours straight:     12:00 PM  Tylenol 650 mg (2 pills of 325 mg)    3:00 PM  Motrin 600 mg (3 pills of 200 mg)    6:00 PM  Tylenol 650 mg (2 pills of 325 mg)    9:00 PM  Motrin 600 mg (3 pills of 200 mg)      WARNING:  Do NOT take more than 4000 mg of Tylenol or 3200 mg of Motrin in a \"24-hour\" period.       What if you still have pain?    If you have pain that is not controlled with the over-the-counter pain medications (Tylenol and Motrin/Advil), do not hesitate to call our staff using the number provided. We will help make sure you are managing your pain in the best way possible, and if necessary, we can provide a prescription for additional pain medication.     Call our office IMMEDIATELY with any signs of wound infection.  This includes fever, chills, increasing redness, warmth, tenderness, severe discomfort/pain, or pus or foul smell coming from the wound. [facility] can be directly at [phone] (SKIN) and ask for the on-call Dermatologist covering surgery/Mohs.    If Bleeding is Noticed  If bleeding is soaking through the bandage, remove the bandage and see where the bleeding is coming from.  Place a clean cloth over the area and apply firm pressure directly to the area that is bleeding for thirty minutes.    Check the wound ONLY after 30 minutes of direct pressure; do not cheat and sneak a peak, as that does not count (i.e., resets the clock back to zero).  If bleeding persists after 30 minutes of legitimate direct pressure, then try one more round of direct pressure to the area.    Should bleeding become heavier or not stop after the second application of direct pressure for 30 minutes, then call [facility] directly at [phone] (SKIN) and ask to speak with the on-call Dermatologist covering surgery/Mohs.  If after hours, go to your nearest Emergency Room or Urgent Care and have the team call [facility] directly at [phone] (SKIN); you will be connected to our after hours team.

## 2024-08-15 ENCOUNTER — OFFICE VISIT (OUTPATIENT)
Dept: PHYSICAL THERAPY | Facility: CLINIC | Age: 64
End: 2024-08-15
Payer: COMMERCIAL

## 2024-08-15 DIAGNOSIS — M77.8 LEFT SHOULDER TENDONITIS: ICD-10-CM

## 2024-08-15 DIAGNOSIS — M54.41 ACUTE RIGHT-SIDED LOW BACK PAIN WITH RIGHT-SIDED SCIATICA: ICD-10-CM

## 2024-08-15 DIAGNOSIS — M54.16 RIGHT LUMBAR RADICULOPATHY: Primary | ICD-10-CM

## 2024-08-15 PROCEDURE — 97110 THERAPEUTIC EXERCISES: CPT

## 2024-08-15 NOTE — PROGRESS NOTES
Daily Note     Today's date: 8/15/2024  Patient name: Reba Mirza  : 1960  MRN: [id number]  Referring provider: Brina Ramirez, *  Dx:   Encounter Diagnosis     ICD-10-CM    1. Right lumbar radiculopathy  M54.16       2. Left shoulder tendonitis  M77.8       3. Acute right-sided low back pain with right-sided sciatica  M54.41           Start Time: 1615  Stop Time: 1700  Total time in clinic (min): 45 minutes      Subjective: Prior to PT Reba reports 6/10 pain into calf and heel. She presents with a bandage at CTJ post mole removal. Surgeon advise to avoid UE mid trap activation 2* to sutures.     Objective: See treatment diary below    Assessment: Reba continue good response to extension based TE. Prone press-ups held this visit 2* to surg site. Patient would benefit from continued PT to improve function.       Plan: Continue per plan of care.      Precautions:   Past Medical History:   Diagnosis Date    No known problems      Manuals 7/29 7/31 8/5 8/7 8/12 8/15   R HS, ITB stretch         STM   R calf        R u/l TP PA mob  JZ                 Neuro Re-Ed 7/29 7/31 8/5 8/7 8/12 8/15   Scap retract w/ sh ER         Horizontal abduction shoulder stretch         Chicken wings         Supine sciatic never glide          Seated sciatic nevre glide          TA iso   :30x3 60-90 mmhg       TA march lvl 1    60-90 mmhg 2x10       TA march lvl 2    60-90 mmhg 2x10       Ther Ex 7/29 7/31 8/5 8/7 8/12 8/15   ANTONY counter Forearm wall 2 x 10    10x increased R calf pain     REIL  2x10  2x10   2x10   2x10 w/ strap Performed at home right before visit 3x10 2x10  2x10   2x10  2x10  2x10   2x10  HELD    Hips to wall performed this visit 2x10   Lat shift correct L arm on wall 2x10 L arm wall 2x10 L arm wall 2x10 L arm wall      Lumbar side bend stertch         Prone - elevated HOB  1', 1', 1', 1', 1' increasing height 1', 1', 1', 1' increasing height   1', 1', 1', 1' increasing height  1', 1', 1', 1' increasing  height  w/ MH 1', 1', 1', 1' increasing height  w/ MH   Heel raise step b/l         Calf stretch step  :30x3 ea :30x3 ea :30x3  :30x3  :30x3 ea   Piriformis stretch         TB bridge    Y H/C 3x10       S/l clamshells   Y H/C 2x10 ea       Single knee to chest         Supine strap ITB stretch         Supine HS stretch strap         S/l hip abduction         [place] pose          Standing active HS stretch         LTR         Elliptical 5'  5'  5' 5'  5' 5'   U/l calf press  Seat7   30# 3x10 ea Seat7   30# 3x10 ea      Lizard pose    :20x3 ea     [place] pose                   Ther Activity 7/29 7/31 8/5 8/7 8/12 8/15

## 2024-08-19 ENCOUNTER — OFFICE VISIT (OUTPATIENT)
Dept: PHYSICAL THERAPY | Facility: CLINIC | Age: 64
End: 2024-08-19
Payer: COMMERCIAL

## 2024-08-19 DIAGNOSIS — M77.8 LEFT SHOULDER TENDONITIS: ICD-10-CM

## 2024-08-19 DIAGNOSIS — M54.16 RIGHT LUMBAR RADICULOPATHY: Primary | ICD-10-CM

## 2024-08-19 DIAGNOSIS — M54.41 ACUTE RIGHT-SIDED LOW BACK PAIN WITH RIGHT-SIDED SCIATICA: ICD-10-CM

## 2024-08-19 PROCEDURE — 97110 THERAPEUTIC EXERCISES: CPT | Performed by: PHYSICAL THERAPIST

## 2024-08-19 PROCEDURE — 97140 MANUAL THERAPY 1/> REGIONS: CPT | Performed by: PHYSICAL THERAPIST

## 2024-08-19 NOTE — PROGRESS NOTES
Daily Note     Today's date: 2024  Patient name: Reba Mirza  : 1960  MRN: [id number]  Referring provider: Brina Ramirez, *  Dx:   Encounter Diagnosis     ICD-10-CM    1. Right lumbar radiculopathy  M54.16       2. Left shoulder tendonitis  M77.8       3. Acute right-sided low back pain with right-sided sciatica  M54.41              Subjective: Pt reported that she has 10 stitches in her back after having a mole removed on her skin of her thoracic region. Noted that the stitches are scheduled to be removed on 24.     Objective: See treatment diary below    Assessment: Tolerated treatment well. Patient demonstrated fatigue post treatment, exhibited good technique with therapeutic exercises, and would benefit from continued PT    Plan: Continue per plan of care.      Precautions:   Past Medical History:   Diagnosis Date    No known problems      Manuals 8/19  8/5 8/7 8/12 8/15   R HS, ITB stretch         STM          R u/l TP PA mob JZ                          Neuro Re-Ed 8/19  8/5 8/7 8/12 8/15   Supine sciatic never glide          Seated sciatic nevre glide          TA iso   :30x3 60-90 mmhg       TA march lvl 1    60-90 mmhg 2x10       TA march lvl 2    60-90 mmhg 2x10       Prone hip ext isometric                                    Ther Ex 8/19  8/5 8/7 8/12 8/15   ANTONY counter    10x increased R calf pain     REIL  Forearms on wall lumbar extension 2x10   3x10 2x10  2x10   2x10  2x10  2x10   2x10  HELD    Hips to wall performed this visit 2x10   Lat shift correct L arm on wall   2x10 L arm wall      Long sit plantar fascia stretch towel :30x3 ea        Prone - elevated HOB  1', 1', 1', 1' increasing height  w/ MH   1', 1', 1', 1' increasing height  1', 1', 1', 1' increasing height  w/ MH 1', 1', 1', 1' increasing height  w/ MH   Heel raise step b/l 3x10         Calf stretch step :30x3 ea  :30x3 ea :30x3  :30x3  :30x3 ea   Seated HS stretch  :30x3 ea        Stand HS stretch                    Single knee to chest         Standing active HS stretch         LTR         Elliptical 5'   5' 5'  5' 5'   U/l calf press 30#  3x10 ea  Seat7   30# 3x10 ea      Lizard pose    :20x3 ea     [place] pose                   Ther Activity 8/19  8/5 8/7 8/12 8/15

## 2024-08-21 ENCOUNTER — OFFICE VISIT (OUTPATIENT)
Dept: PHYSICAL THERAPY | Facility: CLINIC | Age: 64
End: 2024-08-21
Payer: COMMERCIAL

## 2024-08-21 DIAGNOSIS — M54.16 RIGHT LUMBAR RADICULOPATHY: Primary | ICD-10-CM

## 2024-08-21 DIAGNOSIS — M54.41 ACUTE RIGHT-SIDED LOW BACK PAIN WITH RIGHT-SIDED SCIATICA: ICD-10-CM

## 2024-08-21 DIAGNOSIS — M77.8 LEFT SHOULDER TENDONITIS: ICD-10-CM

## 2024-08-21 PROCEDURE — 97110 THERAPEUTIC EXERCISES: CPT

## 2024-08-21 NOTE — PROGRESS NOTES
Daily Note     Today's date: 2024  Patient name: Reba Mirza  : 1960  MRN: [id number]  Referring provider: Brina Ramirez, *  Dx:   Encounter Diagnosis     ICD-10-CM    1. Right lumbar radiculopathy  M54.16       2. Left shoulder tendonitis  M77.8       3. Acute right-sided low back pain with right-sided sciatica  M54.41                Subjective: Reba reports increased R heel pain and tightness throughout RLE upon arrival. Notes relief of radicular symptoms following PT session, but temporary.     Objective: See treatment diary below    Assessment: Reba tolerated PT treatment well. Continues to respond favorably to extension bias, particularly with sustained positioning. Limited hamstring flexibility observed on RLE with self stretching. Fatigues fairly quickly with PF strengthening. Pt would benefit from continued PT to further address impairments and maximize functional level.      Plan: Continue per plan of care.      Precautions:   Past Medical History:   Diagnosis Date    No known problems      Manuals 8/19 8/21  8/7 8/12 8/15   R HS, ITB stretch         STM          R u/l TP PA mob JZ                          Neuro Re-Ed 8/19 8/21  8/7 8/12 8/15   Supine sciatic never glide          Seated sciatic nevre glide          TA iso         TA march lvl 1          TA march lvl 2          Prone hip ext isometric                                    Ther Ex 8/19 8/21  8/7 8/12 8/15   ANTONY counter    10x increased R calf pain     REIL  Forearms on wall lumbar extension 2x10  Forearms on wall lumbar extension 2x10   2x10  2x10   2x10  2x10  2x10   2x10  HELD    Hips to wall performed this visit 2x10   Lat shift correct L arm on wall         Long sit plantar fascia stretch towel :30x3 ea :30x3 ea       Prone - elevated HOB  1', 1', 1', 1' increasing height  w/ MH 1', 1', 1', 1' increasing height  w/ MH  1', 1', 1', 1' increasing height  1', 1', 1', 1' increasing height  w/ MH 1', 1', 1', 1' increasing  height  w/ MH   Heel raise step b/l 3x10  3x10        Calf stretch step :30x3 ea :30x3 ea  :30x3  :30x3  :30x3 ea   Seated HS stretch  :30x3 ea :30x3 ea        Stand HS stretch                   Single knee to chest         Standing active HS stretch         LTR         Elliptical 5'    5'  5' 5'   U/l calf press 30#  3x10 ea 30# 3x10 ea       Lizard pose    :20x3 ea     [place] pose                   Ther Activity 8/19 8/21  8/7 8/12 8/15

## 2024-08-26 ENCOUNTER — OFFICE VISIT (OUTPATIENT)
Dept: PHYSICAL THERAPY | Facility: CLINIC | Age: 64
End: 2024-08-26
Payer: COMMERCIAL

## 2024-08-26 DIAGNOSIS — M54.16 RIGHT LUMBAR RADICULOPATHY: Primary | ICD-10-CM

## 2024-08-26 DIAGNOSIS — M54.41 ACUTE RIGHT-SIDED LOW BACK PAIN WITH RIGHT-SIDED SCIATICA: ICD-10-CM

## 2024-08-26 DIAGNOSIS — M77.8 LEFT SHOULDER TENDONITIS: ICD-10-CM

## 2024-08-26 PROCEDURE — 97110 THERAPEUTIC EXERCISES: CPT | Performed by: PHYSICAL THERAPIST

## 2024-08-26 NOTE — PROGRESS NOTES
Daily Note     Today's date: 2024  Patient name: Reba Mirza  : 1960  MRN: [id number]  Referring provider: Brina Ramirez, *  Dx:   Encounter Diagnosis     ICD-10-CM    1. Right lumbar radiculopathy  M54.16       2. Left shoulder tendonitis  M77.8       3. Acute right-sided low back pain with right-sided sciatica  M54.41              Subjective: Pt reported that she is still experiencing similar symptoms. Notes no significant changes in pain when she is prolonged sitting at work.     Objective: See treatment diary below    Assessment: Tolerated treatment well. Patient demonstrated fatigue post treatment, exhibited good technique with therapeutic exercises, and would benefit from continued PT    Plan: Continue per plan of care.      Precautions:   Past Medical History:   Diagnosis Date    No known problems      Manuals 8/19 8/21 8/26  8/12 8/15   R HS, ITB stretch         STM          R u/l TP PA mob JZ                          Neuro Re-Ed 8/19 8/21 8/26  8/12 8/15   Supine sciatic never glide          Seated sciatic nevre glide          TA iso         TA march lvl 1          TA march lvl 2          Prone hip ext isometric                                    Ther Ex 8/19 8/21 8/26  8/12 8/15   ANTONY counter   2x10      REIL  Forearms on wall lumbar extension 2x10  Forearms on wall lumbar extension 2x10  Forearms on wall lumbar extension 2x10   2x10  2x10   2x10  HELD    Hips to wall performed this visit 2x10   Stand side bend stretch   5 sec x 10 ea      Long sit plantar fascia stretch towel :30x3 ea :30x3 ea       Prone - elevated HOB  1', 1', 1', 1' increasing height  w/ MH 1', 1', 1', 1' increasing height  w/ MH 1', 1', 1  Increasing height   1', 1', 1', 1' increasing height  w/ MH 1', 1', 1', 1' increasing height  w/ MH   Heel raise step b/l 3x10  3x10        Calf stretch step :30x3 ea :30x3 ea :30x3 ea  :30x3  :30x3 ea   Seated HS stretch  :30x3 ea :30x3 ea  :30x3 ea      Stand HS stretch                    Single knee to chest   :30x3       Standing active HS stretch         LTR         Elliptical 5'   5'   5' 5'   U/l calf press 30#  3x10 ea 30# 3x10 ea Hold       Lizard pose         [place] pose                   Ther Activity 8/19 8/21 8/26  8/12 8/15

## 2024-08-28 ENCOUNTER — CLINICAL SUPPORT (OUTPATIENT)
Dept: DERMATOLOGY | Facility: CLINIC | Age: 64
End: 2024-08-28

## 2024-08-28 ENCOUNTER — APPOINTMENT (OUTPATIENT)
Dept: PHYSICAL THERAPY | Facility: CLINIC | Age: 64
End: 2024-08-28
Payer: COMMERCIAL

## 2024-08-28 DIAGNOSIS — Z48.02 VISIT FOR SUTURE REMOVAL: Primary | ICD-10-CM

## 2024-08-28 PROCEDURE — RECHECK: Performed by: DERMATOLOGY

## 2024-08-28 NOTE — PROGRESS NOTES
Suture removal    Date/Time: 8/28/2024 3:45 PM    Performed by: Stephanie Myles MA  Authorized by: Med Mendez MD  Universal Protocol:  procedure performed by consultantConsent: Verbal consent obtained.  Consent given by: patient  Patient understanding: patient states understanding of the procedure being performed  Patient consent: the patient's understanding of the procedure matches consent given  Procedure consent: procedure consent matches procedure scheduled  Test results available and properly labeled: Pathology results were available and a copy was given to patient.  Patient identity confirmed: verbally with patient      Patient location:  Clinic  Location:     Anesthesia laterality: Central upper.    Location:  Trunk    Trunk location:  Back  Procedure details:     Tools used:  Suture removal kit    Wound appearance:  No sign(s) of infection, good wound healing and clean    Number of sutures removed:  9  Post-procedure details:     Post-procedure assessment: Vaseline and gauze applied.    Patient tolerance of procedure:  Tolerated well, no immediate complications

## 2024-09-06 DIAGNOSIS — J30.9 ALLERGIC RHINITIS, UNSPECIFIED: ICD-10-CM

## 2024-09-06 RX ORDER — FLUTICASONE PROPIONATE 50 MCG
2 SPRAY, SUSPENSION (ML) NASAL DAILY
Qty: 16 ML | Refills: 0 | Status: SHIPPED | OUTPATIENT
Start: 2024-09-06

## 2024-09-09 ENCOUNTER — HOSPITAL ENCOUNTER (OUTPATIENT)
Dept: RADIOLOGY | Facility: CLINIC | Age: 64
Discharge: HOME | End: 2024-09-09
Payer: COMMERCIAL

## 2024-09-09 VITALS
SYSTOLIC BLOOD PRESSURE: 121 MMHG | HEART RATE: 80 BPM | TEMPERATURE: 97.1 F | DIASTOLIC BLOOD PRESSURE: 79 MMHG | OXYGEN SATURATION: 94 % | RESPIRATION RATE: 18 BRPM

## 2024-09-09 DIAGNOSIS — M79.18 MYOFASCIAL PAIN SYNDROME: ICD-10-CM

## 2024-09-09 PROCEDURE — 77002 NEEDLE LOCALIZATION BY XRAY: CPT | Performed by: ANESTHESIOLOGY

## 2024-09-09 PROCEDURE — 20552 NJX 1/MLT TRIGGER POINT 1/2: CPT | Performed by: ANESTHESIOLOGY

## 2024-09-09 RX ORDER — ROPIVACAINE HYDROCHLORIDE 2 MG/ML
2 INJECTION, SOLUTION EPIDURAL; INFILTRATION; PERINEURAL ONCE
Status: COMPLETED | OUTPATIENT
Start: 2024-09-09 | End: 2024-09-09

## 2024-09-09 RX ORDER — METHYLPREDNISOLONE ACETATE 80 MG/ML
80 INJECTION, SUSPENSION INTRA-ARTICULAR; INTRALESIONAL; INTRAMUSCULAR; PARENTERAL; SOFT TISSUE ONCE
Status: COMPLETED | OUTPATIENT
Start: 2024-09-09 | End: 2024-09-09

## 2024-09-09 RX ADMIN — IOHEXOL 1 ML: 300 INJECTION, SOLUTION INTRAVENOUS at 08:49

## 2024-09-09 RX ADMIN — METHYLPREDNISOLONE ACETATE 80 MG: 80 INJECTION, SUSPENSION INTRA-ARTICULAR; INTRALESIONAL; INTRAMUSCULAR; SOFT TISSUE at 08:49

## 2024-09-09 RX ADMIN — ROPIVACAINE HYDROCHLORIDE 2 ML: 2 INJECTION EPIDURAL; INFILTRATION; PERINEURAL at 08:49

## 2024-09-09 NOTE — H&P
History of Present Illness: The patient is a 64 y.o. female who presents with complaints of buttock and leg pain.    Past Medical History:   Diagnosis Date    No known problems        Past Surgical History:   Procedure Laterality Date    HYSTERECTOMY      age 43    OOPHORECTOMY Left     age 43         Current Outpatient Medications:     erythromycin (ILOTYCIN) ophthalmic ointment, Administer 0.5 inches to both eyes daily at bedtime, Disp: 3.5 g, Rfl: 0    fluticasone (FLONASE) 50 mcg/act nasal spray, 2 sprays into each nostril daily, Disp: 16 mL, Rfl: 0    nystatin (MYCOSTATIN) powder, Apply topically 2 (two) times a day, Disp: 30 g, Rfl: 2    triamcinolone (KENALOG) 0.1 % cream, Apply topically 2 (two) times a day Apply to affected area under the breasts, Disp: 15 g, Rfl: 2    Current Facility-Administered Medications:     iohexol (OMNIPAQUE) 300 mg/mL injection 1 mL, 1 mL, Intra-articular, Once, Juan Padron DO    methylPREDNISolone acetate (DEPO-MEDROL) injection 80 mg, 80 mg, Intra-articular, Once, Juan Padron DO    ropivacaine (NAROPIN) injection 2 mL, 2 mL, Intra-articular, Once, Juan Padron DO    Allergies   Allergen Reactions    Pollen Extract     Sulfa Antibiotics GI Intolerance       Physical Exam:   General: Awake, Alert, Oriented x 3, Mood and affect appropriate  Respiratory: Respirations even and unlabored  Cardiovascular: Peripheral pulses intact; no edema  Musculoskeletal Exam: Slightly antalgic gait    ASA Score: II         Assessment:   1. Myofascial pain syndrome        Plan: RT piriformis inj

## 2024-09-09 NOTE — DISCHARGE INSTRUCTIONS
Do not apply heat to any area that is numb. If you have discomfort or soreness at the injection site, you may apply ice today, 20 minutes on and 20 minutes off. Tomorrow you may use ice or warm, moist heat. Do not apply ice or heat directly to the skin.  If you experience severe shortness of breath, go to the Emergency Room.  You may have numbness for several hours from the local anesthetic. Please use caution and common sense, especially with weight-bearing activities.  You may have an increase or change in the discomfort for 36-48 hours after your treatment. Apply ice and continue with any pain medicine you have been prescribed.  Do not do anything strenuous today. You may shower, but no tub baths or hot tubs today. You may resume your normal activities tomorrow, but do not “overdo it”. Resume normal activities slowly when you are feeling better.  If you experience redness, drainage or swelling at the injection site, or if you develop a fever above 100 degrees, please call [facility] at [phone] or go to the Emergency Room.  Continue to take all routine medicines prescribed by your primary care physician unless otherwise instructed by our staff. Most blood thinners should be started again according to your regularly scheduled dosing. If you have any questions, please give our office a call.    As no general anesthesia was used in today's procedure, you should not experience any side effects related to anesthesia.       If you have a problem specifically related to your procedure, please call our office at [phone].  Problems not related to your procedure should be directed to your primary care physician.

## 2024-09-23 ENCOUNTER — TELEPHONE (OUTPATIENT)
Dept: PAIN MEDICINE | Facility: CLINIC | Age: 64
End: 2024-09-23

## 2024-10-01 ENCOUNTER — OFFICE VISIT (OUTPATIENT)
Dept: PAIN MEDICINE | Facility: CLINIC | Age: 64
End: 2024-10-01
Payer: COMMERCIAL

## 2024-10-01 VITALS
TEMPERATURE: 48.2 F | WEIGHT: 164 LBS | HEART RATE: 82 BPM | BODY MASS INDEX: 25.74 KG/M2 | DIASTOLIC BLOOD PRESSURE: 80 MMHG | HEIGHT: 67 IN | SYSTOLIC BLOOD PRESSURE: 124 MMHG

## 2024-10-01 DIAGNOSIS — M47.816 LUMBAR SPONDYLOSIS: ICD-10-CM

## 2024-10-01 DIAGNOSIS — M54.16 LUMBAR RADICULOPATHY: Primary | ICD-10-CM

## 2024-10-01 DIAGNOSIS — M51.362 DEGENERATION OF INTERVERTEBRAL DISC OF LUMBAR REGION WITH DISCOGENIC BACK PAIN AND LOWER EXTREMITY PAIN: ICD-10-CM

## 2024-10-01 DIAGNOSIS — G57.01 PIRIFORMIS SYNDROME OF RIGHT SIDE: ICD-10-CM

## 2024-10-01 PROCEDURE — 99214 OFFICE O/P EST MOD 30 MIN: CPT | Performed by: PHYSICIAN ASSISTANT

## 2024-10-01 NOTE — PROGRESS NOTES
Assessment:  1. Lumbar radiculopathy    2. Degeneration of intervertebral disc of lumbar region with discogenic back pain and lower extremity pain    3. Lumbar spondylosis    4. Piriformis syndrome of right side        Plan:  While the patient was in the office today, I did have a thorough conversation regarding their chronic pain syndrome, medication management, and treatment plan options.    After discussing options, I have recommended that we hold off on any further interventional treatment at this time.  Unfortunately she is unable to report any significant or lasting relief from the recent right piriformis injection.  We did discuss the possibility of repeating a transforaminal epidural steroid injection in the future.  I would also encouraged her to consider going for an EMG study.    In the meantime I have recommended chiropractic therapy and have provided the patient with a referral.    She will follow-up with us on a as needed basis.    The patient was advised to contact the office should their symptoms worsen in the interim. The patient was agreeable and verbalized an understanding.        History of Present Illness:    The patient is a 64 y.o. female last seen on 9/9/2024 who presents for a follow up office visit  The patient currently reports right-sided buttock pain and posterior thigh pain that she rates a 6 out of 10.  Patient describes the pain as a constant dull, aching, sharp and shooting type of pain.  There is occasional numbness that radiates into the heel.  Patient recently underwent a right piriformis injection on 9/9/2024 and unfortunately she is unable to note any significant improvement.  Prior to that she underwent an L5-S1 interlaminar epidural steroid injection which did not provide any significant relief either.  She has participated in physical therapy but feels she has reached a plateau in treatment.    I have personally reviewed and/or updated the patient's past medical history, past  surgical history, family history, social history, current medications, allergies, and vital signs today.       Review of Systems:    Review of Systems   Respiratory:  Negative for shortness of breath.    Cardiovascular:  Negative for chest pain.   Gastrointestinal:  Negative for constipation, diarrhea, nausea and vomiting.   Musculoskeletal:  Positive for gait problem. Negative for arthralgias, joint swelling and myalgias.   Skin:  Negative for rash.   Neurological:  Negative for dizziness, seizures and weakness.   All other systems reviewed and are negative.        Past Medical History:   Diagnosis Date    No known problems        Past Surgical History:   Procedure Laterality Date    HYSTERECTOMY      age 43    OOPHORECTOMY Left     age 43       Family History   Problem Relation Age of Onset    Diabetes Mother         Mellitus    Lymphoma Mother 80    No Known Problems Father     No Known Problems Sister     No Known Problems Daughter     Diabetes Maternal Grandmother         Mellitus    No Known Problems Maternal Grandfather     No Known Problems Paternal Grandmother     No Known Problems Paternal Grandfather     No Known Problems Maternal Aunt     No Known Problems Maternal Aunt     No Known Problems Brother     No Known Problems Son        Social History     Occupational History    Not on file   Tobacco Use    Smoking status: Never    Smokeless tobacco: Never   Vaping Use    Vaping status: Never Used   Substance and Sexual Activity    Alcohol use: Yes     Comment: Social    Drug use: No    Sexual activity: Not on file         Current Outpatient Medications:     fluticasone (FLONASE) 50 mcg/act nasal spray, 2 sprays into each nostril daily, Disp: 16 mL, Rfl: 0    erythromycin (ILOTYCIN) ophthalmic ointment, Administer 0.5 inches to both eyes daily at bedtime (Patient not taking: Reported on 10/1/2024), Disp: 3.5 g, Rfl: 0    nystatin (MYCOSTATIN) powder, Apply topically 2 (two) times a day (Patient not taking:  "Reported on 10/1/2024), Disp: 30 g, Rfl: 2    triamcinolone (KENALOG) 0.1 % cream, Apply topically 2 (two) times a day Apply to affected area under the breasts (Patient not taking: Reported on 10/1/2024), Disp: 15 g, Rfl: 2    Allergies   Allergen Reactions    Pollen Extract     Sulfa Antibiotics GI Intolerance       Physical Exam:    /80 (BP Location: Left arm, Patient Position: Sitting, Cuff Size: Standard)   Pulse 82   Temp (!) 48.2 °F (9 °C)   Ht 5' 7\" (1.702 m)   Wt 74.4 kg (164 lb)   BMI 25.69 kg/m²     Constitutional:normal, well developed, well nourished, alert, in no distress and non-toxic and no overt pain behavior.  Eyes:anicteric  HEENT:grossly intact  Neck:supple, symmetric, trachea midline and no masses   Pulmonary:even and unlabored  Cardiovascular:No edema or pitting edema present  Skin:Normal without rashes or lesions and well hydrated  Psychiatric:Mood and affect appropriate  Neurologic:Cranial Nerves II-XII grossly intact  Musculoskeletal: Tender right piriformis      Imaging  No orders to display         Orders Placed This Encounter   Procedures    Ambulatory referral to Chiropractic       "

## 2024-11-06 ENCOUNTER — OFFICE VISIT (OUTPATIENT)
Dept: FAMILY MEDICINE CLINIC | Facility: CLINIC | Age: 64
End: 2024-11-06
Payer: COMMERCIAL

## 2024-11-06 VITALS
WEIGHT: 164.25 LBS | HEIGHT: 67 IN | DIASTOLIC BLOOD PRESSURE: 68 MMHG | SYSTOLIC BLOOD PRESSURE: 110 MMHG | HEART RATE: 104 BPM | BODY MASS INDEX: 25.78 KG/M2 | TEMPERATURE: 97.4 F | OXYGEN SATURATION: 95 %

## 2024-11-06 DIAGNOSIS — Z12.4 ENCOUNTER FOR PAPANICOLAOU SMEAR FOR CERVICAL CANCER SCREENING: ICD-10-CM

## 2024-11-06 DIAGNOSIS — R10.2 VULVOVAGINAL PAIN: Primary | ICD-10-CM

## 2024-11-06 DIAGNOSIS — R20.2 PARESTHESIA OF SKIN: ICD-10-CM

## 2024-11-06 PROCEDURE — 99214 OFFICE O/P EST MOD 30 MIN: CPT | Performed by: NURSE PRACTITIONER

## 2024-11-06 NOTE — PROGRESS NOTES
Ambulatory Visit  Name: Reba Mirza      : 1960      MRN: [id number]  Encounter Provider: LA Amor  Encounter Date: 2024   Encounter department: [facility]    Assessment & Plan  Vulvovaginal pain  Suspect neuropathy from spine but will need to monitor for rash as we discussed shingles  No visible skin abnormalities on exam today  Orders:    Thinprep Tis Pap Reflex HPV mRNA E6/E7    Paresthesia of skin  Suspect neuropathy from spine but will need to monitor for rash as we discussed shingles  No visible skin abnormalities on exam today       Encounter for Papanicolaou smear for cervical cancer screening  Pap collected last done , repeat 5 years  This will be the last pap if normal  Orders:    Thinprep Tis Pap Reflex HPV mRNA E6/E7       History of Present Illness     Here today for  abnormal sensation in groin/labia region started last Wednesday on right side only  No lesions or sores  Skin feels sensitive, mildly itchy  No fevers, no body aches  No abnormal discharge, no post menopausal bleeding  Has been seeing pain management for right lumbar radiculopathy-Right buttock pain and posterior thigh pain numbness  Has received injections without relief. Referred to chiropractor and sees them twice weekly    Vaginal Pain  The patient's primary symptoms include pelvic pain. The patient's pertinent negatives include no genital itching, genital lesions, genital odor, genital rash, vaginal bleeding or vaginal discharge. This is a new problem. The current episode started in the past 7 days. The problem occurs constantly. The problem has been unchanged. The problem affects the right side. Associated symptoms include back pain. Pertinent negatives include no constipation, diarrhea, discolored urine, dysuria, flank pain, frequency, hematuria, nausea, painful intercourse, rash, urgency or vomiting. Nothing aggravates the symptoms. No, her partner does not have an STD. She is  "postmenopausal.         Review of Systems   Gastrointestinal:  Negative for constipation, diarrhea, nausea and vomiting.   Genitourinary:  Positive for pelvic pain and vaginal pain. Negative for decreased urine volume, difficulty urinating, dyspareunia, dysuria, flank pain, frequency, genital sores, hematuria, menstrual problem, urgency, vaginal bleeding and vaginal discharge.   Musculoskeletal:  Positive for back pain.   Skin:  Negative for rash.   Neurological:  Positive for numbness.           Objective     /68 (BP Location: Left arm, Patient Position: Sitting, Cuff Size: Adult)   Pulse 104   Temp (!) 97.4 °F (36.3 °C) (Tympanic)   Ht 5' 7\" (1.702 m)   Wt 74.5 kg (164 lb 4 oz)   SpO2 95%   BMI 25.73 kg/m²     Physical Exam  Vitals reviewed.   Constitutional:       General: She is not in acute distress.     Appearance: Normal appearance. She is normal weight.   HENT:      Head: Normocephalic.   Eyes:      Conjunctiva/sclera: Conjunctivae normal.      Pupils: Pupils are equal, round, and reactive to light.   Abdominal:      Palpations: Abdomen is soft.      Tenderness: There is no abdominal tenderness.   Genitourinary:     Exam position: Supine.      Pubic Area: No rash.       Labia:         Right: Tenderness (sensitive) present. No rash, lesion or injury.         Left: No rash or tenderness.       Vagina: Normal. No vaginal discharge or tenderness.      Cervix: Normal. No discharge, erythema or cervical bleeding.      Uterus: Normal.       Adnexa:         Right: No tenderness.          Left: No tenderness.        Rectum: Normal.   Neurological:      Mental Status: She is alert.         "

## 2024-11-12 ENCOUNTER — TELEPHONE (OUTPATIENT)
Dept: FAMILY MEDICINE CLINIC | Facility: CLINIC | Age: 64
End: 2024-11-12

## 2024-11-12 LAB
CLINICAL INFO: NORMAL
CYTO CVX: NORMAL
CYTOLOGY CMNT CVX/VAG CYTO-IMP: NORMAL
DATE PREVIOUS BX: NORMAL
LMP START DATE: NORMAL
SL AMB PREV. PAP:: NORMAL
SPECIMEN SOURCE CVX/VAG CYTO: NORMAL

## 2024-11-12 NOTE — TELEPHONE ENCOUNTER
Gave patient following message. Patient does not have any questions.  LA Amor  11/12/2024  4:10 PM EST       Lee Britton, Your pap was normal. No more pap smears needed.     Thank you!!

## 2024-11-12 NOTE — TELEPHONE ENCOUNTER
----- Message from LA Lu sent at 11/12/2024  4:10 PM EST -----  Lee Britton, Your pap was normal. No more pap smears needed.

## 2024-11-13 ENCOUNTER — TELEPHONE (OUTPATIENT)
Dept: PAIN MEDICINE | Facility: CLINIC | Age: 64
End: 2024-11-13

## 2024-11-13 NOTE — TELEPHONE ENCOUNTER
----- Message from Cherry Mcrae PA-C sent at 11/13/2024 11:10 AM EST -----  Can clinical call patient and obtain more information on the reason for her upcoming appointment this Friday?  I see she has a recent diagnosis of vulvovaginal pain and the reason on her appointment line for Friday states pain between her legs.  I just want to make the patient is aware that this is not something that [facility] can treat.  Also I have reviewed her lumbar spine MRI again which only shows very mild degenerative changes and nothing that would explain the pelvic/vaginal pain.    Thank you.

## 2024-11-13 NOTE — TELEPHONE ENCOUNTER
S/w pt, stated that she has completed 7 visits to chiro as of Tuesday. Noticed at the end of october, numbness in her R lower buttock and between her leg and labia on the R side. Pt denies any loss of bowel or bladder. Per pt, she was advised to fu with [facility] per Chiro. Advised pt of MG's comments above. Will relay this info to MG to confirm. Can MG suggest who the pt should fu with? Gyn? And the writer will cb to advise. Pt verbalized understanding and appreciation.

## 2024-11-13 NOTE — TELEPHONE ENCOUNTER
I would recommend a gyn consult and even possibly a consult for pelvic floor therapy ([facility] in [place] has a good program).

## 2024-11-14 ENCOUNTER — TELEPHONE (OUTPATIENT)
Age: 64
End: 2024-11-14

## 2024-11-14 NOTE — TELEPHONE ENCOUNTER
Patient called for advise.  She did go back to her pain and spine provider who report they did not believe the numbness was steaming from her spinal issue, and that they would not be able to help her as it was not the spine. They recommended seeing an OBGYN or Pelvic floor Therapist, for further advise for the numbness in her posterior buttocks region. She would like to speak with her provider today for guidance. Please call patient as soon as possible to discuss

## 2024-11-15 DIAGNOSIS — R10.2 VULVOVAGINAL PAIN: Primary | ICD-10-CM

## 2024-11-15 DIAGNOSIS — M79.18 MYOFASCIAL PAIN SYNDROME: ICD-10-CM

## 2024-11-21 NOTE — TELEPHONE ENCOUNTER
Pt called to see if Brina would enter a referral to see a gynecologist who could rule out some diagnoses for her current situation of the pelvic numbness. Pt was recommended to see Dr. Saritha Alvarez in [place].    Please advise if able to enter referral and notify pt once completed, TY.

## 2024-12-06 DIAGNOSIS — J30.9 ALLERGIC RHINITIS, UNSPECIFIED: ICD-10-CM

## 2024-12-06 RX ORDER — FLUTICASONE PROPIONATE 50 MCG
2 SPRAY, SUSPENSION (ML) NASAL DAILY
Qty: 16 ML | Refills: 2 | Status: SHIPPED | OUTPATIENT
Start: 2024-12-06

## 2024-12-06 NOTE — TELEPHONE ENCOUNTER
Medication:     fluticasone (FLONASE) 50 mcg/act nasal spray       Dose/Frequency:  2 sprays into each nostril daily,     Quantity: 16 mL    Pharmacy:  [facility]/pharmacy #[id number] - [place] - [address]     Office:   [x] PCP/Provider -   [] Speciality/Provider -     Does the patient have enough for 3 days?   [] Yes   [x] No - Send as HP to POD

## 2024-12-17 ENCOUNTER — OFFICE VISIT (OUTPATIENT)
Dept: PAIN MEDICINE | Facility: CLINIC | Age: 64
End: 2024-12-17
Payer: COMMERCIAL

## 2024-12-17 VITALS
HEIGHT: 67 IN | SYSTOLIC BLOOD PRESSURE: 132 MMHG | TEMPERATURE: 98.2 F | DIASTOLIC BLOOD PRESSURE: 80 MMHG | HEART RATE: 118 BPM | WEIGHT: 165 LBS | BODY MASS INDEX: 25.9 KG/M2

## 2024-12-17 DIAGNOSIS — R20.0 RIGHT LEG NUMBNESS: ICD-10-CM

## 2024-12-17 DIAGNOSIS — M54.16 LUMBAR RADICULOPATHY: Primary | ICD-10-CM

## 2024-12-17 DIAGNOSIS — R29.898 RIGHT LEG WEAKNESS: ICD-10-CM

## 2024-12-17 PROCEDURE — 99214 OFFICE O/P EST MOD 30 MIN: CPT | Performed by: PHYSICIAN ASSISTANT

## 2024-12-17 NOTE — PROGRESS NOTES
Assessment:  1. Lumbar radiculopathy    2. Right leg numbness    3. Right leg weakness        Plan:  While the patient was in the office today, I did have a thorough conversation regarding their chronic pain syndrome, medication management, and treatment plan options.    After discussing options, I have recommended that the patient proceed with an EMG of the right lower extremity for further evaluation of the numbness and weakness.    I have also recommended an neurology consultation and have placed that referral on today's visit.  I would like to get their input on if additional studies are warranted such as MRI of brain, cervical spine/thoracic spine.    In the meantime, proceed with an L5-S1 epidural steroid injection to address the radicular component of her pain pattern.    Consider low-dose gabapentin.    She may take over-the-counter NSAIDs as needed.    The patient was advised to contact the office should their symptoms worsen in the interim. The patient was agreeable and verbalized an understanding.        History of Present Illness:    The patient is a 64 y.o. female last seen on 10/01/24 who presents for a follow up office visit in regards to right lower extremity pain and paresthesias.. The patient currently reports consistent pain in the right buttock, posterior thigh and posterior calf.  She rates her current pain a 9 out of 10 and describes it as a constant burning, dull, aching, sharp and shooting type of pain.  She states that she now has numbness that has extended into the plantar aspect of the right foot which is fairly constant.  She reports subjective weakness and near falls.  She also has developed pain and numbness in the right perineal region.  She is attending pelvic floor therapy in hopes that this will improve some of those symptoms.  A piriformis injection done previously did not help.  She states that she did feel that the epidural steroid injection provided some benefit.  Lumbar spine  MRI reveals stable mild noncompressive degenerative changes.  There is mild foraminal narrowing at the L5-S1 level.  She has previously attended physical therapy and chiropractic care with no improvement.  She is taking Tylenol without relief.    I have personally reviewed and/or updated the patient's past medical history, past surgical history, family history, social history, current medications, allergies, and vital signs today.       Review of Systems:    Review of Systems   Musculoskeletal:  Positive for gait problem.        Decrease ROM, muscle weakness.         Past Medical History:   Diagnosis Date   • No known problems        Past Surgical History:   Procedure Laterality Date   • HYSTERECTOMY      age 43   • OOPHORECTOMY Left     age 43       Family History   Problem Relation Age of Onset   • Diabetes Mother         Mellitus   • Lymphoma Mother 80   • No Known Problems Father    • No Known Problems Sister    • No Known Problems Daughter    • Diabetes Maternal Grandmother         Mellitus   • No Known Problems Maternal Grandfather    • No Known Problems Paternal Grandmother    • No Known Problems Paternal Grandfather    • No Known Problems Maternal Aunt    • No Known Problems Maternal Aunt    • No Known Problems Brother    • No Known Problems Son        Social History     Occupational History   • Not on file   Tobacco Use   • Smoking status: Never   • Smokeless tobacco: Never   Vaping Use   • Vaping status: Never Used   Substance and Sexual Activity   • Alcohol use: Yes     Comment: Social   • Drug use: No   • Sexual activity: Not on file         Current Outpatient Medications:   •  fluticasone (FLONASE) 50 mcg/act nasal spray, 2 sprays into each nostril daily, Disp: 16 mL, Rfl: 2  •  erythromycin (ILOTYCIN) ophthalmic ointment, Administer 0.5 inches to both eyes daily at bedtime (Patient not taking: Reported on 10/1/2024), Disp: 3.5 g, Rfl: 0  •  nystatin (MYCOSTATIN) powder, Apply topically 2 (two) times a  "day (Patient not taking: Reported on 10/1/2024), Disp: 30 g, Rfl: 2  •  triamcinolone (KENALOG) 0.1 % cream, Apply topically 2 (two) times a day Apply to affected area under the breasts (Patient not taking: Reported on 10/1/2024), Disp: 15 g, Rfl: 2    Allergies   Allergen Reactions   • Pollen Extract    • Sulfa Antibiotics GI Intolerance       Physical Exam:    /80 (BP Location: Left arm, Patient Position: Sitting, Cuff Size: Standard)   Pulse (!) 118   Temp 98.2 °F (36.8 °C)   Ht 5' 7\" (1.702 m)   Wt 74.8 kg (165 lb)   BMI 25.84 kg/m²     Constitutional:normal, well developed, well nourished, alert, in no distress and non-toxic and no overt pain behavior.  Eyes:anicteric  HEENT:grossly intact  Neck:supple, symmetric, trachea midline and no masses   Pulmonary:even and unlabored  Cardiovascular:No edema or pitting edema present  Skin:Normal without rashes or lesions and well hydrated  Psychiatric:Mood and affect appropriate  Neurologic:Cranial Nerves II-XII grossly intact  Musculoskeletal: Full range of motion of the lumbar spine, lumbar spine is nontender, there is some right piriformis tenderness, straight leg raise test is positive on the right side in seated position, strength is slightly decreased in the right lower extremity.  Patient is unable to do heel toe walk with the right foot.      Imaging  FL spine and pain procedure    (Results Pending)         Orders Placed This Encounter   Procedures   • FL spine and pain procedure   • Ambulatory Referral to Neurology   • EMG 1 Limb       "

## 2024-12-27 ENCOUNTER — HOSPITAL ENCOUNTER (OUTPATIENT)
Dept: RADIOLOGY | Facility: CLINIC | Age: 64
Discharge: HOME | End: 2024-12-27
Payer: COMMERCIAL

## 2024-12-27 VITALS
SYSTOLIC BLOOD PRESSURE: 126 MMHG | DIASTOLIC BLOOD PRESSURE: 78 MMHG | TEMPERATURE: 97.5 F | HEART RATE: 85 BPM | OXYGEN SATURATION: 97 % | RESPIRATION RATE: 18 BRPM

## 2024-12-27 DIAGNOSIS — M54.16 LUMBAR RADICULOPATHY: ICD-10-CM

## 2024-12-27 PROCEDURE — 62323 NJX INTERLAMINAR LMBR/SAC: CPT | Performed by: ANESTHESIOLOGY

## 2024-12-27 RX ORDER — METHYLPREDNISOLONE ACETATE 80 MG/ML
80 INJECTION, SUSPENSION INTRA-ARTICULAR; INTRALESIONAL; INTRAMUSCULAR; PARENTERAL; SOFT TISSUE ONCE
Status: COMPLETED | OUTPATIENT
Start: 2024-12-27 | End: 2024-12-27

## 2024-12-27 RX ADMIN — METHYLPREDNISOLONE ACETATE 80 MG: 80 INJECTION, SUSPENSION INTRA-ARTICULAR; INTRALESIONAL; INTRAMUSCULAR; SOFT TISSUE at 10:50

## 2024-12-27 RX ADMIN — IOHEXOL 1 ML: 300 INJECTION, SOLUTION INTRAVENOUS at 10:50

## 2024-12-27 NOTE — H&P
History of Present Illness: The patient is a 64 y.o. female who presents with complaints of low back and leg pain.    Past Medical History:   Diagnosis Date    No known problems        Past Surgical History:   Procedure Laterality Date    HYSTERECTOMY      age 43    OOPHORECTOMY Left     age 43         Current Outpatient Medications:     erythromycin (ILOTYCIN) ophthalmic ointment, Administer 0.5 inches to both eyes daily at bedtime (Patient not taking: Reported on 10/1/2024), Disp: 3.5 g, Rfl: 0    fluticasone (FLONASE) 50 mcg/act nasal spray, 2 sprays into each nostril daily, Disp: 16 mL, Rfl: 2    nystatin (MYCOSTATIN) powder, Apply topically 2 (two) times a day (Patient not taking: Reported on 10/1/2024), Disp: 30 g, Rfl: 2    triamcinolone (KENALOG) 0.1 % cream, Apply topically 2 (two) times a day Apply to affected area under the breasts (Patient not taking: Reported on 10/1/2024), Disp: 15 g, Rfl: 2    Current Facility-Administered Medications:     iohexol (OMNIPAQUE) 300 mg/mL injection 1 mL, 1 mL, Epidural, Once, Juan Padron DO    methylPREDNISolone acetate (DEPO-MEDROL) injection 80 mg, 80 mg, Epidural, Once, Juan Padron DO    Allergies   Allergen Reactions    Pollen Extract     Sulfa Antibiotics GI Intolerance       Physical Exam:   General: Awake, Alert, Oriented x 3, Mood and affect appropriate  Respiratory: Respirations even and unlabored  Cardiovascular: Peripheral pulses intact; no edema  Musculoskeletal Exam: Decreased range of motion lumbar spine    ASA Score: 2         Assessment:   1. Lumbar radiculopathy        Plan: L5-S1 LESI

## 2024-12-27 NOTE — DISCHARGE INSTRUCTIONS
Epidural Steroid Injection   WHAT YOU NEED TO KNOW:   An epidural steroid injection (LIANNA) is a procedure to inject steroid medicine into the epidural space. The epidural space is between your spinal cord and vertebrae. Steroids reduce inflammation and fluid buildup in your spine that may be causing pain. You may be given pain medicine along with the steroids.          ACTIVITY  Do not drive or operate machinery today.  No strenuous activity today - bending, lifting, etc.  You may resume normal activites starting tomorrow - start slowly and as tolerated.  You may shower today, but no tub baths or hot tubs.  You may have numbness for several hours from the local anesthetic. Please use caution and common sense, especially with weight-bearing activities.    CARE OF THE INJECTION SITE  If you have soreness or pain, apply ice to the area today (20 minutes on/20 minutes off).  Starting tomorrow, you may use warm, moist heat or ice if needed.  You may have an increase or change in your discomfort for 36-48 hours after your treatment.  Apply ice and continue with any pain medication you have been prescribed.  Notify the [facility] if you have any of the following: redness, drainage, swelling, headache, stiff neck or fever above 100°F.    SPECIAL INSTRUCTIONS  Our office will contact you in approximately 14 days for a progress report.    MEDICATIONS  Continue to take all routine medications.  Our office may have instructed you to hold some medications.    As no general anesthesia was used in today's procedure, you should not experience any side effects related to anesthesia.     If you are diabetic, the steroids used in today's injection may temporarily increase your blood sugar levels after the first few days after your injection. Please keep a close eye on your sugars and alert the doctor who manages your diabetes if your sugars are significantly high from your baseline or you are symptomatic.     If you have a  problem specifically related to your procedure, please call our office at [phone].  Problems not related to your procedure should be directed to your primary care physician.

## 2025-01-02 ENCOUNTER — TELEPHONE (OUTPATIENT)
Age: 65
End: 2025-01-02

## 2025-01-02 NOTE — TELEPHONE ENCOUNTER
Pt called in to request a hearing test, also requested for yearly lab work pt stateted she went to lab and they stated they ... I didn't see any recent active or  lab orders please follow up

## 2025-01-03 DIAGNOSIS — Z13.29 SCREENING FOR THYROID DISORDER: Primary | ICD-10-CM

## 2025-01-03 DIAGNOSIS — H91.90 DECREASED HEARING, UNSPECIFIED LATERALITY: Primary | ICD-10-CM

## 2025-01-03 DIAGNOSIS — Z13.220 SCREENING CHOLESTEROL LEVEL: ICD-10-CM

## 2025-01-03 DIAGNOSIS — Z13.0 SCREENING FOR DEFICIENCY ANEMIA: ICD-10-CM

## 2025-01-03 DIAGNOSIS — Z13.1 SCREENING FOR DIABETES MELLITUS: ICD-10-CM

## 2025-01-06 ENCOUNTER — HOSPITAL ENCOUNTER (OUTPATIENT)
Dept: NEUROLOGY | Facility: CLINIC | Age: 65
Discharge: HOME | End: 2025-01-06
Payer: COMMERCIAL

## 2025-01-06 DIAGNOSIS — M54.16 LUMBAR RADICULOPATHY: ICD-10-CM

## 2025-01-06 PROBLEM — G57.41 RIGHT TIBIAL NEUROPATHY: Status: ACTIVE | Noted: 2025-01-06

## 2025-01-06 PROCEDURE — 95886 MUSC TEST DONE W/N TEST COMP: CPT | Performed by: PSYCHIATRY & NEUROLOGY

## 2025-01-06 PROCEDURE — 95909 NRV CNDJ TST 5-6 STUDIES: CPT | Performed by: PSYCHIATRY & NEUROLOGY

## 2025-01-07 ENCOUNTER — TELEPHONE (OUTPATIENT)
Age: 65
End: 2025-01-07

## 2025-01-07 DIAGNOSIS — G57.41 RIGHT TIBIAL NEUROPATHY: Primary | ICD-10-CM

## 2025-01-07 RX ORDER — GABAPENTIN 100 MG/1
CAPSULE ORAL
Qty: 81 CAPSULE | Refills: 0 | Status: SHIPPED | OUTPATIENT
Start: 2025-01-07 | End: 2025-02-06

## 2025-01-07 NOTE — TELEPHONE ENCOUNTER
Patient called stating she is having issues sleeping due to sciatic pain.  She would like to know if PCP can prescribe something to help sleep.  Patient would also like to know next step after EMG.  Please advise and contact patient.

## 2025-01-07 NOTE — TELEPHONE ENCOUNTER
Caller: Pt    Doctor: Cherry Mcrae    Reason for call: Pt states EMG results are in and would like to know results and what the next steps are.    Please assist.     Call back#: [phone]

## 2025-01-08 ENCOUNTER — RESULTS FOLLOW-UP (OUTPATIENT)
Dept: PAIN MEDICINE | Facility: CLINIC | Age: 65
End: 2025-01-08

## 2025-01-08 ENCOUNTER — TELEPHONE (OUTPATIENT)
Age: 65
End: 2025-01-08

## 2025-01-08 NOTE — TELEPHONE ENCOUNTER
Lee Griffin,    Please let patient know I cannot order any studies until I see her in clinic. She has to be an established patient with neurology for me to order any studies. I saw her for EMG and I made some recommendations, but I cannot order until her clinic appt. I did explain this to her when I saw her for her EMG.     Thanks,  HARVEY

## 2025-01-08 NOTE — TELEPHONE ENCOUNTER
S/w pt, advised of above. Pt questioned imaging that was discussed during her emg. Advised pt per MG, this is an MRI that neurology would have to order as our office does not order this specific imaging. Pt with numerous questions re: the condition and the progression. Advised pt, this condition is something that would not be referred to spine and pain - but would be referred to a neurologist. Unfortunately, the writer cannot provide any additional information but would suggest contacting neurology and requesting to be put on a cancellation list to be seen sooner. Pt verbalized understanding and appreciation.

## 2025-01-08 NOTE — TELEPHONE ENCOUNTER
----- Message from Cherry Mcrae PA-C sent at 1/8/2025  7:10 AM EST -----  Please let patient know that her EMG shows severe sciatic neuropathy.  Patient should continue with the current plan of neurology consult.

## 2025-01-08 NOTE — TELEPHONE ENCOUNTER
Pt calling to advise her Spine & Pain provider mentioned to her about ordering other testing. Pt stated Spine & Pain is able to order these testing. Pt is asking if Dr Ga would order prior to her appt so the results will be there to discuss.      Per notes from Pain Medicine:    I would like to get their input on if additional studies are warranted such as MRI of brain, cervical spine/thoracic spine.       Pt was informed that she has not established care yet with Neurology, provider may not be able to order additional testing until she is seen. Pt is asking for a call back regarding this.      Please call to advise.

## 2025-01-09 NOTE — TELEPHONE ENCOUNTER
Pt called to make gabapentin was sent to pharmacy, she will call pharmacy to make sure it is there. Informed pt to call us back if any issues

## 2025-01-10 NOTE — TELEPHONE ENCOUNTER
Brina,  Please let her know I am talking with radiology to find out the best way to image the nerve. If I found out before her appt with me, we can let her know and she can ask PCP if they are able to order. Gabapentin is a good choice for nerve pain and should not interfere with any testing.   Thank you,  Deana Ga MD

## 2025-01-13 NOTE — TELEPHONE ENCOUNTER
01/13/25    Patient called office today to check if she can reschedule a sooner appt then 02/18/25 with Dr. Ga.    I MADE NO PROMISES, but I checked for patient.    Unfortunately, no sooner appt available with any of the Neuromuscular Attending Per Advice from [facility] to schedule patient with.    Informed Patient, and Patient decided to stay with her 02/18/25 appt and stay on the waiting list.      PER [facility]:  Results - Continue Scheduling   Modify Visit: CONSULT PG   Provider/Subgroup   Schedule with subgroup NEUROLOGY NEUROMUSCULAR ATTENDINGS      Schedule Instructions    Schedule ASAP: Within 1-3 weeks       IN ADDITION:  Related Previous Encounter Message In-Regards of Meds and EMG.  Patient UNDERSTOOD and AGREED.       Any questions, please contact Patient.  Thank You.

## 2025-01-24 ENCOUNTER — APPOINTMENT (OUTPATIENT)
Dept: LAB | Facility: HOSPITAL | Age: 65
End: 2025-01-24
Payer: COMMERCIAL

## 2025-01-24 DIAGNOSIS — Z13.0 SCREENING FOR DEFICIENCY ANEMIA: ICD-10-CM

## 2025-01-24 DIAGNOSIS — Z13.220 SCREENING CHOLESTEROL LEVEL: ICD-10-CM

## 2025-01-24 DIAGNOSIS — Z13.1 SCREENING FOR DIABETES MELLITUS: ICD-10-CM

## 2025-01-24 DIAGNOSIS — Z13.29 SCREENING FOR THYROID DISORDER: ICD-10-CM

## 2025-01-24 LAB
ALBUMIN SERPL BCG-MCNC: 4.3 G/DL (ref 3.5–5)
ALP SERPL-CCNC: 87 U/L (ref 34–104)
ALT SERPL W P-5'-P-CCNC: 15 U/L (ref 7–52)
ANION GAP SERPL CALCULATED.3IONS-SCNC: 9 MMOL/L (ref 4–13)
AST SERPL W P-5'-P-CCNC: 13 U/L (ref 13–39)
BASOPHILS # BLD AUTO: 0.02 THOUSANDS/ΜL (ref 0–0.1)
BASOPHILS NFR BLD AUTO: 0 % (ref 0–1)
BILIRUB SERPL-MCNC: 0.89 MG/DL (ref 0.2–1)
BUN SERPL-MCNC: 17 MG/DL (ref 5–25)
CALCIUM SERPL-MCNC: 9.5 MG/DL (ref 8.4–10.2)
CHLORIDE SERPL-SCNC: 104 MMOL/L (ref 96–108)
CHOLEST SERPL-MCNC: 167 MG/DL (ref ?–200)
CO2 SERPL-SCNC: 31 MMOL/L (ref 21–32)
CREAT SERPL-MCNC: 0.59 MG/DL (ref 0.6–1.3)
EOSINOPHIL # BLD AUTO: 0.08 THOUSAND/ΜL (ref 0–0.61)
EOSINOPHIL NFR BLD AUTO: 2 % (ref 0–6)
ERYTHROCYTE [DISTWIDTH] IN BLOOD BY AUTOMATED COUNT: 11.9 % (ref 11.6–15.1)
GFR SERPL CREATININE-BSD FRML MDRD: 95 ML/MIN/1.73SQ M
GLUCOSE P FAST SERPL-MCNC: 101 MG/DL (ref 65–99)
HCT VFR BLD AUTO: 46.7 % (ref 34.8–46.1)
HDLC SERPL-MCNC: 45 MG/DL
HGB BLD-MCNC: 14.9 G/DL (ref 11.5–15.4)
IMM GRANULOCYTES # BLD AUTO: 0.02 THOUSAND/UL (ref 0–0.2)
IMM GRANULOCYTES NFR BLD AUTO: 0 % (ref 0–2)
LDLC SERPL CALC-MCNC: 104 MG/DL (ref 0–100)
LYMPHOCYTES # BLD AUTO: 0.85 THOUSANDS/ΜL (ref 0.6–4.47)
LYMPHOCYTES NFR BLD AUTO: 17 % (ref 14–44)
MCH RBC QN AUTO: 29.9 PG (ref 26.8–34.3)
MCHC RBC AUTO-ENTMCNC: 31.9 G/DL (ref 31.4–37.4)
MCV RBC AUTO: 94 FL (ref 82–98)
MONOCYTES # BLD AUTO: 0.33 THOUSAND/ΜL (ref 0.17–1.22)
MONOCYTES NFR BLD AUTO: 7 % (ref 4–12)
NEUTROPHILS # BLD AUTO: 3.63 THOUSANDS/ΜL (ref 1.85–7.62)
NEUTS SEG NFR BLD AUTO: 74 % (ref 43–75)
NONHDLC SERPL-MCNC: 122 MG/DL
NRBC BLD AUTO-RTO: 0 /100 WBCS
PLATELET # BLD AUTO: 204 THOUSANDS/UL (ref 149–390)
PMV BLD AUTO: 11 FL (ref 8.9–12.7)
POTASSIUM SERPL-SCNC: 4.2 MMOL/L (ref 3.5–5.3)
PROT SERPL-MCNC: 7.1 G/DL (ref 6.4–8.4)
RBC # BLD AUTO: 4.98 MILLION/UL (ref 3.81–5.12)
SODIUM SERPL-SCNC: 144 MMOL/L (ref 135–147)
TRIGL SERPL-MCNC: 90 MG/DL (ref ?–150)
TSH SERPL DL<=0.05 MIU/L-ACNC: 2.5 UIU/ML (ref 0.45–4.5)
WBC # BLD AUTO: 4.93 THOUSAND/UL (ref 4.31–10.16)

## 2025-01-24 PROCEDURE — 85025 COMPLETE CBC W/AUTO DIFF WBC: CPT

## 2025-01-24 PROCEDURE — 36415 COLL VENOUS BLD VENIPUNCTURE: CPT

## 2025-01-24 PROCEDURE — 80061 LIPID PANEL: CPT

## 2025-01-24 PROCEDURE — 84443 ASSAY THYROID STIM HORMONE: CPT

## 2025-01-24 PROCEDURE — 80053 COMPREHEN METABOLIC PANEL: CPT

## 2025-01-27 ENCOUNTER — OFFICE VISIT (OUTPATIENT)
Dept: AUDIOLOGY | Age: 65
End: 2025-01-27
Payer: COMMERCIAL

## 2025-01-27 ENCOUNTER — CONSULT (OUTPATIENT)
Dept: NEUROLOGY | Facility: CLINIC | Age: 65
End: 2025-01-27
Payer: COMMERCIAL

## 2025-01-27 VITALS
BODY MASS INDEX: 25.12 KG/M2 | HEART RATE: 112 BPM | SYSTOLIC BLOOD PRESSURE: 134 MMHG | OXYGEN SATURATION: 97 % | TEMPERATURE: 97.8 F | WEIGHT: 160.4 LBS | DIASTOLIC BLOOD PRESSURE: 74 MMHG

## 2025-01-27 DIAGNOSIS — M54.16 LUMBAR RADICULOPATHY: ICD-10-CM

## 2025-01-27 DIAGNOSIS — R29.898 RIGHT LEG WEAKNESS: ICD-10-CM

## 2025-01-27 DIAGNOSIS — R20.0 RIGHT LEG NUMBNESS: ICD-10-CM

## 2025-01-27 DIAGNOSIS — H91.90 DECREASED HEARING, UNSPECIFIED LATERALITY: ICD-10-CM

## 2025-01-27 DIAGNOSIS — H90.3 SENSORY HEARING LOSS, BILATERAL: Primary | ICD-10-CM

## 2025-01-27 DIAGNOSIS — M54.41 ACUTE RIGHT-SIDED LOW BACK PAIN WITH RIGHT-SIDED SCIATICA: ICD-10-CM

## 2025-01-27 DIAGNOSIS — R20.0 NUMBNESS: ICD-10-CM

## 2025-01-27 DIAGNOSIS — G57.01 MONONEUROPATHY OF RIGHT SCIATIC NERVE: Primary | ICD-10-CM

## 2025-01-27 PROCEDURE — 92567 TYMPANOMETRY: CPT | Performed by: AUDIOLOGIST-HEARING AID FITTER

## 2025-01-27 PROCEDURE — 99205 OFFICE O/P NEW HI 60 MIN: CPT | Performed by: PSYCHIATRY & NEUROLOGY

## 2025-01-27 PROCEDURE — 92557 COMPREHENSIVE HEARING TEST: CPT | Performed by: AUDIOLOGIST-HEARING AID FITTER

## 2025-01-27 RX ORDER — GABAPENTIN 300 MG/1
300 CAPSULE ORAL 3 TIMES DAILY
Qty: 90 CAPSULE | Refills: 3 | Status: SHIPPED | OUTPATIENT
Start: 2025-01-27

## 2025-01-27 NOTE — ASSESSMENT & PLAN NOTE
Orders:    Ambulatory Referral to Neurology    MRI lumbosacral plexus wo and w contrast; Future    gabapentin (Neurontin) 300 mg capsule; Take 1 capsule (300 mg total) by mouth 3 (three) times a day

## 2025-01-27 NOTE — PROGRESS NOTES
Diagnostic Hearing Evaluation    Name:  Reba Mirza  :  1960  Age:  65 y.o.   MRN:  [id number]  Date of Evaluation: 25     HISTORY:     Reason for visit: Tinnitus    Reba Mirza is being seen today at the request of Dr. Ramirez for an initial  evaluation of hearing. The patient reports she is experiencing bilateral tinnitus. This is not bothersome or new. No complaints of hearing loss. There is a history of loud noise exposure.     EVALUATION:    Otoscopic Evaluation:   Right Ear: Unremarkable, canal clear   Left Ear: Unremarkable, canal clear    Tympanometry:   Right Ear: Type A; normal middle ear pressure and static compliance    Left Ear: Type A; normal middle ear pressure and static compliance     Speech Audiometry:  Speech Reception (SRT)    Right Ear: 25 dB HL    Left Ear: 25 dB HL    Word Recognition Scores (WRS):  Right Ear: excellent (100 % correct)     Left Ear: excellent (100 % correct)    Stimuli: [id number]    Pure Tone Audiometry:  Conventional pure tone audiometry from 250 - 8000 Hz  was obtained with good reliability and revealed the following:     Right Ear: Mild sensorineural hearing loss (SNHL)    Left Ear: Normal hearing sensitivity    *see attached audiogram    RECOMMENDATIONS:  Annual hearing eval and Return to [facility]. for F/U    PATIENT EDUCATION:   The results of today's results and recommendations were reviewed with the patient and her hearing thresholds were explained at length. Questions were addressed and the patient was encouraged to contact our department should concerns arise.      Sunny Yeboah, CCC-A  Clinical Audiologist  [facility]  [address] [place] [address]  [place] [phone]

## 2025-01-27 NOTE — ASSESSMENT & PLAN NOTE
Her current level of pain control is not satisfactory for her so I told her to increase her dose of gabapentin to twice or 3 times daily.  We discussed the potential side effects including sedation.

## 2025-01-27 NOTE — ASSESSMENT & PLAN NOTE
Ms Mirza has an unusual case.  She has progressive diminished sensation with discomfort of the right leg.  She has prominent weakness of the right foot plantar flexors and inverters with involvement of the knee flexors.  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  This is an unusual localizations because sciatic mononeuropathy usually causes disproportionate involvement of the peroneal division.  Her lumbar MRI did not show an abnormality that might account for her symptoms.  However, her perianal and vulvar involvement implicates other nerve roots as well.  MR neurography may be required but is not available at this institution.  I am going to start with an MRI of the lumbosacral plexus to see if it provides important diagnostic clues.  She will contact me after her testing is completed to discuss the results.  If further testing is required then for the sake of expediency it might be best to send her to a peripheral nerve surgeon at [facility], especially given the possibility of an intrinsic neural lesion.  I explained all of the above to  and Mr. Mirza and answered all of their questions.  They are in agreement with this plan.  I spent 80 minutes on this visit.    Orders:    MRI lumbosacral plexus wo and w contrast; Future    gabapentin (Neurontin) 300 mg capsule; Take 1 capsule (300 mg total) by mouth 3 (three) times a day

## 2025-01-27 NOTE — PROGRESS NOTES
Name: Reba Mirza      : 1960      MRN: [id number]  Encounter Provider: Immanuel Aviles MD  Encounter Date: 2025   Encounter department: [facility]  :  Assessment & Plan  Lumbar radiculopathy    Orders:    Ambulatory Referral to Neurology    MRI lumbosacral plexus wo and w contrast; Future    gabapentin (Neurontin) 300 mg capsule; Take 1 capsule (300 mg total) by mouth 3 (three) times a day    Right leg numbness    Orders:    Ambulatory Referral to Neurology    Right leg weakness    Orders:    Ambulatory Referral to Neurology    Mononeuropathy of right sciatic nerve  Ms Mirza has an unusual case.  She has progressive diminished sensation with discomfort of the right leg.  She has prominent weakness of the right foot plantar flexors and inverters with involvement of the knee flexors.  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  This is an unusual localizations because sciatic mononeuropathy usually causes disproportionate involvement of the peroneal division.  Her lumbar MRI did not show an abnormality that might account for her symptoms.  However, her perianal and vulvar involvement implicates other nerve roots as well.  MR neurography may be required but is not available at this institution.  I am going to start with an MRI of the lumbosacral plexus to see if it provides important diagnostic clues.  She will contact me after her testing is completed to discuss the results.  If further testing is required then for the sake of expediency it might be best to send her to a peripheral nerve surgeon at [facility], especially given the possibility of an intrinsic neural lesion.  I explained all of the above to  and Mr. Mirza and answered all of their questions.  They are in agreement with this plan.  I spent 80 minutes on this visit.    Orders:    MRI lumbosacral plexus wo and w contrast; Future    gabapentin (Neurontin) 300  "mg capsule; Take 1 capsule (300 mg total) by mouth 3 (three) times a day    Acute right-sided low back pain with right-sided sciatica         Numbness  Her current level of pain control is not satisfactory for her so I told her to increase her dose of gabapentin to twice or 3 times daily.  We discussed the potential side effects including sedation.               History of Present Illness   HPI  I had the pleasure of seeing Reba Mirza, a 65 y.o. female, for neuromuscular consultation. The referral was for numbness.    Approximately 1 year ago she developed what she perceived to be \"regular sciatic pain\".  She described discomfort in the right hamstring soon accompanied by numbness \"like Novocain\" in the region of the posterior right thigh.  She had a lumbar MRI which I personally reviewed which did not show a cause of her symptoms.  She had an adnexal lesion leading to ultrasound which suggested a simple cyst.  Her symptoms did not improve with physical therapy or chiropractic management.  An epidural steroid injection and a shot in the region of the piriformis muscle were not helpful.  In October her numbness spread into the buttock and into the right side of her vulva.  She has diminished right vulvar sensation, but also discomfort \"like a bruise\" so when she wipes after urination she needs to dab instead of wipe.  There was minimal improvement with the administration of gabapentin, which was increased to a dose of 300 mg at night.  It is uncomfortable for her to sit on her buttock on either side, but otherwise her symptoms are confined to the right leg.  She has aching in the calf and her right foot feels \"twice its size\".  The right leg seems weak and it is difficult for her to get on her tiptoes.  It is difficult for her to climb stairs but she has no difficulty stepping up onto the curb.  She has no history of incontinence.  She had an EMG of the right leg earlier this month with Dr. Ga.  Sensory " conduction studies were normal but she had a diminished right tibial CMAP amplitude.  She had irritable features with only a single recruitable motor units in the right medial gastrocnemius, tibialis posterior, and semimembranosus.  The tibialis anterior, EHL, tensor fascia jose, gluteus medius, and short head of the biceps femoris were spared.      Review of Systems   Constitutional:  Negative for appetite change, fatigue and fever.   HENT: Negative.  Negative for hearing loss, tinnitus, trouble swallowing and voice change.    Eyes: Negative.  Negative for photophobia, pain and visual disturbance.   Respiratory: Negative.  Negative for shortness of breath.    Cardiovascular: Negative.  Negative for palpitations.   Gastrointestinal: Negative.  Negative for nausea and vomiting.   Endocrine: Negative.  Negative for cold intolerance.   Genitourinary: Negative.  Negative for dysuria, frequency and urgency.   Musculoskeletal:  Positive for gait problem (has to be more careful balance wise). Negative for back pain, myalgias, neck pain and neck stiffness.   Skin: Negative.  Negative for rash.   Allergic/Immunologic: Negative.    Neurological:  Positive for numbness (R foot, also patch of numbness back of R thigh, alsopain from hip to foot). Negative for dizziness, tremors, seizures, syncope, facial asymmetry, speech difficulty, weakness, light-headedness and headaches.   Hematological: Negative.  Does not bruise/bleed easily.   Psychiatric/Behavioral: Negative.  Negative for confusion, hallucinations and sleep disturbance.     I have personally reviewed the MA's review of systems and made changes as necessary.    Medical History Reviewed by provider this encounter:     .  Past Medical History   Past Medical History:   Diagnosis Date    No known problems      Past Surgical History:   Procedure Laterality Date    HYSTERECTOMY      age 43    OOPHORECTOMY Left     age 43     Family History   Problem Relation Age of Onset     Diabetes Mother         Mellitus    Lymphoma Mother 80    No Known Problems Father     No Known Problems Sister     No Known Problems Daughter     Diabetes Maternal Grandmother         Mellitus    No Known Problems Maternal Grandfather     No Known Problems Paternal Grandmother     No Known Problems Paternal Grandfather     No Known Problems Maternal Aunt     No Known Problems Maternal Aunt     No Known Problems Brother     No Known Problems Son       reports that she has never smoked. She has never used smokeless tobacco. She reports current alcohol use. She reports that she does not use drugs.  Current Outpatient Medications on File Prior to Visit   Medication Sig Dispense Refill    [DISCONTINUED] gabapentin (NEURONTIN) 100 mg capsule Take 1 capsule (100 mg total) by mouth daily at bedtime for 3 days, THEN 2 capsules (200 mg total) daily at bedtime for 3 days, THEN 3 capsules (300 mg total) daily at bedtime for 24 days. 81 capsule 0    erythromycin (ILOTYCIN) ophthalmic ointment Administer 0.5 inches to both eyes daily at bedtime (Patient not taking: Reported on 10/1/2024) 3.5 g 0    fluticasone (FLONASE) 50 mcg/act nasal spray 2 sprays into each nostril daily (Patient not taking: Reported on 1/27/2025) 16 mL 2    nystatin (MYCOSTATIN) powder Apply topically 2 (two) times a day (Patient not taking: Reported on 10/1/2024) 30 g 2    triamcinolone (KENALOG) 0.1 % cream Apply topically 2 (two) times a day Apply to affected area under the breasts (Patient not taking: Reported on 10/1/2024) 15 g 2     No current facility-administered medications on file prior to visit.     Allergies   Allergen Reactions    Pollen Extract     Sulfa Antibiotics GI Intolerance      Current Outpatient Medications on File Prior to Visit   Medication Sig Dispense Refill    [DISCONTINUED] gabapentin (NEURONTIN) 100 mg capsule Take 1 capsule (100 mg total) by mouth daily at bedtime for 3 days, THEN 2 capsules (200 mg total) daily at bedtime for  3 days, THEN 3 capsules (300 mg total) daily at bedtime for 24 days. 81 capsule 0    erythromycin (ILOTYCIN) ophthalmic ointment Administer 0.5 inches to both eyes daily at bedtime (Patient not taking: Reported on 10/1/2024) 3.5 g 0    fluticasone (FLONASE) 50 mcg/act nasal spray 2 sprays into each nostril daily (Patient not taking: Reported on 1/27/2025) 16 mL 2    nystatin (MYCOSTATIN) powder Apply topically 2 (two) times a day (Patient not taking: Reported on 10/1/2024) 30 g 2    triamcinolone (KENALOG) 0.1 % cream Apply topically 2 (two) times a day Apply to affected area under the breasts (Patient not taking: Reported on 10/1/2024) 15 g 2     No current facility-administered medications on file prior to visit.      Social History     Tobacco Use    Smoking status: Never    Smokeless tobacco: Never   Vaping Use    Vaping status: Never Used   Substance and Sexual Activity    Alcohol use: Yes     Comment: Social    Drug use: No    Sexual activity: Not on file     Appointment on 01/24/2025   Component Date Value Ref Range Status    TSH 3RD GENERATON 01/24/2025 2.498  0.450 - 4.500 uIU/mL Final    The recommended reference ranges for TSH during pregnancy are as follows:   First trimester 0.100 to 2.500 uIU/mL   Second trimester  0.200 to 3.000 uIU/mL   Third trimester 0.300 to 3.000 uIU/m    Note: Normal ranges may not apply to patients who are transgender, non-binary, or whose legal sex, sex at birth, and gender identity differ.  Adult TSH (3rd generation) reference range follows the recommended guidelines of the American Thyroid Association, January, 2020.    Cholesterol 01/24/2025 167  See Comment mg/dL Final    Cholesterol:         Pediatric <18 Years        Desirable          <170 mg/dL      Borderline High    170-199 mg/dL      High               >=200 mg/dL        Adult >=18 Years            Desirable         <200 mg/dL      Borderline High   200-239 mg/dL      High              >239 mg/dL      Triglycerides  01/24/2025 90  See Comment mg/dL Final    Triglyceride:     0-9Y            <75mg/dL     10Y-17Y         <90 mg/dL       >=18Y     Normal          <150 mg/dL     Borderline High 150-199 mg/dL     High            200-499 mg/dL        Very High       >499 mg/dL    Specimen collection should occur prior to Metamizole administration due to the potential for falsely depressed results.    HDL, Direct 01/24/2025 45 (L)  >=50 mg/dL Final    LDL Calculated 01/24/2025 104 (H)  0 - 100 mg/dL Final    LDL Cholesterol:     Optimal           <100 mg/dl     Near Optimal      100-129 mg/dl     Above Optimal       Borderline High 130-159 mg/dl       High            160-189 mg/dl       Very High       >189 mg/dl         This screening LDL is a calculated result.   It does not have the accuracy of the Direct Measured LDL in the monitoring of patients with hyperlipidemia and/or statin therapy.   Direct Measure LDL (ZYK038) must be ordered separately in these patients.    Non-HDL-Chol (CHOL-HDL) 01/24/2025 122  mg/dl Final    Sodium 01/24/2025 144  135 - 147 mmol/L Final    Potassium 01/24/2025 4.2  3.5 - 5.3 mmol/L Final    Chloride 01/24/2025 104  96 - 108 mmol/L Final    CO2 01/24/2025 31  21 - 32 mmol/L Final    ANION GAP 01/24/2025 9  4 - 13 mmol/L Final    BUN 01/24/2025 17  5 - 25 mg/dL Final    Creatinine 01/24/2025 0.59 (L)  0.60 - 1.30 mg/dL Final    Standardized to IDMS reference method    Glucose, Fasting 01/24/2025 101 (H)  65 - 99 mg/dL Final    Calcium 01/24/2025 9.5  8.4 - 10.2 mg/dL Final    AST 01/24/2025 13  13 - 39 U/L Final    ALT 01/24/2025 15  7 - 52 U/L Final    Specimen collection should occur prior to Sulfasalazine administration due to the potential for falsely depressed results.     Alkaline Phosphatase 01/24/2025 87  34 - 104 U/L Final    Total Protein 01/24/2025 7.1  6.4 - 8.4 g/dL Final    Albumin 01/24/2025 4.3  3.5 - 5.0 g/dL Final    Total Bilirubin 01/24/2025 0.89  0.20 - 1.00 mg/dL Final    Use of  this assay is not recommended for patients undergoing treatment with eltrombopag due to the potential for falsely elevated results.  N-acetyl-p-benzoquinone imine (metabolite of Acetaminophen) will generate erroneously low results in samples for patients that have taken an overdose of Acetaminophen.    eGFR 01/24/2025 95  ml/min/1.73sq m Final    WBC 01/24/2025 4.93  4.31 - 10.16 Thousand/uL Final    RBC 01/24/2025 4.98  3.81 - 5.12 Million/uL Final    Hemoglobin 01/24/2025 14.9  11.5 - 15.4 g/dL Final    Hematocrit 01/24/2025 46.7 (H)  34.8 - 46.1 % Final    MCV 01/24/2025 94  82 - 98 fL Final    MCH 01/24/2025 29.9  26.8 - 34.3 pg Final    MCHC 01/24/2025 31.9  31.4 - 37.4 g/dL Final    RDW 01/24/2025 11.9  11.6 - 15.1 % Final    MPV 01/24/2025 11.0  8.9 - 12.7 fL Final    Platelets 01/24/2025 204  149 - 390 Thousands/uL Final    nRBC 01/24/2025 0  /100 WBCs Final    Segmented % 01/24/2025 74  43 - 75 % Final    Immature Grans % 01/24/2025 0  0 - 2 % Final    Lymphocytes % 01/24/2025 17  14 - 44 % Final    Monocytes % 01/24/2025 7  4 - 12 % Final    Eosinophils Relative 01/24/2025 2  0 - 6 % Final    Basophils Relative 01/24/2025 0  0 - 1 % Final    Absolute Neutrophils 01/24/2025 3.63  1.85 - 7.62 Thousands/µL Final    Absolute Immature Grans 01/24/2025 0.02  0.00 - 0.20 Thousand/uL Final    Absolute Lymphocytes 01/24/2025 0.85  0.60 - 4.47 Thousands/µL Final    Absolute Monocytes 01/24/2025 0.33  0.17 - 1.22 Thousand/µL Final    Eosinophils Absolute 01/24/2025 0.08  0.00 - 0.61 Thousand/µL Final    Basophils Absolute 01/24/2025 0.02  0.00 - 0.10 Thousands/µL Final   Office Visit on 11/06/2024   Component Date Value Ref Range Status    SL AMB CLINICAL INFORMATION 11/07/2024 None given   Final    LMP 11/07/2024 NONE GIVEN   Final    Prev. PAP 11/07/2024 NONE GIVEN   Final    Prev. BX 11/07/2024 NONE GIVEN   Final    Source 11/07/2024 None given   Final    Statement of Adequacy 11/07/2024    Final    Comment:  Satisfactory for evaluation.  Endocervical/transformation zone component  present.      Interpretation/Result 11/07/2024 Cytology Results: Negative for intraepithelial lesion or malignancy.   Final    Comment 11/07/2024 This Pap test has been evaluated with computer assisted technology.   Final    SL AMB CYTOTECHNOLOGIST: 11/07/2024    Final    Comment: ROVERTO LAND(ASCP)  CT screening location: [place] [address]        Slide Preparation performed at:  [place] [address]  CLIA No.: [id number]      Comment 11/07/2024    Final    Comment: EXPLANATORY NOTE:      The Pap is a screening test for cervical cancer. It is   not a diagnostic test and is subject to false negative   and false positive results. It is most reliable when a   satisfactory sample, regularly obtained, is submitted   with relevant clinical findings and history, and when   the Pap result is evaluated along with historic and   current clinical information.        Procedure visit on 08/14/2024   Component Date Value Ref Range Status    Case Report 08/14/2024    Final                    Value:Surgical Pathology Report                         Case: [id number]                                  Authorizing Provider:  Ramesh Rojas MD         Collected:           08/14/2024 1222              Ordering Location:     [facility]      Received:            08/14/2024 [address] [place] [address]                                                                       Pathologist:           Meghan Colvin MD                                                           Specimen:    Skin, Other, specimen A;central upper back                                                 Final Diagnosis 08/14/2024    Final                    Value:A. Skin, central upper back, excision:    -Residual BASAL CELL CARCINOMA (NODULAR TYPE); not seen at examined inked specimen margins.    -Prior  "procedure site changes present.           Additional Information 08/14/2024    Final                    Value:All reported additional testing was performed with appropriately reactive controls.  These tests were developed and their performance characteristics determined by [facility] or appropriate performing facility, though some tests may be performed on tissues which have not been validated for performance characteristics (such as staining performed on alcohol exposed cell blocks and decalcified tissues).  Results should be interpreted with caution and in the context of the patients’ clinical condition. These tests may not be cleared or approved by the U.S. Food and Drug Administration, though the FDA has determined that such clearance or approval is not necessary. These tests are used for clinical purposes and they should not be regarded as investigational or for research. This laboratory has been approved by CLIA 88, designated as a high-complexity laboratory and is qualified to perform these tests.  .      Gross Description 08/14/2024    Final                    Value:A. The specimen is received in formalin, labeled with the patient's name and hospital number, and is designated \" central upper back\".  The specimen consists of an ellipse of pale-white skin measuring 4.2 x 1.5 cm excised with depth of 0.6 cm.  The epithelial surface is hairbearing and exhibits a pink, focally keratotic patch measuring 0.7 x 0.7 cm most closely approaching the measures peripheral margin by 0.2 cm.  The skin surface tips are inked red and the margin resection is inked green.  The specimen is serially sectioned revealing a fibromembranous cut surface with multiple areas of hemorrhaging.  Entirely submitted. Seven cassettes.  Between sponges  A1: Tips  A2-7: Central portion of specimen serially and sequentially submitted    Note: The estimated total formalin fixation time based upon information provided by the " submitting clinician and the standard processing schedule is under 72 hours.      [place]        Clinical Information 08/14/2024    Final                    Value:ATTENTION:  DERMPATH GROUP    SPECIMEN A; Skin; Anatomic Location: central upper back; Procedure/Protocol: Skin Specimen (submit in FORMALIN):Excision (into sub-cutaneous fat)   64 y.o. year old  Female with a Morphological Description:pink papule with scattered brown pigment and telangiectasias    Differential Diagnosis and/or Specific Clinical Question:inflamed sk vs pigmented BCC  Any Previous Pathology for Dermatopathologist to Review: [facility] Accession #: [id number]        No results found for this or any previous visit.     No results found for this or any previous visit.    No results found for this or any previous visit.    No results found for this or any previous visit.    No results found for this or any previous visit.    No results found for this or any previous visit.    No results found for this or any previous visit.    Results for orders placed during the hospital encounter of 08/01/24    MRI lumbar spine without contrast    Narrative  MRI LUMBAR SPINE WITHOUT CONTRAST    INDICATION: M54.16: Radiculopathy, lumbar region.    COMPARISON: 10/18/2022    TECHNIQUE:  Multiplanar, multisequence imaging of the lumbar spine was performed. .      IMAGE QUALITY:  Diagnostic    FINDINGS:    VERTEBRAL BODIES:  There are 5 lumbar type vertebral bodies.  Normal alignment of the lumbar spine.  No spondylolysis or spondylolisthesis. No scoliosis.  No compression fracture.    Mild type I endplate marrow degenerative change at the L5-S1 level.    SACRUM:  Normal signal within the sacrum. No evidence of insufficiency or stress fracture.    DISTAL CORD AND CONUS:  Normal size and signal within the distal cord and conus.    PARASPINAL SOFT TISSUES:  Paraspinal soft tissues are unremarkable.    LOWER THORACIC DISC SPACES:  Normal disc height and signal.  No  disc herniation, canal stenosis or foraminal narrowing.    LUMBAR DISC SPACES:    L1-L2:  Normal.    L2-L3:  Normal.    L3-L4: Minimal annular bulging and facet arthropathy without disc herniation, canal stenosis or foraminal narrowing.    L4-L5: Mild annular bulging diffusely. There is a very small broad-based central disc herniation without significant canal stenosis or foraminal narrowing.    L5-S1: Disc desiccation. Mild annular bulging and endplate hypertrophic degenerative change. No significant central canal stenosis. Mild foraminal narrowing.    OTHER FINDINGS: There is a small ovoid cyst within the right adnexa measuring 2.8 cm. This wasn't present on prior examination but appears slightly larger.    Impression  Stable mild noncompressive lumbar degenerative change.    There is an incidental right adnexal cyst cyst according to current guidelines (J Am Wendy Radiol 2013;10:671-681.) In this postmenopausal woman, this should be evaluated by pelvic ultrasound in the short-term. if this lesion is symptomatic, consider  further evaluation with pelvic ultrasound now.    The study was marked in EPIC for significant notification.          Workstation performed: [id number]      Results for orders placed during the hospital encounter of 10/18/22    MRI lumbar spine wo contrast    Narrative  MRI LUMBAR SPINE WITHOUT CONTRAST    INDICATION: M54.41: Lumbago with sciatica, right side  M41.86: Other forms of scoliosis, lumbar region  M51.37: Other intervertebral disc degeneration, lumbosacral region.    COMPARISON:  X-rays dated 9/6/2022    TECHNIQUE:  Sagittal T1, sagittal T2, sagittal inversion recovery, axial T1 and axial T2, coronal T2.  IMAGE QUALITY:  Diagnostic    FINDINGS:    VERTEBRAL BODIES:  There are 5 lumbar type vertebral bodies.  Normal alignment of the lumbar spine.  No spondylolysis or spondylolisthesis. No scoliosis.  No compression fracture.    Normal marrow signal is identified within the visualized  bony  structures.  No discrete marrow lesion.    SACRUM:  Normal signal within the sacrum. No evidence of insufficiency or stress fracture.    DISTAL CORD AND CONUS:  Normal size and signal within the distal cord and conus.    PARASPINAL SOFT TISSUES:  Small T2 hyperintensities are seen within the liver and kidneys most likely representing small cysts.  The liver is enlarged particularly the right lobe which extends inferiorly into the abdomen.    LOWER THORACIC DISC SPACES:  Normal disc height and signal.  No disc herniation, canal stenosis or foraminal narrowing.    LUMBAR DISC SPACES:    L1-L2:  Normal.    L2-L3:  Normal.    L3-L4:  Minimal annular bulging bilaterally with slight loss of disc height.  Mild facet degenerative change.  No disc herniation.  No cauda equina impingement or foraminal narrowing.    L4-L5:  Slight disc desiccation without loss of disc height.  Mild annular bulging with a small central disc protrusion.  No canal stenosis.  No foraminal nerve impingement.    L5-S1:  Disc desiccation and loss of disc height.  Annular bulging with mild endplate hypertrophic change.  Small right foraminal disc protrusion.  Mild foraminal endplate hypertrophic change bilaterally.  There is no canal stenosis or mass effect upon  the thecal sac.  Mild foraminal narrowing bilaterally.    Impression  Lumbar degenerative disc disease L3-4, L4-5 and L5-S1 as described above.  Mild bilateral foraminal narrowing at L5-S1 as a result of disc and endplate changes.      Workstation performed: [id number]    No results found for this or any previous visit.    No results found for this or any previous visit.    No results found for this or any previous visit.    No results found for this or any previous visit.       Objective   /74 (BP Location: Right arm, Patient Position: Sitting, Cuff Size: Adult)   Pulse (!) 112   Temp 97.8 °F (36.6 °C) (Temporal)   Wt 72.8 kg (160 lb 6.4 oz)   SpO2 97%   BMI 25.12 kg/m²      Physical Exam  Neurological Exam  Well developed, well nourished, in no acute distress    Normocephalic, atraumatic    Heart: regular rate and rhythm    Extremities: no clubbing, cyanosis, or edema    Speech and cognition appeared normal    Cranial nerves:  II: Pupils equal, round, and reactive to light. No gross visual field defect. I did not appreciate optic disc edema.  III, IV, VI: Extraocular movements intact  V: Normal facial sensation in all three divisions of the trigeminal nerve bilaterally  VII: Normal facial strength  VIII: Hearing intact to finger rub bilaterally  IX, X: Palate elevated symmetrically  XI: Sternocleidomastoid strength normal bilaterally  XII: Tongue protruded in midline without atrophy or fibrillations    Motor:  Normal tone and bulk throughout. Muscle strength testing by the MRC scale was 5/5 in the deltoid, biceps, triceps, wrist extensors, wrist flexors, finger extensors, finger flexors, hip flexors, quadriceps  5/5 in the left hamstrings, ankle dorsiflexors, ankle plantar flexors, and EHL  2/5 in the right plantar flexors  4/5 in the right foot invertors and knee flexors  5/5 in the right EHL and dorsiflexors    Deep tendon reflexes:   2+ and symmetrical in the biceps, triceps, brachioradialis  1+ in the patellas  Absent in the ankles  Toes downgoing (no Babinski sign)  No Kwon's sign    Sensation: Allodynia in the sole of the right foot, worse medially, without sensory loss  No sensory loss in the legs  Normal perivulvar sensation but diminished perianal sensation (chaperoned)    Cerebellar: normal finger to nose and heel to shin testing    Gait: Unable to stand on the toes on the right

## 2025-01-28 ENCOUNTER — TELEPHONE (OUTPATIENT)
Dept: HEMATOLOGY ONCOLOGY | Facility: CLINIC | Age: 65
End: 2025-01-28

## 2025-01-28 NOTE — TELEPHONE ENCOUNTER
Called and left voicemail for patient due to changing her appointment with Wanda from 8:30am to 10:30am on 2/17. Informed that if the date and time doesn't work to contact the office and we can reschedule.

## 2025-01-28 NOTE — TELEPHONE ENCOUNTER
Called and left voicemail for patient that we will need to reschedule her apt with eleuterio on 2/17. Informed to please call the office back to do so.

## 2025-01-30 ENCOUNTER — RESULTS FOLLOW-UP (OUTPATIENT)
Dept: FAMILY MEDICINE CLINIC | Facility: CLINIC | Age: 65
End: 2025-01-30

## 2025-01-30 NOTE — RESULT ENCOUNTER NOTE
Lee Britton, sorry for my delayed response. Your labs overall are stable. Your cholesterol numbers improved slightly from a year ago. Electrolytes and liver functions are good. You will see your HCT is just off normal this could be from dehydration but the other reamining parts of red blood cells are normal, platelets are normal, thyroid is normal. White blood cell count is normal. Repeat in 1year

## 2025-01-30 NOTE — TELEPHONE ENCOUNTER
----- Message from LA Lu sent at 1/30/2025  4:34 PM EST -----  samira Allan for my delayed response. Your labs overall are stable. Your cholesterol numbers improved slightly from a year ago. Electrolytes and liver functions are good. You will see your HCT is just off normal this could be from dehydration but the other reamining parts of red blood cells are normal, platelets are normal, thyroid is normal. White blood cell count is normal. Repeat in 1year

## 2025-02-05 ENCOUNTER — TELEPHONE (OUTPATIENT)
Age: 65
End: 2025-02-05

## 2025-02-05 NOTE — TELEPHONE ENCOUNTER
Patient called, wanted to let Dr Avlies know, that she was able to get an earlier MRI appointment with [facility] had it done 2/6/24.

## 2025-02-06 NOTE — TELEPHONE ENCOUNTER
Pt is returning provider call, she confirmed a detailed voicemail can be left if she is unable to answer or a detailed [employer] message.

## 2025-02-06 NOTE — TELEPHONE ENCOUNTER
Dr. Aviles please know Patient returned call. MRI was completed today at [facility] and can be seen in imaging tab. Thank you!

## 2025-02-07 ENCOUNTER — TELEPHONE (OUTPATIENT)
Dept: NEUROLOGY | Facility: CLINIC | Age: 65
End: 2025-02-07

## 2025-02-07 ENCOUNTER — TELEPHONE (OUTPATIENT)
Age: 65
End: 2025-02-07

## 2025-02-07 NOTE — TELEPHONE ENCOUNTER
Immanuel Aviles MD to Me     2/6/25  4:53 PM   Discussed  She understands  I will obtain the actual films  I will investigate appropriate subspecialty referral for suspected neural microsurgery    ------------------------------------------------------------  Immanuel Aviles MD to LA Amor       2/7/25  1:03 PM   Discussed at length.  She understands she has a mass lesion that appears to involve the sciatic nerve that is causing her symptoms.  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  She will need a subspecialist with expertise in neural microsurgery.  Nobody in network who does this kind of thing.  Referred to Dr. Anabel Carlton at [facility], neurosurgeon and codirector of the peripheral nerve center there.  ------------------------------------------------------------  LA Amor to Immanuel Aviles MD       2/7/25  1:11 PM   Thank you for the update Dr Aviles.

## 2025-02-07 NOTE — TELEPHONE ENCOUNTER
Email sent to Ana Lilia Timmons at [facility] to download views from MRI lumbar plexus done 2/4 @ [facility] so Dr Aviles can review. Will await response

## 2025-02-07 NOTE — TELEPHONE ENCOUNTER
Pt called stated she was sent to [facility] Neurosurgeon Dr Anabel Carlton.    Dr Anabel Carlton is requesting EMG results to be faxed.      Fax # [phone]    or if that one doesn't work use    Fax # [phone]      Any questions pleas call  # [phone]      Pt stated they will review records, MRI, EMG first then call pt to schedule. Pt has completed the intake already.

## 2025-02-10 NOTE — TELEPHONE ENCOUNTER
MRI and EMG faxed to [facility] Neurosurgery attention Dr Anabel Carlton [phone]. Fax confirmation scanned into chart.

## 2025-02-11 ENCOUNTER — TELEPHONE (OUTPATIENT)
Age: 65
End: 2025-02-11

## 2025-02-11 NOTE — TELEPHONE ENCOUNTER
Called patient to advise that due to a change in [facility]'s schedule on 2/17 patient's appt had to be rescheduled for 3/3 at 1130 am.  New date and time is fine with patient.

## 2025-02-13 NOTE — TELEPHONE ENCOUNTER
It appears images have now jeancarlos down loaded, however Dr Aviles still unable to view films on pacs. 2nd request sent to Ana Lilia Timmons

## 2025-02-17 ENCOUNTER — TELEPHONE (OUTPATIENT)
Age: 65
End: 2025-02-17

## 2025-02-17 NOTE — TELEPHONE ENCOUNTER
Called patient to reschedule 3/3 1130 am appt with Wanda due to a change in provider's schedule.  Rescheduled with patient for 2/28   At 1030 am.

## 2025-02-24 NOTE — PROGRESS NOTES
"Name: Reba Mirza      : 1960      MRN: [id number]  Encounter Provider: LA Corea  Encounter Date: 2025   Encounter department: [facility]AT[facility]  :  Assessment & Plan  Leukopenia, unspecified type  Patient is a very pleasant 65-year-old female with a history of chronic mild leukopenia.  Previous hematologic workup resulted with negative findings.  Most recent CBC-D is normal.    I will discharge her back to the care of her PCP.  I reviewed she may have recurrent mild leukopenia in the future and this is likely idiopathic.  Patient verbalized understanding.  She will follow-up with hematology on an as-needed basis.  Will happily see her in the future should the need arise.           Return if symptoms worsen or fail to improve.    History of Present Illness   Chief Complaint   Patient presents with    Follow-up     Pertinent Medical History     25: Patient presents for follow-up for history of chronic mild leukopenia.  Most recent blood work is normal.  CBC-D shows no abnormalities.  She feels well with the exception of some sciatica and lower back pain.  She will be seeing neurosurgery at [facility].     Review of Systems   Musculoskeletal:  Positive for back pain.   All other systems reviewed and are negative.    Medical History Reviewed by provider this encounter.      Objective   /64 (BP Location: Left arm, Patient Position: Sitting, Cuff Size: Adult)   Pulse (!) 109   Temp 97.6 °F (36.4 °C) (Temporal)   Resp 18   Ht 5' 7\" (1.702 m)   Wt 72.6 kg (160 lb)   SpO2 97%   BMI 25.06 kg/m²     Physical Exam  Constitutional:       General: She is not in acute distress.     Appearance: Normal appearance.   HENT:      Head: Normocephalic and atraumatic.   Eyes:      General: No scleral icterus.        Right eye: No discharge.         Left eye: No discharge.      Conjunctiva/sclera: Conjunctivae normal.   Cardiovascular:      Rate and " Rhythm: Normal rate and regular rhythm.   Pulmonary:      Effort: Pulmonary effort is normal. No respiratory distress.      Breath sounds: Normal breath sounds.   Abdominal:      General: Bowel sounds are normal. There is no distension.      Palpations: Abdomen is soft. There is no mass.      Tenderness: There is no abdominal tenderness.   Musculoskeletal:         General: Normal range of motion.   Lymphadenopathy:      Cervical: No cervical adenopathy.      Upper Body:      Right upper body: No supraclavicular, axillary or pectoral adenopathy.      Left upper body: No supraclavicular, axillary or pectoral adenopathy.   Skin:     General: Skin is warm and dry.   Neurological:      General: No focal deficit present.      Mental Status: She is alert and oriented to person, place, and time.   Psychiatric:         Mood and Affect: Mood normal.         Behavior: Behavior normal.         Labs: I have reviewed the following labs:  Results for orders placed or performed in visit on 01/24/25   TSH, 3rd generation with Free T4 reflex   Result Value Ref Range    TSH 3RD GENERATON 2.498 0.450 - 4.500 uIU/mL   Lipid panel   Result Value Ref Range    Cholesterol 167 See Comment mg/dL    Triglycerides 90 See Comment mg/dL    HDL, Direct 45 (L) >=50 mg/dL    LDL Calculated 104 (H) 0 - 100 mg/dL    Non-HDL-Chol (CHOL-HDL) 122 mg/dl   Comprehensive metabolic panel   Result Value Ref Range    Sodium 144 135 - 147 mmol/L    Potassium 4.2 3.5 - 5.3 mmol/L    Chloride 104 96 - 108 mmol/L    CO2 31 21 - 32 mmol/L    ANION GAP 9 4 - 13 mmol/L    BUN 17 5 - 25 mg/dL    Creatinine 0.59 (L) 0.60 - 1.30 mg/dL    Glucose, Fasting 101 (H) 65 - 99 mg/dL    Calcium 9.5 8.4 - 10.2 mg/dL    AST 13 13 - 39 U/L    ALT 15 7 - 52 U/L    Alkaline Phosphatase 87 34 - 104 U/L    Total Protein 7.1 6.4 - 8.4 g/dL    Albumin 4.3 3.5 - 5.0 g/dL    Total Bilirubin 0.89 0.20 - 1.00 mg/dL    eGFR 95 ml/min/1.73sq m   CBC and differential   Result Value Ref Range     WBC 4.93 4.31 - 10.16 Thousand/uL    RBC 4.98 3.81 - 5.12 Million/uL    Hemoglobin 14.9 11.5 - 15.4 g/dL    Hematocrit 46.7 (H) 34.8 - 46.1 %    MCV 94 82 - 98 fL    MCH 29.9 26.8 - 34.3 pg    MCHC 31.9 31.4 - 37.4 g/dL    RDW 11.9 11.6 - 15.1 %    MPV 11.0 8.9 - 12.7 fL    Platelets 204 149 - 390 Thousands/uL    nRBC 0 /100 WBCs    Segmented % 74 43 - 75 %    Immature Grans % 0 0 - 2 %    Lymphocytes % 17 14 - 44 %    Monocytes % 7 4 - 12 %    Eosinophils Relative 2 0 - 6 %    Basophils Relative 0 0 - 1 %    Absolute Neutrophils 3.63 1.85 - 7.62 Thousands/µL    Absolute Immature Grans 0.02 0.00 - 0.20 Thousand/uL    Absolute Lymphocytes 0.85 0.60 - 4.47 Thousands/µL    Absolute Monocytes 0.33 0.17 - 1.22 Thousand/µL    Eosinophils Absolute 0.08 0.00 - 0.61 Thousand/µL    Basophils Absolute 0.02 0.00 - 0.10 Thousands/µL           Administrative Statements   I have spent a total time of 20 minutes in caring for this patient on the day of the visit/encounter including Instructions for management, Patient and family education, Documenting in the medical record, Reviewing/placing orders in the medical record (including tests, medications, and/or procedures), and Obtaining or reviewing history  .

## 2025-02-26 DIAGNOSIS — G57.01 MONONEUROPATHY OF RIGHT SCIATIC NERVE: ICD-10-CM

## 2025-02-26 DIAGNOSIS — M54.16 LUMBAR RADICULOPATHY: ICD-10-CM

## 2025-02-27 RX ORDER — GABAPENTIN 300 MG/1
300 CAPSULE ORAL 3 TIMES DAILY
Qty: 90 CAPSULE | Refills: 0 | Status: SHIPPED | OUTPATIENT
Start: 2025-02-27

## 2025-02-28 ENCOUNTER — OFFICE VISIT (OUTPATIENT)
Age: 65
End: 2025-02-28
Payer: COMMERCIAL

## 2025-02-28 VITALS
BODY MASS INDEX: 25.11 KG/M2 | OXYGEN SATURATION: 97 % | HEIGHT: 67 IN | SYSTOLIC BLOOD PRESSURE: 118 MMHG | TEMPERATURE: 97.6 F | WEIGHT: 160 LBS | RESPIRATION RATE: 18 BRPM | DIASTOLIC BLOOD PRESSURE: 64 MMHG | HEART RATE: 109 BPM

## 2025-02-28 DIAGNOSIS — D72.819 LEUKOPENIA, UNSPECIFIED TYPE: Primary | ICD-10-CM

## 2025-02-28 PROCEDURE — 99213 OFFICE O/P EST LOW 20 MIN: CPT | Performed by: NURSE PRACTITIONER

## 2025-03-17 DIAGNOSIS — M54.16 LUMBAR RADICULOPATHY: ICD-10-CM

## 2025-03-17 DIAGNOSIS — G57.01 MONONEUROPATHY OF RIGHT SCIATIC NERVE: ICD-10-CM

## 2025-03-17 RX ORDER — GABAPENTIN 300 MG/1
600 CAPSULE ORAL 3 TIMES DAILY
Qty: 90 CAPSULE | Refills: 0 | Status: SHIPPED | OUTPATIENT
Start: 2025-03-17

## 2025-03-25 ENCOUNTER — TELEPHONE (OUTPATIENT)
Age: 65
End: 2025-03-25

## 2025-03-25 NOTE — TELEPHONE ENCOUNTER
Patient called and is requesting to see if there is any other medication besides, gabapentin which she is currently on, that Dr Aviles can prescribe to help her with her sciatica pain?  Patient says that the gabapentin is not really touching the pain as much, she is still uncomfortable.    She is scheduled to see neurosurgery in 2 weeks.

## 2025-03-31 DIAGNOSIS — G57.01 MONONEUROPATHY OF RIGHT SCIATIC NERVE: ICD-10-CM

## 2025-03-31 DIAGNOSIS — M54.16 LUMBAR RADICULOPATHY: ICD-10-CM

## 2025-03-31 RX ORDER — GABAPENTIN 300 MG/1
900 CAPSULE ORAL 3 TIMES DAILY
Qty: 90 CAPSULE | Refills: 0 | Status: SHIPPED | OUTPATIENT
Start: 2025-03-31 | End: 2025-04-11 | Stop reason: SDUPTHER

## 2025-03-31 NOTE — TELEPHONE ENCOUNTER
Per Dr Aviles: He advised there are a couple of medicines that could be tried. They all have potential side effects. Therefore, I usually recommend that we gradually increase the dose until either 1) it causes side effects or 2) we decided that there is no way that medicine is going to help. Therefore, I think the best decision in your case is to continue to gradually go up on the dose of gabapentin.    __________________________________    I spoke to patient and she understood and requesting provider increase her Gabapentin dose as she is currently taking Gabapentin 600 mg TID, and it is not showing any improvement in her pain.    Pt requesting provider to send a new script to her [facility] in [place].      She currently only has 2 days left of meds. Pt does not need a call back, as Pharmacy will notify her once med is ready for

## 2025-04-03 ENCOUNTER — TELEPHONE (OUTPATIENT)
Age: 65
End: 2025-04-03

## 2025-04-03 NOTE — TELEPHONE ENCOUNTER
Pt stopped in the office to sign an HALEY to get MRI disc    Faxed HALEY to medical records at [phone]

## 2025-04-03 NOTE — TELEPHONE ENCOUNTER
Caller: Reba     Doctor: Dr. Padron    Reason for call: Patient called and needs to complete HALEY to request imaging patient advise will pass by office today to complete HALEY. Please have form ready for patient to complete. Thank you     Call back#: [phone]

## 2025-04-07 ENCOUNTER — TELEPHONE (OUTPATIENT)
Age: 65
End: 2025-04-07

## 2025-04-07 NOTE — TELEPHONE ENCOUNTER
Caller: Patient     Doctor: Dr. 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    She will most likely be having surgery in May but in the time being looking to see if something else can be prescribed for pain.      Please advise     Call back#: [phone]

## 2025-04-08 NOTE — TELEPHONE ENCOUNTER
S/w pt, confirmed gabapentin increase is fairly recent. Advised pt, this office would not make comments or adjustments to a medication prescribed by another doctor however, advised pt that in general, gabapentin can take some time to provide improvement. Pt stated that she is not getting any relief and questioned if any practice is likely to prescribe stronger medications such as opioids. Advised pt, gabapentin treats nerve pain. Opioids are a poor choice for chronic and / or nerve pain. Recommended the pt contact Dr. Aviles to discuss the gabapentin and / or alternative medications that may be able to provide some relief. Pt verbalized understanding and appreciation.

## 2025-04-11 DIAGNOSIS — M54.16 LUMBAR RADICULOPATHY: ICD-10-CM

## 2025-04-11 DIAGNOSIS — G57.01 MONONEUROPATHY OF RIGHT SCIATIC NERVE: ICD-10-CM

## 2025-04-11 RX ORDER — GABAPENTIN 300 MG/1
900 CAPSULE ORAL 3 TIMES DAILY
Qty: 270 CAPSULE | Refills: 0 | Status: SHIPPED | OUTPATIENT
Start: 2025-04-11

## 2025-04-11 NOTE — TELEPHONE ENCOUNTER
Reason for call:   [x] Refill   [] Prior Auth  [] Other:     Office: [facility]   [] PCP/Provider -   [x] Specialty/Provider - Neurology/ Immanuel Aviles     Medication: gabapentin     Dose/Frequency: 300 mg/ 3 caps 3 times daily     Quantity: 90 caps     Pharmacy: [facility] in [place]     Local Pharmacy   Does the patient have enough for 3 days?   [] Yes   [x] No - Send as HP to POD    Mail Away Pharmacy   Does the patient have enough for 10 days?   [] Yes   [] No - Send as HP to POD

## 2025-04-16 ENCOUNTER — HOSPITAL ENCOUNTER (OUTPATIENT)
Dept: MAMMOGRAPHY | Facility: IMAGING CENTER | Age: 65
Discharge: HOME | End: 2025-04-16
Attending: NURSE PRACTITIONER
Payer: COMMERCIAL

## 2025-04-16 VITALS — BODY MASS INDEX: 25.11 KG/M2 | WEIGHT: 160 LBS | HEIGHT: 67 IN

## 2025-04-16 DIAGNOSIS — Z12.31 ENCOUNTER FOR SCREENING MAMMOGRAM FOR BREAST CANCER: ICD-10-CM

## 2025-04-16 PROCEDURE — 77063 BREAST TOMOSYNTHESIS BI: CPT

## 2025-04-16 PROCEDURE — 77067 SCR MAMMO BI INCL CAD: CPT

## 2025-04-24 ENCOUNTER — RESULTS FOLLOW-UP (OUTPATIENT)
Dept: FAMILY MEDICINE CLINIC | Facility: CLINIC | Age: 65
End: 2025-04-24

## 2025-04-24 NOTE — TELEPHONE ENCOUNTER
----- Message from LA Lu sent at 4/24/2025  4:39 PM EDT -----  Your mammogram was normal.  Repeat in 1 year is recommended.

## 2025-04-24 NOTE — TELEPHONE ENCOUNTER
Pt returning call; relayed results from PCP message and pt verbalized understanding. Pt also stated she had a CT scan completed this week at [facility] regarding her sciatica and they found a schwannoma in her hip that needs to be removed and she did let them know to share it with office if Brina Ramirez could look over it.

## 2025-04-30 ENCOUNTER — TELEPHONE (OUTPATIENT)
Age: 65
End: 2025-04-30

## 2025-04-30 NOTE — TELEPHONE ENCOUNTER
Received call from Patient's  for Follow Up - Skin Check. Scheduled 6/17/25 4:20 pm Nilsa Vicente. Verified insurance, provided [facility] addr. Patient's  verbalized understanding.

## 2025-06-17 ENCOUNTER — OFFICE VISIT (OUTPATIENT)
Age: 65
End: 2025-06-17
Payer: COMMERCIAL

## 2025-06-17 VITALS — TEMPERATURE: 97 F | WEIGHT: 149 LBS | BODY MASS INDEX: 23.34 KG/M2

## 2025-06-17 DIAGNOSIS — Z85.828 HISTORY OF BASAL CELL CARCINOMA (BCC): Primary | ICD-10-CM

## 2025-06-17 DIAGNOSIS — D18.01 CHERRY ANGIOMA: ICD-10-CM

## 2025-06-17 DIAGNOSIS — D22.70 MULTIPLE BENIGN MELANOCYTIC NEVI OF UPPER EXTREMITY, LOWER EXTREMITY, AND TRUNK: ICD-10-CM

## 2025-06-17 DIAGNOSIS — D22.5 MULTIPLE BENIGN MELANOCYTIC NEVI OF UPPER EXTREMITY, LOWER EXTREMITY, AND TRUNK: ICD-10-CM

## 2025-06-17 DIAGNOSIS — L81.4 LENTIGINES: ICD-10-CM

## 2025-06-17 DIAGNOSIS — L82.1 SEBORRHEIC KERATOSES: ICD-10-CM

## 2025-06-17 DIAGNOSIS — D22.60 MULTIPLE BENIGN MELANOCYTIC NEVI OF UPPER EXTREMITY, LOWER EXTREMITY, AND TRUNK: ICD-10-CM

## 2025-06-17 PROCEDURE — 99213 OFFICE O/P EST LOW 20 MIN: CPT | Performed by: REGISTERED NURSE

## 2025-06-17 RX ORDER — DULOXETIN HYDROCHLORIDE 30 MG/1
30 CAPSULE, DELAYED RELEASE ORAL EVERY MORNING
COMMUNITY

## 2025-06-17 NOTE — PROGRESS NOTES
"[facility] Dermatology Clinic Note     Patient Name: Reba Mirza  Encounter Date: 6/17/25       Have you been cared for by a [facility] Dermatologist in the last 3 years and, if so, which description applies to you? Yes. I have been here within the last 3 years, and my medical history has NOT changed since that time. I am of child-bearing potential.     REVIEW OF SYSTEMS:  Have you recently had or currently have any of the following? No changes in my recent health.   PAST MEDICAL HISTORY:  Have you personally ever had or currently have any of the following?  If \"YES,\" then please provide more detail. No changes in my medical history.   HISTORY OF IMMUNOSUPPRESSION: Do you have a history of any of the following:  Systemic Immunosuppression such as Diabetes, Biologic or Immunotherapy, Chemotherapy, Organ Transplantation, Bone Marrow Transplantation or Prednisone?  No     Answering \"YES\" requires the addition of the dotphrase \"IMMUNOSUPPRESSED\" as the first diagnosis of the patient's visit.   FAMILY HISTORY:  Any \"first degree relatives\" (parent, brother, sister, or child) with the following?    No changes in my family's known health.   PATIENT EXPERIENCE:    Do you want the Dermatologist to perform a COMPLETE skin exam today including a clinical examination under the \"bra and underwear\" areas?  Yes  If necessary, do we have your permission to call and leave a detailed message on your Preferred Phone number that includes your specific medical information?  Yes      Allergies[1] Current Medications[2]        Whom besides the patient is providing clinical information about today's encounter?   NO ADDITIONAL HISTORIAN (patient alone provided history)    Physical Exam and Assessment/Plan by Diagnosis:    HISTORY OF BASAL CELL CARCINOMA  Physical Exam:  Anatomic Location Affected:  central upper back  Morphological Description of scar:  well-healed scar  Suspected Recurrence: No  Pertinent Positives:  Pertinent " Negatives:    Additional History of Present Condition:  History of basal cell carcinoma with no sign of recurrence. Excision done 8/14/24. Case: [id number].    Assessment and Plan:  Based on a thorough discussion of this condition and the management approach to it (including a comprehensive discussion of the known risks, side effects and potential benefits of treatment), the patient (family) agrees to implement the following specific plan:  Apply high protection factor SPF50+ broad-spectrum sunscreens generously to exposed skin if outdoors   Continue annual skin checks.    How can basal cell carcinoma be prevented?  The most important way to prevent BCC is to avoid sunburn. This is especially important in childhood and early life. Fair skinned individuals and those with a personal or family history of BCC should protect their skin from sun exposure daily, year-round and lifelong.  Stay indoors or under the shade in the middle of the day   Wear covering clothing   Apply high protection factor SPF50+ broad-spectrum sunscreens generously to exposed skin if outdoors   Avoid indoor tanning (sun beds, solaria)  Oral nicotinamide (vitamin B3) in a dose of 500 mg twice daily may reduce the number and severity of BCCs.    What is the outlook for basal cell carcinoma?  Most BCCs are cured by treatment. Cure is most likely if treatment is undertaken when the lesion is small.  About 50% of people with BCC develop a second one within 3 years of the first. They are also at increased risk of other skin cancers, especially melanoma. Regular self-skin examinations and long-term annual skin checks by an experienced health professional are recommended.       GIBBS ANGIOMAS  Physical Exam:  Anatomic Location Affected:  Trunk and extremities  Morphological Description:  Scattered cherry red papules  Denies pain, itch, bleeding. No treatments tried. Present for years. Present constantly; no modifying factors which make it worse or  "better.     Assessment and Plan:  Based on a thorough discussion of this condition and the management approach to it (including a comprehensive discussion of the known risks, side effects and potential benefits of treatment), the patient (family) agrees to implement the following specific plan:  Reassure benign        SEBORRHEIC KERATOSIS; NON-INFLAMED  Physical Exam:  Anatomic Location Affected:  Trunk and extremities  Morphological Description:  Waxy, smooth to warty textured, yellow to brownish-grey to dark brown to blackish, discrete, \"stuck-on\" appearing papules.  Present for years. Denies pain, itch, bleeding.      Additional History of Present Condition:  Present constantly; no modifying factors which make it worse or better. No prior treatment.       Assessment and Plan:  Based on a thorough discussion of this condition and the management approach to it (including a comprehensive discussion of the known risks, side effects and potential benefits of treatment), the patient (family) agrees to implement the following specific plan:  Reassure benign  Use sun protection.  Apply SPF 30 or higher at least three times a day.  Wear sun protecting clothing and hats.        SOLAR LENTIGINES   OTHER SKIN CHANGES DUE TO CHRONIC EXPOSURE TO NONIONIZING RADIATION  Physical Exam:  Anatomic Location Affected:  Sun exposed areas of back, chest, arms, legs  Morphological Description:  Multiple scattered brown to tan evenly pigmented macules   Denies pain, itch, bleeding. No treatments tried. Present for months - years. Reports getting newer lesions with sun exposure.      Assessment and Plan:  Based on a thorough discussion of this condition and the management approach to it (including a comprehensive discussion of the known risks, side effects and potential benefits of treatment), the patient (family) agrees to implement the following specific plan:  Reassure benign  Use sun protection.  Apply SPF 30 or higher at least three " "times a day.  Wear sun protecting clothing and hats.         MULTIPLE MELANOCYTIC NEVI (\"Moles\")  Physical Exam:  Anatomic Location Affected: Trunk and extremities  Morphological Description:  Scattered, round to ovoid, symmetrical-appearing, even bordered, skin colored to dark brown macules/papules  Denies pain, itch, bleeding. No treatments tried. Present for years. Present constantly; no modifying factors which make it worse or better. Denies actively changing or growing moles.      Assessment and Plan:  Based on a thorough discussion of this condition and the management approach to it (including a comprehensive discussion of the known risks, side effects and potential benefits of treatment), the patient (family) agrees to implement the following specific plan:  Reassure benign  Monitor for changes  Use sun protection.  Apply SPF 30 or higher at least three times a day.  Wear sun protecting clothing and hats.     Worrisome signs of skin malignancy discussed, questions answered. Regular self-skin check discussed. Advised to call or return to office if patient notices any spots of concern, rapidly growing/changing lesions, bleeding lesions, non-healing lesions. Advised regular SPF use.     Offered skin exams for area underneath the underwear but patient deferred.   Scribe Attestation      I,:  Lili Wall MA am acting as a scribe while in the presence of the attending physician.:       I,:  Hi Ash MD personally performed the services described in this documentation    as scribed in my presence.:                [1]   Allergies  Allergen Reactions    Pollen Extract     Sulfa Antibiotics GI Intolerance   [2]   Current Outpatient Medications:     erythromycin (ILOTYCIN) ophthalmic ointment, Administer 0.5 inches to both eyes daily at bedtime (Patient not taking: Reported on 10/1/2024), Disp: 3.5 g, Rfl: 0    fluticasone (FLONASE) 50 mcg/act nasal spray, 2 sprays into each nostril daily (Patient not taking: " Reported on 1/27/2025), Disp: 16 mL, Rfl: 2    gabapentin (Neurontin) 300 mg capsule, Take 3 capsules (900 mg total) by mouth 3 (three) times a day, Disp: 270 capsule, Rfl: 0    nystatin (MYCOSTATIN) powder, Apply topically 2 (two) times a day (Patient not taking: Reported on 10/1/2024), Disp: 30 g, Rfl: 2    triamcinolone (KENALOG) 0.1 % cream, Apply topically 2 (two) times a day Apply to affected area under the breasts (Patient not taking: Reported on 10/1/2024), Disp: 15 g, Rfl: 2

## 2025-07-16 ENCOUNTER — EVALUATION (OUTPATIENT)
Dept: PHYSICAL THERAPY | Facility: CLINIC | Age: 65
End: 2025-07-16
Payer: MEDICARE

## 2025-07-16 DIAGNOSIS — M62.81 MUSCLE WEAKNESS (GENERALIZED): ICD-10-CM

## 2025-07-16 DIAGNOSIS — D49.2 NERVE SHEATH TUMOR: ICD-10-CM

## 2025-07-16 DIAGNOSIS — M79.604 RIGHT LEG PAIN: ICD-10-CM

## 2025-07-16 DIAGNOSIS — M54.16 LUMBAR RADICULOPATHY: Primary | ICD-10-CM

## 2025-07-16 PROCEDURE — 97110 THERAPEUTIC EXERCISES: CPT | Performed by: PHYSICAL THERAPIST

## 2025-07-16 PROCEDURE — 97162 PT EVAL MOD COMPLEX 30 MIN: CPT | Performed by: PHYSICAL THERAPIST

## 2025-07-16 NOTE — PROGRESS NOTES
PT Evaluation     Today's date: 2025  Patient name: Reba Mirza  : 1960  MRN: [id number]  Referring provider: Anabel Carlton MD  Dx:   Encounter Diagnosis     ICD-10-CM    1. Lumbar radiculopathy  M54.16       2. Muscle weakness (generalized)  M62.81       3. 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 female presenting to outpatient physical therapy at [facility] with complaints of chronic progressive R glute pain and radiculopathy that began with insidious onset approximately one year ago. After further testing/imaging as well as failed conservative treatment, she was diagnosed with nerve sheath tumor.   She presents with decreased range of motion, decreased strength, limited flexibility, poor postural awareness, poor body mechanics, altered gait pattern, poor balance, decreased tolerance to activity and decreased functional mobility due to Lumbar radiculopathy  (primary encounter diagnosis)  Muscle weakness (generalized), Right leg pain, Nerve sheath tumor.  Therapist discussed diagnosis, prognosis, POC, proper responses to exercises, HEP, DF foot brace to decrease foot drop and prevent future falls. Therapist reviewed ambulation using SPC.  She would benefit from skilled PT services in order to address these deficits and reach maximum level of function.  Thank you for the referral!   Understanding of Dx/Px/POC: good     Prognosis: good    Goals  STGs (in 4 weeks):  1. Pt will report having at least a 50% improvement since I.E.    2. Pt will report having at most a 2/10 pain level with functional mobility.   3. Pt will demonstrate improved core strength and R hip and R knee strength MMT to at least 4/5 throughout.     LTGs (in 12 weeks):  1. Pt will report having at most a 75% improvement since I.E.   2. Pt will be independent with HEP.   3. Pt will perform at least 15 STS in 30 seconds to demonstrate improved strength and endurance with functional mobility.   4. Pt will amb for at least 10 minutes with improved quality with increased nereida with LRAD.   5. Pt will amb at least 500 m during 6MWT using LRAD to demonstrate normative age related values.       Plan  Patient would benefit from: skilled physical therapy and PT eval  Planned modality interventions: TENS and unattended electrical stimulation    Planned therapy interventions: joint mobilization, manual therapy, neuromuscular re-education, patient/caregiver education, strengthening, stretching, therapeutic activities, therapeutic exercise, gait training, flexibility, balance, nerve gliding and home exercise program    Frequency: 2x week  Plan of Care beginning date: 7/16/2025  Plan of Care expiration date: 10/8/2025  Treatment plan discussed with: patient    Subjective Evaluation    History of Present Illness  Mechanism of injury: Pt is a 65 year old female who presents with chief complaint of chronic progressive LBP with R glute pain and RLE radiculopathy. She reports that she has tried conservative treatment with PT (which did not improve sxs), chiropractor (which increased sxs), and then spinal injections (which did not help. She reports that she noticed her sxs worsening in December with noticeable numbness in her foot, decreased feeling, and R foot drop. In January, imaging revealed nerve sheath tumor. She is scheduled for biopsy which results will take approximately two weeks leading to possible surgery. Now referred to skilled OP PT.     Quality of life: good    Patient  Goals  Patient goals for therapy: decreased pain, improved balance, increased motion, independence with ADLs/IADLs, increased strength and return to sport/leisure activities    Pain  Current pain ratin  At best pain ratin  At worst pain rating: 10  Pain location: R buttock and then goes.  Quality: discomfort, throbbing, squeezing, pressure, dull ache and tight (numbness, tiredness)  Relieving factors: medications  Aggravating factors: sitting  Progression: worsening    Social Support  Steps to enter house: yes (1 step to get outside without railing)  Lives in: one-story house    Treatments  Previous treatment: injection treatment, physical therapy and chiropractic    Objective      Posture: Lumbar lordosis is decreased in standing.        Lumbar AROM limitations:  (*=  Pain)  Lumbar flexion: NT  Lumbar extension: NT  R side glide:  NT  L side glide:  NT    Dermatomal Testing:  L2 anterior thigh: intact  L3 medial knee: intact  L4 medial maleolus: Absent   L5 1st web space: Absent  S1 lateral/sole foot: Absent  S2 poster calf:  intact    Reflexes: (0= none; 1+ = slight; 2+ = brisk/normal; 3+= very brisk; 4+= clonus)  L3-4 Quadriceps: NT  L5-S1 Achilles: NT    Strength (MMT)  Myotomal Testing:     Right  Left  L2-3 Hip flexion: 3/5  4/5  L3-4 Knee extension: 3-/5  4/5  L5 Great toe exten: 0/5  4/5  L4 Heel walk/DF: 0/5  4/5  S1 toe walk/PF/ever: 0/5  4/5  S2 knee flex:  3-/5  4/5      Hip abduction  3-/5  4-/5  Hip adduction  3-/5  4/5  Hip extension  3-/5  4-/5    Core strength: Upper abs: 3-/5; lower abs: 3-/5    Special Tests:  SLR supine: (R) positive (L) negative   Crossed SLR: (R) negative (L) negative   Prone instability test: NT  Prone hip IR ROM: NT  Aberrant motion/Sonny's: NT  Supine to sit test: NT  Slump test: (R) positive (L) negative   Femoral NTT: NT    Mechanical Asessment: pre-test symtpoms include NA due to possible   Repeated Extension in Standing (ANTONY): NA  Repeated Extension in Lying  (REIL):  NA    Joint mobility & Palpation: decreased PA glides in thoracic and lumbar spine     Flexibility: decreased HS, piriformis, hip flexor flexibility (R worse than L)    Function:  Standing benjy limited  Sitting benjy limited  Sleep heath Yes    Gait assessment: Pt ambs with R foot drop using SPC with increased hip flexion in swing phase in gait due to foot drop.     Stair negotiation: NT         Daily Treatment Diary     Precautions: dx of nerve sheath tumor    FOTO Completed On: NA    POC Expires Reeval for Medicare to be completed  Unit Limit Auth Expiration Date PT/OT/STVisit Limit   10/8/2025 By visit 10 6 12/31/2025 NA    Completed on visit: NA                   Auth Status DATE 7/16        NA Visit # 1         Remaining 9        MANUAL THERAPY                                                               THERAPEUTIC EXERCISE HEP         Sidelying H' ABD           Sidelying H' ADD                    2 way piriformis stretch                    Supine HS pumps                    Prone Femoral N gliding           Prone quad stretch                    SAQ          SLR (flex)                     LAQ c towel roll                     Prone H' Ext                                        NEUROMUSCULAR REEDUCATION           TAC  with mod march (R up, R down, L up, L down) 1x10 1x10        TAC walk outs          TAC c Ball Squeezes Ball: 5 sec x 10 Ball 5 sec x 5        Ball with Bridge                    TAC c TB Hooklying Clamshells          TB Bridges  No TB 5 sec x 10 No TB: 5 sec x 10                  TAC c OH Feet          TAC c heel slides                     TAC Dying Bug                                                   THERAPEUTIC ACTIVITY          Chair Squats                                        GAIT TRAINING          Laps                                        MODALITIES                               Skin checks performed pre and post application: intact

## 2025-07-16 NOTE — LETTER
2025    Anabel Carlton MD  [address] [place] [id number]    Patient: Reba Mirza   YOB: 1960   Date of Visit: 2025     Encounter Diagnosis     ICD-10-CM    1. Lumbar radiculopathy  M54.16       2. Muscle weakness (generalized)  M62.81       3. 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 Anabel Carlton MD:    Thank you for your recent referral of Reba Mirza. Please review the attached evaluation summary from Reba's recent visit.     Please verify that you agree with the plan of care by signing the attached order.     If you have any questions or concerns, please do not hesitate to call.     I sincerely appreciate the opportunity to share in the care of one of your patients and hope to have another opportunity to work with you in the near future.       Sincerely,    Anne Harris, PT      Referring Provider:      I certify that I have read the below Plan of Care and certify the need for these services furnished under this plan of treatment while under my care.                    Anabel Carlton MD  [address] [place] [id number]  Via Fax: [phone]          PT Evaluation     Today's date: 2025  Patient name: Reba Mirza  : 1960  MRN: [id number]  Referring provider: Anabel Carlton MD  Dx:   Encounter Diagnosis     ICD-10-CM    1. Lumbar radiculopathy  M54.16       2. Muscle weakness (generalized)  M62.81       3. 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 female presenting to outpatient physical therapy at [facility] with complaints of chronic progressive R glute pain and radiculopathy that began with insidious onset approximately one year ago. After further testing/imaging as well as failed conservative treatment, she was diagnosed with nerve sheath tumor.   She presents with decreased range of motion, decreased strength, limited flexibility, poor postural awareness, poor body mechanics, altered gait pattern, poor balance, decreased tolerance to activity and decreased functional mobility due to Lumbar radiculopathy  (primary encounter diagnosis)  Muscle weakness (generalized), Right leg pain, Nerve sheath tumor.  Therapist discussed diagnosis, prognosis, POC, proper responses to exercises, HEP, DF foot brace to decrease foot drop and prevent future falls. Therapist reviewed ambulation using SPC.  She would benefit from skilled PT services in order to address these deficits and reach maximum level of function.  Thank you for the referral!   Understanding of Dx/Px/POC: good     Prognosis: good    Goals  STGs (in 4 weeks):  1. Pt will report having at least a 50% improvement since I.E.   2. Pt will report having at most a 2/10 pain level with functional mobility.   3. Pt will demonstrate improved core strength and R hip and R knee strength MMT to at least 4/5 throughout.     LTGs (in 12 weeks):  1. Pt will report having at most a 75% improvement since I.E.   2. Pt will be independent with HEP.   3. Pt will perform at least 15 STS in 30 seconds to demonstrate improved strength and endurance with functional mobility.   4. Pt will amb for at least 10 minutes with improved quality with increased nereida with LRAD.   5. Pt will amb at least 500 m during 6MWT using LRAD to demonstrate normative age related values.       Plan  Patient would benefit from: skilled physical therapy and PT eval  Planned modality interventions: TENS and unattended electrical  stimulation    Planned therapy interventions: joint mobilization, manual therapy, neuromuscular re-education, patient/caregiver education, strengthening, stretching, therapeutic activities, therapeutic exercise, gait training, flexibility, balance, nerve gliding and home exercise program    Frequency: 2x week  Plan of Care beginning date: 2025  Plan of Care expiration date: 10/8/2025  Treatment plan discussed with: patient    Subjective Evaluation    History of Present Illness  Mechanism of injury: Pt is a 65 year old female who presents with chief complaint of chronic progressive LBP with R glute pain and RLE radiculopathy. She reports that she has tried conservative treatment with PT (which did not improve sxs), chiropractor (which increased sxs), and then spinal injections (which did not help. She reports that she noticed her sxs worsening in December with noticeable numbness in her foot, decreased feeling, and R foot drop. In January, imaging revealed nerve sheath tumor. She is scheduled for biopsy which results will take approximately two weeks leading to possible surgery. Now referred to skilled OP PT.     Quality of life: good    Patient Goals  Patient goals for therapy: decreased pain, improved balance, increased motion, independence with ADLs/IADLs, increased strength and return to sport/leisure activities    Pain  Current pain ratin  At best pain ratin  At worst pain rating: 10  Pain location: R buttock and then goes.  Quality: discomfort, throbbing, squeezing, pressure, dull ache and tight (numbness, tiredness)  Relieving factors: medications  Aggravating factors: sitting  Progression: worsening    Social Support  Steps to enter house: yes (1 step to get outside without railing)  Lives in: one-story house    Treatments  Previous treatment: injection treatment, physical therapy and chiropractic    Objective      Posture: Lumbar lordosis is decreased in standing.        Lumbar AROM limitations:   (*=  Pain)  Lumbar flexion: NT  Lumbar extension: NT  R side glide:  NT  L side glide:  NT    Dermatomal Testing:  L2 anterior thigh: intact  L3 medial knee: intact  L4 medial maleolus: Absent   L5 1st web space: Absent  S1 lateral/sole foot: Absent  S2 poster calf:  intact    Reflexes: (0= none; 1+ = slight; 2+ = brisk/normal; 3+= very brisk; 4+= clonus)  L3-4 Quadriceps: NT  L5-S1 Achilles: NT    Strength (MMT)  Myotomal Testing:     Right  Left  L2-3 Hip flexion: 3/5  4/5  L3-4 Knee extension: 3-/5  4/5  L5 Great toe exten: 0/5  4/5  L4 Heel walk/DF: 0/5  4/5  S1 toe walk/PF/ever: 0/5  4/5  S2 knee flex:  3-/5  4/5      Hip abduction  3-/5  4-/5  Hip adduction  3-/5  4/5  Hip extension  3-/5  4-/5    Core strength: Upper abs: 3-/5; lower abs: 3-/5    Special Tests:  SLR supine: (R) positive (L) negative   Crossed SLR: (R) negative (L) negative   Prone instability test: NT  Prone hip IR ROM: NT  Aberrant motion/Sonny's: NT  Supine to sit test: NT  Slump test: (R) positive (L) negative   Femoral NTT: NT    Mechanical Asessment: pre-test symtpoms include NA due to possible   Repeated Extension in Standing (ANTONY): NA  Repeated Extension in Lying (REIL):  NA    Joint mobility & Palpation: decreased PA glides in thoracic and lumbar spine     Flexibility: decreased HS, piriformis, hip flexor flexibility (R worse than L)    Function:  Standing benjy limited  Sitting benjy limited  Sleep heath Yes    Gait assessment: Pt ambs with R foot drop using SPC with increased hip flexion in swing phase in gait due to foot drop.     Stair negotiation: NT         Daily Treatment Diary     Precautions: dx of nerve sheath tumor    FOTO Completed On: NA    POC Expires Reeval for Medicare to be completed  Unit Limit Auth Expiration Date PT/OT/STVisit Limit   10/8/2025 By visit 10 6 12/31/2025 NA    Completed on visit: NA                   Auth Status DATE 7/16        NA Visit # 1         Remaining 9        MANUAL THERAPY                                                                THERAPEUTIC EXERCISE HEP         Sidelying H' ABD           Sidelying H' ADD                    2 way piriformis stretch                    Supine HS pumps                    Prone Femoral N gliding           Prone quad stretch                    SAQ          SLR (flex)                     LAQ c towel roll                     Prone H' Ext                                        NEUROMUSCULAR REEDUCATION           TAC  with mod march (R up, R down, L up, L down) 1x10 1x10        TAC walk outs          TAC c Ball Squeezes Ball: 5 sec x 10 Ball 5 sec x 5        Ball with Bridge                    TAC c TB Hooklying Clamshells          TB Bridges  No TB 5 sec x 10 No TB: 5 sec x 10                  TAC c OH Feet          TAC c heel slides                     TAC Dying Bug                                                   THERAPEUTIC ACTIVITY          Chair Squats                                        GAIT TRAINING          Laps                                        MODALITIES                               Skin checks performed pre and post application: intact

## 2025-07-21 ENCOUNTER — OFFICE VISIT (OUTPATIENT)
Dept: PHYSICAL THERAPY | Facility: CLINIC | Age: 65
End: 2025-07-21
Payer: MEDICARE

## 2025-07-21 DIAGNOSIS — M62.81 MUSCLE WEAKNESS (GENERALIZED): ICD-10-CM

## 2025-07-21 DIAGNOSIS — D49.2 NERVE SHEATH TUMOR: ICD-10-CM

## 2025-07-21 DIAGNOSIS — M79.604 RIGHT LEG PAIN: ICD-10-CM

## 2025-07-21 DIAGNOSIS — M54.16 LUMBAR RADICULOPATHY: Primary | ICD-10-CM

## 2025-07-21 PROCEDURE — 97112 NEUROMUSCULAR REEDUCATION: CPT | Performed by: PHYSICAL THERAPIST

## 2025-07-21 PROCEDURE — 97530 THERAPEUTIC ACTIVITIES: CPT | Performed by: PHYSICAL THERAPIST

## 2025-07-21 PROCEDURE — 97110 THERAPEUTIC EXERCISES: CPT | Performed by: PHYSICAL THERAPIST

## 2025-07-21 NOTE — PROGRESS NOTES
Daily Note     Today's date: 2025  Patient name: Reba Mirza  : 1960  MRN: [id number]  Referring provider: Anabel Carlton MD  Dx:   Encounter Diagnosis     ICD-10-CM    1. Lumbar radiculopathy  M54.16       2. Muscle weakness (generalized)  M62.81       3. 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 She reports that her hamstring is really tight.       Objective: See treatment diary below      Assessment: Tolerated treatment well; initiated POC with adjusting DF brace on RLE and ambulating using RW in clinic f/b ambulating using personal SPC. Overall, she demonstrates improved quality of gait with DF brace on RLE. She was also encouraged to use RW with ambulating especially farther distances. She reports feeling more steady on her feet with the brace. She does report having fatigue post session.   Patient demonstrated fatigue post treatment and would benefit from continued PT      Plan: Continue per plan of care.      Posture: Lumbar lordosis is decreased in standing.        Lumbar AROM limitations:  (*=  Pain)  Lumbar flexion: NT  Lumbar extension: NT  R side glide:  NT  L side glide:  NT    Dermatomal Testing:  L2 anterior thigh: intact  L3 medial knee: intact  L4 medial maleolus: Absent   L5 1st web space: Absent  S1 lateral/sole foot: Absent  S2 poster calf:  intact    Reflexes: (0= none; 1+ = slight; 2+ = brisk/normal; 3+= very brisk; 4+= clonus)  L3-4 Quadriceps: NT  L5-S1 Achilles: NT    Strength (MMT)  Myotomal Testing:     Right  Left  L2-3 Hip flexion: 3/5  4/5  L3-4 Knee extension: 3-/5  4/5  L5 Great toe exten: 0/5  4/5  L4 Heel walk/DF: 0/5  4/5  S1 toe walk/PF/ever: 0/5  4/5  S2 knee flex:  3-/5  4/5      Hip abduction  3-/5  4-/5  Hip adduction  3-/5  4/5  Hip extension  3-/5  4-/5    Core strength: Upper abs: 3-/5; lower abs: 3-/5    Special Tests:  SLR supine: (R) positive (L) negative    Crossed SLR: (R) negative (L) negative   Prone instability test: NT  Prone hip IR ROM: NT  Aberrant motion/Onaka's: NT  Supine to sit test: NT  Slump test: (R) positive (L) negative   Femoral NTT: NT    Mechanical Asessment: pre-test symtpoms include NA due to possible   Repeated Extension in Standing (ANTONY): NA  Repeated Extension in Lying (REIL):  NA    Joint mobility & Palpation: decreased PA glides in thoracic and lumbar spine     Flexibility: decreased HS, piriformis, hip flexor flexibility (R worse than L)    Function:  Standing benjy limited  Sitting benjy limited  Sleep heath Yes    Gait assessment: Pt ambs with R foot drop using SPC with increased hip flexion in swing phase in gait due to foot drop.     Stair negotiation: NT         Daily Treatment Diary     Precautions: dx of nerve sheath tumor; exercises on high-low table for safety    FOTO Completed On: NA    POC Expires Reeval for Medicare to be completed  Unit Limit Auth Expiration Date PT/OT/STVisit Limit   10/8/2025 By visit 10 6 12/31/2025 NA    Completed on visit: NA                   Auth Status DATE 7/16 7/21       NA Visit # 1 2        Remaining 9 8       MANUAL THERAPY                                                               THERAPEUTIC EXERCISE HEP         Sidelying H' ABD           Sidelying H' ADD                    2 way piriformis stretch                    Supine HS pumps                    Prone Femoral N gliding           Prone quad stretch                    SAQ   0# 2x10 ea       SLR (flex)                     LAQ c towel roll                     Prone H' Ext                    Seated HS stretch                    NEUROMUSCULAR REEDUCATION           TAC   10 sec x 10       TAC  with mod march (R up, R down, L up, L down) 1x10 1x10 1x10         TAC walk outs          TAC c Ball Squeezes Ball: 5 sec x 10 Ball 5 sec x 5 Grey: 5 sec x 10       Ball with Bridge                    TAC c TB Hooklying Clamshells          TB Bridges  No TB 5  sec x 10 No TB: 5 sec x 10 No TB: 5 sec x 10                 TAC c OH Feet          TAC c heel slides                     TAC Dying Bug                     Seated Sciatic N glide   2x10 (no DF)                           THERAPEUTIC ACTIVITY          Chair Squats                                        GAIT TRAINING          Laps   100 ft using RW without brace  50 ft x 2 using RW and R ankle brace f/b 50 ft x 2 using SPC                                     MODALITIES                               Skin checks performed pre and post application: intact

## 2025-07-23 ENCOUNTER — APPOINTMENT (OUTPATIENT)
Dept: PHYSICAL THERAPY | Facility: CLINIC | Age: 65
End: 2025-07-23
Payer: MEDICARE

## 2025-07-29 ENCOUNTER — OFFICE VISIT (OUTPATIENT)
Dept: PHYSICAL THERAPY | Facility: CLINIC | Age: 65
End: 2025-07-29
Payer: MEDICARE

## 2025-07-29 DIAGNOSIS — M54.16 LUMBAR RADICULOPATHY: Primary | ICD-10-CM

## 2025-07-29 DIAGNOSIS — M79.604 RIGHT LEG PAIN: ICD-10-CM

## 2025-07-29 DIAGNOSIS — D49.2 NERVE SHEATH TUMOR: ICD-10-CM

## 2025-07-29 DIAGNOSIS — M62.81 MUSCLE WEAKNESS (GENERALIZED): ICD-10-CM

## 2025-07-29 PROCEDURE — 97110 THERAPEUTIC EXERCISES: CPT

## 2025-07-29 PROCEDURE — 97112 NEUROMUSCULAR REEDUCATION: CPT | Performed by: PHYSICAL THERAPIST

## 2025-07-31 ENCOUNTER — OFFICE VISIT (OUTPATIENT)
Dept: PHYSICAL THERAPY | Facility: CLINIC | Age: 65
End: 2025-07-31
Payer: MEDICARE

## 2025-07-31 DIAGNOSIS — M79.604 RIGHT LEG PAIN: ICD-10-CM

## 2025-07-31 DIAGNOSIS — M54.16 LUMBAR RADICULOPATHY: Primary | ICD-10-CM

## 2025-07-31 DIAGNOSIS — D49.2 NERVE SHEATH TUMOR: ICD-10-CM

## 2025-07-31 DIAGNOSIS — M62.81 MUSCLE WEAKNESS (GENERALIZED): ICD-10-CM

## 2025-07-31 PROCEDURE — 97112 NEUROMUSCULAR REEDUCATION: CPT | Performed by: PHYSICAL THERAPIST

## 2025-07-31 PROCEDURE — 97110 THERAPEUTIC EXERCISES: CPT | Performed by: PHYSICAL THERAPIST

## 2025-08-07 ENCOUNTER — OFFICE VISIT (OUTPATIENT)
Dept: PHYSICAL THERAPY | Facility: CLINIC | Age: 65
End: 2025-08-07
Payer: MEDICARE

## 2025-08-07 DIAGNOSIS — D49.2 NERVE SHEATH TUMOR: ICD-10-CM

## 2025-08-07 DIAGNOSIS — M62.81 MUSCLE WEAKNESS (GENERALIZED): ICD-10-CM

## 2025-08-07 DIAGNOSIS — M54.16 LUMBAR RADICULOPATHY: Primary | ICD-10-CM

## 2025-08-07 DIAGNOSIS — M79.604 RIGHT LEG PAIN: ICD-10-CM

## 2025-08-07 PROCEDURE — 97112 NEUROMUSCULAR REEDUCATION: CPT | Performed by: PHYSICAL THERAPIST

## 2025-08-07 PROCEDURE — 97110 THERAPEUTIC EXERCISES: CPT | Performed by: PHYSICAL THERAPIST

## 2025-08-11 ENCOUNTER — OFFICE VISIT (OUTPATIENT)
Dept: PHYSICAL THERAPY | Facility: CLINIC | Age: 65
End: 2025-08-11
Payer: MEDICARE

## 2025-08-14 ENCOUNTER — OFFICE VISIT (OUTPATIENT)
Dept: PHYSICAL THERAPY | Facility: CLINIC | Age: 65
End: 2025-08-14
Payer: MEDICARE

## 2025-08-21 ENCOUNTER — OFFICE VISIT (OUTPATIENT)
Dept: PHYSICAL THERAPY | Facility: CLINIC | Age: 65
End: 2025-08-21
Payer: MEDICARE

## 2025-08-21 DIAGNOSIS — M54.16 LUMBAR RADICULOPATHY: Primary | ICD-10-CM

## 2025-08-21 DIAGNOSIS — D49.2 NERVE SHEATH TUMOR: ICD-10-CM

## 2025-08-21 DIAGNOSIS — M79.604 RIGHT LEG PAIN: ICD-10-CM

## 2025-08-21 DIAGNOSIS — M62.81 MUSCLE WEAKNESS (GENERALIZED): ICD-10-CM

## 2025-08-21 PROCEDURE — 97112 NEUROMUSCULAR REEDUCATION: CPT

## 2025-08-21 PROCEDURE — 97110 THERAPEUTIC EXERCISES: CPT
